# Patient Record
Sex: FEMALE | Race: WHITE | Employment: OTHER | URBAN - METROPOLITAN AREA
[De-identification: names, ages, dates, MRNs, and addresses within clinical notes are randomized per-mention and may not be internally consistent; named-entity substitution may affect disease eponyms.]

---

## 2017-01-18 ENCOUNTER — OFFICE VISIT (OUTPATIENT)
Dept: PRIMARY CARE CLINIC | Age: 82
End: 2017-01-18

## 2017-01-18 VITALS
HEART RATE: 67 BPM | DIASTOLIC BLOOD PRESSURE: 60 MMHG | BODY MASS INDEX: 19.81 KG/M2 | HEIGHT: 64 IN | SYSTOLIC BLOOD PRESSURE: 120 MMHG | WEIGHT: 116 LBS | OXYGEN SATURATION: 97 %

## 2017-01-18 DIAGNOSIS — E03.9 ACQUIRED HYPOTHYROIDISM: ICD-10-CM

## 2017-01-18 DIAGNOSIS — M54.50 ACUTE MIDLINE LOW BACK PAIN WITHOUT SCIATICA: Primary | ICD-10-CM

## 2017-01-18 DIAGNOSIS — D64.9 ANEMIA, UNSPECIFIED TYPE: ICD-10-CM

## 2017-01-18 LAB
BASOPHILS ABSOLUTE: 0.1 K/UL (ref 0–0.2)
BASOPHILS RELATIVE PERCENT: 2.3 %
BILIRUBIN URINE: NEGATIVE
BLOOD, URINE: NEGATIVE
CLARITY: CLEAR
COLOR: YELLOW
EOSINOPHILS ABSOLUTE: 0.1 K/UL (ref 0–0.6)
EOSINOPHILS RELATIVE PERCENT: 1.2 %
GLUCOSE URINE: NEGATIVE MG/DL
HCT VFR BLD CALC: 32 % (ref 36–48)
HEMOGLOBIN: 9.6 G/DL (ref 12–16)
KETONES, URINE: NEGATIVE MG/DL
LEUKOCYTE ESTERASE, URINE: NEGATIVE
LYMPHOCYTES ABSOLUTE: 1.1 K/UL (ref 1–5.1)
LYMPHOCYTES RELATIVE PERCENT: 24.3 %
MCH RBC QN AUTO: 21.7 PG (ref 26–34)
MCHC RBC AUTO-ENTMCNC: 30.2 G/DL (ref 31–36)
MCV RBC AUTO: 71.8 FL (ref 80–100)
MICROSCOPIC EXAMINATION: NORMAL
MONOCYTES ABSOLUTE: 0.5 K/UL (ref 0–1.3)
MONOCYTES RELATIVE PERCENT: 10.1 %
NEUTROPHILS ABSOLUTE: 2.9 K/UL (ref 1.7–7.7)
NEUTROPHILS RELATIVE PERCENT: 62.1 %
NITRITE, URINE: NEGATIVE
PDW BLD-RTO: 17.5 % (ref 12.4–15.4)
PH UA: 6.5
PLATELET # BLD: 223 K/UL (ref 135–450)
PMV BLD AUTO: 8.5 FL (ref 5–10.5)
PROTEIN UA: NEGATIVE MG/DL
RBC # BLD: 4.45 M/UL (ref 4–5.2)
SPECIFIC GRAVITY UA: 1
TSH SERPL DL<=0.05 MIU/L-ACNC: 0.49 UIU/ML (ref 0.27–4.2)
URINE TYPE: NORMAL
UROBILINOGEN, URINE: 0.2 E.U./DL
WBC # BLD: 4.6 K/UL (ref 4–11)

## 2017-01-18 PROCEDURE — 99214 OFFICE O/P EST MOD 30 MIN: CPT | Performed by: INTERNAL MEDICINE

## 2017-01-18 RX ORDER — ACETAMINOPHEN 500 MG
1000 TABLET ORAL EVERY 6 HOURS PRN
Qty: 120 TABLET | Refills: 3 | Status: SHIPPED | OUTPATIENT
Start: 2017-01-18 | End: 2019-01-01 | Stop reason: SDUPTHER

## 2017-01-18 RX ORDER — LIDOCAINE 50 MG/G
1 PATCH TOPICAL DAILY
Qty: 30 PATCH | Refills: 3 | Status: SHIPPED | OUTPATIENT
Start: 2017-01-18 | End: 2017-04-18

## 2017-01-18 ASSESSMENT — PATIENT HEALTH QUESTIONNAIRE - PHQ9
SUM OF ALL RESPONSES TO PHQ9 QUESTIONS 1 & 2: 0
2. FEELING DOWN, DEPRESSED OR HOPELESS: 0
1. LITTLE INTEREST OR PLEASURE IN DOING THINGS: 0
SUM OF ALL RESPONSES TO PHQ QUESTIONS 1-9: 0

## 2017-01-18 ASSESSMENT — ENCOUNTER SYMPTOMS
ABDOMINAL PAIN: 0
GASTROINTESTINAL NEGATIVE: 1
COUGH: 0
CHEST TIGHTNESS: 0
ABDOMINAL DISTENTION: 0
BACK PAIN: 1
EYES NEGATIVE: 1
SHORTNESS OF BREATH: 0
WHEEZING: 0

## 2017-01-27 ENCOUNTER — TELEPHONE (OUTPATIENT)
Dept: DERMATOLOGY | Age: 82
End: 2017-01-27

## 2017-02-21 ENCOUNTER — TELEPHONE (OUTPATIENT)
Dept: PRIMARY CARE CLINIC | Age: 82
End: 2017-02-21

## 2017-02-21 DIAGNOSIS — M81.0 OSTEOPOROSIS: ICD-10-CM

## 2017-02-21 RX ORDER — ALENDRONATE SODIUM 70 MG/1
70 TABLET ORAL
Qty: 4 TABLET | Refills: 12 | Status: SHIPPED | OUTPATIENT
Start: 2017-02-21 | End: 2017-04-18 | Stop reason: SDUPTHER

## 2017-02-21 RX ORDER — LEVOTHYROXINE SODIUM 112 UG/1
112 TABLET ORAL DAILY
Qty: 90 TABLET | Refills: 1 | Status: SHIPPED | OUTPATIENT
Start: 2017-02-21 | End: 2017-02-22 | Stop reason: SDUPTHER

## 2017-02-21 RX ORDER — PANTOPRAZOLE SODIUM 40 MG/1
40 TABLET, DELAYED RELEASE ORAL DAILY
Qty: 30 TABLET | Refills: 4 | Status: SHIPPED | OUTPATIENT
Start: 2017-02-21 | End: 2017-06-29 | Stop reason: SDUPTHER

## 2017-02-21 RX ORDER — WARFARIN SODIUM 2.5 MG/1
2.5 TABLET ORAL DAILY
Qty: 90 TABLET | Refills: 3 | Status: SHIPPED | OUTPATIENT
Start: 2017-02-21 | End: 2017-02-22 | Stop reason: SDUPTHER

## 2017-02-22 ENCOUNTER — HOSPITAL ENCOUNTER (OUTPATIENT)
Dept: MAMMOGRAPHY | Age: 82
Discharge: HOME | End: 2017-02-22
Attending: INTERNAL MEDICINE | Admitting: INTERNAL MEDICINE

## 2017-02-22 DIAGNOSIS — Z12.31 VISIT FOR SCREENING MAMMOGRAM: ICD-10-CM

## 2017-02-22 RX ORDER — LEVOTHYROXINE SODIUM 112 UG/1
112 TABLET ORAL DAILY
Qty: 90 TABLET | Refills: 1 | Status: SHIPPED | OUTPATIENT
Start: 2017-02-22 | End: 2017-06-29 | Stop reason: SDUPTHER

## 2017-02-22 RX ORDER — WARFARIN SODIUM 2.5 MG/1
2.5 TABLET ORAL DAILY
Qty: 90 TABLET | Refills: 3 | Status: SHIPPED | OUTPATIENT
Start: 2017-02-22 | End: 2017-10-24 | Stop reason: SDUPTHER

## 2017-03-08 ENCOUNTER — OFFICE VISIT (OUTPATIENT)
Dept: PRIMARY CARE CLINIC | Age: 82
End: 2017-03-08

## 2017-03-08 VITALS
DIASTOLIC BLOOD PRESSURE: 62 MMHG | BODY MASS INDEX: 19.71 KG/M2 | WEIGHT: 114.8 LBS | RESPIRATION RATE: 14 BRPM | HEART RATE: 60 BPM | OXYGEN SATURATION: 97 % | TEMPERATURE: 96.6 F | SYSTOLIC BLOOD PRESSURE: 108 MMHG

## 2017-03-08 DIAGNOSIS — R30.0 DYSURIA: Primary | ICD-10-CM

## 2017-03-08 DIAGNOSIS — G25.2 RESTING TREMOR: ICD-10-CM

## 2017-03-08 DIAGNOSIS — N30.01 ACUTE CYSTITIS WITH HEMATURIA: ICD-10-CM

## 2017-03-08 LAB
BILIRUBIN, POC: NORMAL
BLOOD URINE, POC: NORMAL
CLARITY, POC: NORMAL
COLOR, POC: YELLOW
GLUCOSE URINE, POC: NORMAL
KETONES, POC: 5
LEUKOCYTE EST, POC: NORMAL
NITRITE, POC: NORMAL
PH, POC: 5
PROTEIN, POC: NORMAL
SPECIFIC GRAVITY, POC: 1.01
UROBILINOGEN, POC: 0.2

## 2017-03-08 PROCEDURE — 81002 URINALYSIS NONAUTO W/O SCOPE: CPT | Performed by: INTERNAL MEDICINE

## 2017-03-08 PROCEDURE — 99213 OFFICE O/P EST LOW 20 MIN: CPT | Performed by: INTERNAL MEDICINE

## 2017-03-08 RX ORDER — SULFAMETHOXAZOLE AND TRIMETHOPRIM 800; 160 MG/1; MG/1
1 TABLET ORAL 2 TIMES DAILY
Qty: 14 TABLET | Refills: 0 | Status: SHIPPED | OUTPATIENT
Start: 2017-03-08 | End: 2017-03-15

## 2017-03-08 ASSESSMENT — ENCOUNTER SYMPTOMS
SORE THROAT: 0
DIARRHEA: 0
CONSTIPATION: 0
SINUS PRESSURE: 0
COUGH: 0
ABDOMINAL PAIN: 0
RHINORRHEA: 0
VOMITING: 0
NAUSEA: 0
SHORTNESS OF BREATH: 0
ABDOMINAL DISTENTION: 0

## 2017-03-09 DIAGNOSIS — G25.2 RESTING TREMOR: Primary | ICD-10-CM

## 2017-03-10 LAB
ORGANISM: ABNORMAL
URINE CULTURE, ROUTINE: ABNORMAL
URINE CULTURE, ROUTINE: ABNORMAL

## 2017-03-22 ENCOUNTER — TELEPHONE (OUTPATIENT)
Dept: PRIMARY CARE CLINIC | Age: 82
End: 2017-03-22

## 2017-03-23 ENCOUNTER — OFFICE VISIT (OUTPATIENT)
Dept: PRIMARY CARE CLINIC | Age: 82
End: 2017-03-23

## 2017-03-23 VITALS
TEMPERATURE: 97.2 F | SYSTOLIC BLOOD PRESSURE: 120 MMHG | DIASTOLIC BLOOD PRESSURE: 70 MMHG | BODY MASS INDEX: 19.57 KG/M2 | HEART RATE: 57 BPM | OXYGEN SATURATION: 99 % | WEIGHT: 114 LBS

## 2017-03-23 DIAGNOSIS — B37.31 VULVOVAGINAL CANDIDIASIS: Primary | ICD-10-CM

## 2017-03-23 PROCEDURE — 3288F FALL RISK ASSESSMENT DOCD: CPT | Performed by: INTERNAL MEDICINE

## 2017-03-23 PROCEDURE — 99213 OFFICE O/P EST LOW 20 MIN: CPT | Performed by: INTERNAL MEDICINE

## 2017-03-23 PROCEDURE — G8510 SCR DEP NEG, NO PLAN REQD: HCPCS | Performed by: INTERNAL MEDICINE

## 2017-03-23 RX ORDER — FLUCONAZOLE 100 MG/1
100 TABLET ORAL DAILY
Qty: 2 TABLET | Refills: 0 | Status: SHIPPED | OUTPATIENT
Start: 2017-03-23 | End: 2017-04-18

## 2017-03-23 ASSESSMENT — ENCOUNTER SYMPTOMS
WHEEZING: 0
EYES NEGATIVE: 1
GASTROINTESTINAL NEGATIVE: 1
COUGH: 0
CHEST TIGHTNESS: 0
ABDOMINAL PAIN: 0
ABDOMINAL DISTENTION: 0
SHORTNESS OF BREATH: 0

## 2017-03-23 ASSESSMENT — PATIENT HEALTH QUESTIONNAIRE - PHQ9
2. FEELING DOWN, DEPRESSED OR HOPELESS: 0
SUM OF ALL RESPONSES TO PHQ9 QUESTIONS 1 & 2: 0
SUM OF ALL RESPONSES TO PHQ QUESTIONS 1-9: 0
1. LITTLE INTEREST OR PLEASURE IN DOING THINGS: 0

## 2017-04-18 ENCOUNTER — OFFICE VISIT (OUTPATIENT)
Dept: PRIMARY CARE CLINIC | Age: 82
End: 2017-04-18

## 2017-04-18 VITALS
HEART RATE: 53 BPM | SYSTOLIC BLOOD PRESSURE: 130 MMHG | WEIGHT: 115 LBS | TEMPERATURE: 97.2 F | BODY MASS INDEX: 19.74 KG/M2 | OXYGEN SATURATION: 96 % | DIASTOLIC BLOOD PRESSURE: 60 MMHG

## 2017-04-18 DIAGNOSIS — E03.9 ACQUIRED HYPOTHYROIDISM: ICD-10-CM

## 2017-04-18 DIAGNOSIS — M81.0 OSTEOPOROSIS: ICD-10-CM

## 2017-04-18 DIAGNOSIS — D64.9 ANEMIA, UNSPECIFIED TYPE: ICD-10-CM

## 2017-04-18 DIAGNOSIS — I48.20 CHRONIC ATRIAL FIBRILLATION (HCC): ICD-10-CM

## 2017-04-18 DIAGNOSIS — Z23 NEED FOR VACCINATION FOR STREP PNEUMONIAE WITH PCV 7: ICD-10-CM

## 2017-04-18 DIAGNOSIS — R53.83 FATIGUE, UNSPECIFIED TYPE: Primary | ICD-10-CM

## 2017-04-18 DIAGNOSIS — Z23 NEED FOR TDAP VACCINATION: ICD-10-CM

## 2017-04-18 DIAGNOSIS — R53.83 FATIGUE, UNSPECIFIED TYPE: ICD-10-CM

## 2017-04-18 DIAGNOSIS — E55.9 VITAMIN D DEFICIENCY: ICD-10-CM

## 2017-04-18 LAB
A/G RATIO: 1.9 (ref 1.1–2.2)
ALBUMIN SERPL-MCNC: 4.3 G/DL (ref 3.4–5)
ALP BLD-CCNC: 51 U/L (ref 40–129)
ALT SERPL-CCNC: 13 U/L (ref 10–40)
ANION GAP SERPL CALCULATED.3IONS-SCNC: 13 MMOL/L (ref 3–16)
AST SERPL-CCNC: 20 U/L (ref 15–37)
BASOPHILS ABSOLUTE: 0.1 K/UL (ref 0–0.2)
BASOPHILS RELATIVE PERCENT: 1.1 %
BILIRUB SERPL-MCNC: 0.4 MG/DL (ref 0–1)
BILIRUBIN URINE: NEGATIVE
BLOOD, URINE: NEGATIVE
BUN BLDV-MCNC: 19 MG/DL (ref 7–20)
CALCIUM SERPL-MCNC: 9.1 MG/DL (ref 8.3–10.6)
CHLORIDE BLD-SCNC: 100 MMOL/L (ref 99–110)
CHOLESTEROL, TOTAL: 181 MG/DL (ref 0–199)
CLARITY: CLEAR
CO2: 26 MMOL/L (ref 21–32)
COLOR: YELLOW
CREAT SERPL-MCNC: 0.8 MG/DL (ref 0.6–1.2)
EOSINOPHILS ABSOLUTE: 0 K/UL (ref 0–0.6)
EOSINOPHILS RELATIVE PERCENT: 0.4 %
EPITHELIAL CELLS, UA: 1 /HPF (ref 0–5)
GFR AFRICAN AMERICAN: >60
GFR NON-AFRICAN AMERICAN: >60
GLOBULIN: 2.3 G/DL
GLUCOSE BLD-MCNC: 81 MG/DL (ref 70–99)
GLUCOSE URINE: NEGATIVE MG/DL
HCT VFR BLD CALC: 31.4 % (ref 36–48)
HDLC SERPL-MCNC: 81 MG/DL (ref 40–60)
HEMOGLOBIN: 9.5 G/DL (ref 12–16)
HYALINE CASTS: 0 /LPF (ref 0–8)
KETONES, URINE: NEGATIVE MG/DL
LDL CHOLESTEROL CALCULATED: 84 MG/DL
LEUKOCYTE ESTERASE, URINE: ABNORMAL
LYMPHOCYTES ABSOLUTE: 1.2 K/UL (ref 1–5.1)
LYMPHOCYTES RELATIVE PERCENT: 24.4 %
MCH RBC QN AUTO: 21.5 PG (ref 26–34)
MCHC RBC AUTO-ENTMCNC: 30.4 G/DL (ref 31–36)
MCV RBC AUTO: 70.8 FL (ref 80–100)
MICROSCOPIC EXAMINATION: YES
MONOCYTES ABSOLUTE: 0.5 K/UL (ref 0–1.3)
MONOCYTES RELATIVE PERCENT: 10.1 %
NEUTROPHILS ABSOLUTE: 3.1 K/UL (ref 1.7–7.7)
NEUTROPHILS RELATIVE PERCENT: 64 %
NITRITE, URINE: NEGATIVE
PDW BLD-RTO: 21.6 % (ref 12.4–15.4)
PH UA: 6
PLATELET # BLD: 186 K/UL (ref 135–450)
PMV BLD AUTO: 9 FL (ref 5–10.5)
POTASSIUM SERPL-SCNC: 4.5 MMOL/L (ref 3.5–5.1)
PROTEIN UA: NEGATIVE MG/DL
RBC # BLD: 4.44 M/UL (ref 4–5.2)
RBC UA: 3 /HPF (ref 0–4)
SODIUM BLD-SCNC: 139 MMOL/L (ref 136–145)
SPECIFIC GRAVITY UA: 1.01
TOTAL PROTEIN: 6.6 G/DL (ref 6.4–8.2)
TRIGL SERPL-MCNC: 80 MG/DL (ref 0–150)
TSH SERPL DL<=0.05 MIU/L-ACNC: 0.94 UIU/ML (ref 0.27–4.2)
URINE TYPE: ABNORMAL
UROBILINOGEN, URINE: 0.2 E.U./DL
VITAMIN D 25-HYDROXY: 54.8 NG/ML
VLDLC SERPL CALC-MCNC: 16 MG/DL
WBC # BLD: 4.8 K/UL (ref 4–11)
WBC UA: 0 /HPF (ref 0–5)

## 2017-04-18 RX ORDER — ALENDRONATE SODIUM 70 MG/1
70 TABLET ORAL
Qty: 4 TABLET | Refills: 12 | Status: SHIPPED | OUTPATIENT
Start: 2017-04-18 | End: 2017-08-16

## 2017-04-18 ASSESSMENT — ENCOUNTER SYMPTOMS
COUGH: 0
CHEST TIGHTNESS: 0
ABDOMINAL DISTENTION: 0
EYES NEGATIVE: 1
BACK PAIN: 1
ABDOMINAL PAIN: 0
WHEEZING: 0
SHORTNESS OF BREATH: 0
GASTROINTESTINAL NEGATIVE: 1

## 2017-04-21 ENCOUNTER — TELEPHONE (OUTPATIENT)
Dept: PRIMARY CARE CLINIC | Age: 82
End: 2017-04-21

## 2017-04-26 ENCOUNTER — TELEPHONE (OUTPATIENT)
Dept: PRIMARY CARE CLINIC | Age: 82
End: 2017-04-26

## 2017-04-28 ENCOUNTER — OFFICE VISIT (OUTPATIENT)
Dept: PRIMARY CARE CLINIC | Age: 82
End: 2017-04-28

## 2017-04-28 VITALS
SYSTOLIC BLOOD PRESSURE: 110 MMHG | HEIGHT: 63 IN | HEART RATE: 61 BPM | DIASTOLIC BLOOD PRESSURE: 62 MMHG | BODY MASS INDEX: 20.38 KG/M2 | TEMPERATURE: 97.2 F | WEIGHT: 115 LBS | OXYGEN SATURATION: 99 %

## 2017-04-28 DIAGNOSIS — Z00.00 MEDICARE ANNUAL WELLNESS VISIT, SUBSEQUENT: Primary | ICD-10-CM

## 2017-04-28 PROCEDURE — G0439 PPPS, SUBSEQ VISIT: HCPCS | Performed by: INTERNAL MEDICINE

## 2017-04-28 ASSESSMENT — ANXIETY QUESTIONNAIRES
GAD7 TOTAL SCORE: 4
GAD7 TOTAL SCORE: 6

## 2017-04-28 ASSESSMENT — ENCOUNTER SYMPTOMS
SHORTNESS OF BREATH: 0
BACK PAIN: 1
GASTROINTESTINAL NEGATIVE: 1
WHEEZING: 0
EYES NEGATIVE: 1
ABDOMINAL DISTENTION: 0
COUGH: 0
CHEST TIGHTNESS: 0
ABDOMINAL PAIN: 0

## 2017-04-28 ASSESSMENT — LIFESTYLE VARIABLES: HOW OFTEN DO YOU HAVE A DRINK CONTAINING ALCOHOL: 0

## 2017-04-28 ASSESSMENT — PATIENT HEALTH QUESTIONNAIRE - PHQ9: SUM OF ALL RESPONSES TO PHQ QUESTIONS 1-9: 2

## 2017-05-08 ENCOUNTER — OFFICE VISIT (OUTPATIENT)
Dept: DERMATOLOGY | Age: 82
End: 2017-05-08

## 2017-05-08 DIAGNOSIS — L72.0 MILIUM: Primary | ICD-10-CM

## 2017-05-08 DIAGNOSIS — L72.0 EPIDERMAL CYST: ICD-10-CM

## 2017-05-08 DIAGNOSIS — D48.5 NEOPLASM OF UNCERTAIN BEHAVIOR OF SKIN: ICD-10-CM

## 2017-05-08 DIAGNOSIS — Z85.828 HISTORY OF BASAL CELL CANCER: ICD-10-CM

## 2017-05-08 PROCEDURE — 17110 DESTRUCTION B9 LES UP TO 14: CPT | Performed by: DERMATOLOGY

## 2017-05-08 PROCEDURE — 11100 PR BIOPSY OF SKIN LESION: CPT | Performed by: DERMATOLOGY

## 2017-05-08 PROCEDURE — 99212 OFFICE O/P EST SF 10 MIN: CPT | Performed by: DERMATOLOGY

## 2017-05-16 ENCOUNTER — TELEPHONE (OUTPATIENT)
Dept: DERMATOLOGY | Age: 82
End: 2017-05-16

## 2017-06-29 ENCOUNTER — OFFICE VISIT (OUTPATIENT)
Dept: PRIMARY CARE CLINIC | Age: 82
End: 2017-06-29

## 2017-06-29 VITALS
TEMPERATURE: 97.7 F | HEIGHT: 63 IN | OXYGEN SATURATION: 99 % | DIASTOLIC BLOOD PRESSURE: 70 MMHG | BODY MASS INDEX: 20.2 KG/M2 | HEART RATE: 80 BPM | SYSTOLIC BLOOD PRESSURE: 120 MMHG | WEIGHT: 114 LBS | RESPIRATION RATE: 16 BRPM

## 2017-06-29 DIAGNOSIS — D50.0 IRON DEFICIENCY ANEMIA DUE TO CHRONIC BLOOD LOSS: ICD-10-CM

## 2017-06-29 DIAGNOSIS — R10.84 GENERALIZED ABDOMINAL PAIN: Primary | ICD-10-CM

## 2017-06-29 DIAGNOSIS — R10.84 GENERALIZED ABDOMINAL PAIN: ICD-10-CM

## 2017-06-29 LAB
AMYLASE: 99 U/L (ref 25–115)
BASOPHILS ABSOLUTE: 0 K/UL (ref 0–0.2)
BASOPHILS RELATIVE PERCENT: 0.7 %
BILIRUBIN URINE: NEGATIVE
BLOOD, URINE: NEGATIVE
CLARITY: CLEAR
COLOR: YELLOW
EOSINOPHILS ABSOLUTE: 0 K/UL (ref 0–0.6)
EOSINOPHILS RELATIVE PERCENT: 0.4 %
GLUCOSE URINE: NEGATIVE MG/DL
HCT VFR BLD CALC: 32 % (ref 36–48)
HEMOGLOBIN: 9.9 G/DL (ref 12–16)
KETONES, URINE: NEGATIVE MG/DL
LEUKOCYTE ESTERASE, URINE: NEGATIVE
LIPASE: 69 U/L (ref 13–60)
LYMPHOCYTES ABSOLUTE: 1 K/UL (ref 1–5.1)
LYMPHOCYTES RELATIVE PERCENT: 23.5 %
MCH RBC QN AUTO: 22.4 PG (ref 26–34)
MCHC RBC AUTO-ENTMCNC: 31 G/DL (ref 31–36)
MCV RBC AUTO: 72.1 FL (ref 80–100)
MICROSCOPIC EXAMINATION: NORMAL
MONOCYTES ABSOLUTE: 0.4 K/UL (ref 0–1.3)
MONOCYTES RELATIVE PERCENT: 10.3 %
NEUTROPHILS ABSOLUTE: 2.8 K/UL (ref 1.7–7.7)
NEUTROPHILS RELATIVE PERCENT: 65.1 %
NITRITE, URINE: NEGATIVE
PDW BLD-RTO: 20.4 % (ref 12.4–15.4)
PH UA: 6
PLATELET # BLD: 164 K/UL (ref 135–450)
PMV BLD AUTO: 8.9 FL (ref 5–10.5)
PROTEIN UA: NEGATIVE MG/DL
RBC # BLD: 4.45 M/UL (ref 4–5.2)
SPECIFIC GRAVITY UA: 1.01
URINE TYPE: NORMAL
UROBILINOGEN, URINE: 0.2 E.U./DL
WBC # BLD: 4.2 K/UL (ref 4–11)

## 2017-06-29 PROCEDURE — 99214 OFFICE O/P EST MOD 30 MIN: CPT | Performed by: INTERNAL MEDICINE

## 2017-06-29 RX ORDER — PANTOPRAZOLE SODIUM 40 MG/1
40 TABLET, DELAYED RELEASE ORAL DAILY
Qty: 90 TABLET | Refills: 1 | Status: SHIPPED | OUTPATIENT
Start: 2017-06-29 | End: 2017-10-24 | Stop reason: SDUPTHER

## 2017-06-29 RX ORDER — LEVOTHYROXINE SODIUM 112 UG/1
112 TABLET ORAL DAILY
Qty: 90 TABLET | Refills: 3 | Status: SHIPPED | OUTPATIENT
Start: 2017-06-29 | End: 2017-10-24 | Stop reason: SDUPTHER

## 2017-06-29 ASSESSMENT — ENCOUNTER SYMPTOMS
ABDOMINAL PAIN: 1
COUGH: 0
SHORTNESS OF BREATH: 0
ABDOMINAL DISTENTION: 0
BACK PAIN: 1
WHEEZING: 0
EYES NEGATIVE: 1
CHEST TIGHTNESS: 0

## 2017-08-16 ENCOUNTER — OFFICE VISIT (OUTPATIENT)
Dept: PRIMARY CARE CLINIC | Age: 82
End: 2017-08-16

## 2017-08-16 VITALS
HEIGHT: 64 IN | DIASTOLIC BLOOD PRESSURE: 66 MMHG | OXYGEN SATURATION: 97 % | TEMPERATURE: 97.5 F | BODY MASS INDEX: 19.46 KG/M2 | HEART RATE: 64 BPM | SYSTOLIC BLOOD PRESSURE: 111 MMHG | WEIGHT: 114 LBS

## 2017-08-16 DIAGNOSIS — M81.0 AGE-RELATED OSTEOPOROSIS WITHOUT CURRENT PATHOLOGICAL FRACTURE: ICD-10-CM

## 2017-08-16 DIAGNOSIS — Z13.31 POSITIVE DEPRESSION SCREENING: ICD-10-CM

## 2017-08-16 DIAGNOSIS — N30.00 ACUTE CYSTITIS WITHOUT HEMATURIA: ICD-10-CM

## 2017-08-16 DIAGNOSIS — N30.00 ACUTE CYSTITIS WITHOUT HEMATURIA: Primary | ICD-10-CM

## 2017-08-16 DIAGNOSIS — D64.9 ANEMIA, UNSPECIFIED TYPE: ICD-10-CM

## 2017-08-16 LAB
BILIRUBIN URINE: NEGATIVE
BLOOD, URINE: NEGATIVE
CLARITY: CLEAR
COLOR: YELLOW
GLUCOSE URINE: NEGATIVE MG/DL
KETONES, URINE: NEGATIVE MG/DL
LEUKOCYTE ESTERASE, URINE: NEGATIVE
MICROSCOPIC EXAMINATION: NORMAL
NITRITE, URINE: NEGATIVE
PH UA: 6
PROTEIN UA: NEGATIVE MG/DL
SPECIFIC GRAVITY UA: 1.01
URINE TYPE: NORMAL
UROBILINOGEN, URINE: 0.2 E.U./DL

## 2017-08-16 PROCEDURE — G8431 POS CLIN DEPRES SCRN F/U DOC: HCPCS | Performed by: INTERNAL MEDICINE

## 2017-08-16 PROCEDURE — 99213 OFFICE O/P EST LOW 20 MIN: CPT | Performed by: INTERNAL MEDICINE

## 2017-08-16 RX ORDER — ALENDRONATE SODIUM 70 MG/1
70 TABLET ORAL
Qty: 12 TABLET | Refills: 5 | Status: SHIPPED | OUTPATIENT
Start: 2017-08-16 | End: 2017-10-24

## 2017-08-16 RX ORDER — SULFAMETHOXAZOLE AND TRIMETHOPRIM 800; 160 MG/1; MG/1
1 TABLET ORAL 2 TIMES DAILY
Qty: 12 TABLET | Refills: 0 | Status: SHIPPED | OUTPATIENT
Start: 2017-08-16 | End: 2017-08-22

## 2017-08-16 ASSESSMENT — ENCOUNTER SYMPTOMS
SHORTNESS OF BREATH: 0
ABDOMINAL PAIN: 0
COUGH: 0
EYES NEGATIVE: 1
WHEEZING: 0
CHEST TIGHTNESS: 0
BACK PAIN: 1
ABDOMINAL DISTENTION: 0

## 2017-08-23 ENCOUNTER — TELEPHONE (OUTPATIENT)
Dept: PRIMARY CARE CLINIC | Age: 82
End: 2017-08-23

## 2017-09-27 ENCOUNTER — NURSE ONLY (OUTPATIENT)
Dept: PRIMARY CARE CLINIC | Age: 82
End: 2017-09-27

## 2017-09-27 DIAGNOSIS — Z23 NEED FOR INFLUENZA VACCINATION: Primary | ICD-10-CM

## 2017-09-27 PROCEDURE — 90662 IIV NO PRSV INCREASED AG IM: CPT | Performed by: NURSE PRACTITIONER

## 2017-09-27 PROCEDURE — G0008 ADMIN INFLUENZA VIRUS VAC: HCPCS | Performed by: NURSE PRACTITIONER

## 2017-10-24 ENCOUNTER — OFFICE VISIT (OUTPATIENT)
Dept: PRIMARY CARE CLINIC | Age: 82
End: 2017-10-24

## 2017-10-24 VITALS
HEART RATE: 66 BPM | OXYGEN SATURATION: 96 % | HEIGHT: 64 IN | BODY MASS INDEX: 19.46 KG/M2 | WEIGHT: 114 LBS | TEMPERATURE: 97.3 F | DIASTOLIC BLOOD PRESSURE: 60 MMHG | SYSTOLIC BLOOD PRESSURE: 120 MMHG

## 2017-10-24 DIAGNOSIS — I48.20 CHRONIC ATRIAL FIBRILLATION (HCC): ICD-10-CM

## 2017-10-24 DIAGNOSIS — Z79.01 LONG TERM CURRENT USE OF ANTICOAGULANT THERAPY: ICD-10-CM

## 2017-10-24 DIAGNOSIS — D50.0 IRON DEFICIENCY ANEMIA DUE TO CHRONIC BLOOD LOSS: ICD-10-CM

## 2017-10-24 DIAGNOSIS — R53.83 FATIGUE, UNSPECIFIED TYPE: Primary | ICD-10-CM

## 2017-10-24 DIAGNOSIS — R53.83 FATIGUE, UNSPECIFIED TYPE: ICD-10-CM

## 2017-10-24 DIAGNOSIS — E03.9 ACQUIRED HYPOTHYROIDISM: ICD-10-CM

## 2017-10-24 LAB
BASOPHILS ABSOLUTE: 0.1 K/UL (ref 0–0.2)
BASOPHILS RELATIVE PERCENT: 1.3 %
BILIRUBIN URINE: NEGATIVE
BLOOD, URINE: NEGATIVE
CLARITY: CLEAR
COLOR: YELLOW
EOSINOPHILS ABSOLUTE: 0 K/UL (ref 0–0.6)
EOSINOPHILS RELATIVE PERCENT: 0.4 %
GLUCOSE URINE: NEGATIVE MG/DL
HCT VFR BLD CALC: 31.8 % (ref 36–48)
HEMOGLOBIN: 9.9 G/DL (ref 12–16)
INR BLD: 2.89 (ref 0.85–1.15)
KETONES, URINE: NEGATIVE MG/DL
LEUKOCYTE ESTERASE, URINE: NEGATIVE
LYMPHOCYTES ABSOLUTE: 1.3 K/UL (ref 1–5.1)
LYMPHOCYTES RELATIVE PERCENT: 23.7 %
MCH RBC QN AUTO: 22.3 PG (ref 26–34)
MCHC RBC AUTO-ENTMCNC: 31 G/DL (ref 31–36)
MCV RBC AUTO: 72 FL (ref 80–100)
MICROSCOPIC EXAMINATION: NORMAL
MONOCYTES ABSOLUTE: 0.6 K/UL (ref 0–1.3)
MONOCYTES RELATIVE PERCENT: 10.6 %
NEUTROPHILS ABSOLUTE: 3.5 K/UL (ref 1.7–7.7)
NEUTROPHILS RELATIVE PERCENT: 64 %
NITRITE, URINE: NEGATIVE
PDW BLD-RTO: 19.2 % (ref 12.4–15.4)
PH UA: 6
PLATELET # BLD: 185 K/UL (ref 135–450)
PMV BLD AUTO: 8.6 FL (ref 5–10.5)
PROTEIN UA: NEGATIVE MG/DL
PROTHROMBIN TIME: 32.7 SEC (ref 9.6–13)
RBC # BLD: 4.41 M/UL (ref 4–5.2)
SPECIFIC GRAVITY UA: <1.005
TSH SERPL DL<=0.05 MIU/L-ACNC: 0.41 UIU/ML (ref 0.27–4.2)
URINE TYPE: NORMAL
UROBILINOGEN, URINE: 0.2 E.U./DL
WBC # BLD: 5.4 K/UL (ref 4–11)

## 2017-10-24 PROCEDURE — G8484 FLU IMMUNIZE NO ADMIN: HCPCS | Performed by: INTERNAL MEDICINE

## 2017-10-24 PROCEDURE — 99214 OFFICE O/P EST MOD 30 MIN: CPT | Performed by: INTERNAL MEDICINE

## 2017-10-24 PROCEDURE — 1123F ACP DISCUSS/DSCN MKR DOCD: CPT | Performed by: INTERNAL MEDICINE

## 2017-10-24 PROCEDURE — G8399 PT W/DXA RESULTS DOCUMENT: HCPCS | Performed by: INTERNAL MEDICINE

## 2017-10-24 PROCEDURE — 4040F PNEUMOC VAC/ADMIN/RCVD: CPT | Performed by: INTERNAL MEDICINE

## 2017-10-24 PROCEDURE — G8420 CALC BMI NORM PARAMETERS: HCPCS | Performed by: INTERNAL MEDICINE

## 2017-10-24 PROCEDURE — 1090F PRES/ABSN URINE INCON ASSESS: CPT | Performed by: INTERNAL MEDICINE

## 2017-10-24 PROCEDURE — G8427 DOCREV CUR MEDS BY ELIG CLIN: HCPCS | Performed by: INTERNAL MEDICINE

## 2017-10-24 PROCEDURE — 1036F TOBACCO NON-USER: CPT | Performed by: INTERNAL MEDICINE

## 2017-10-24 RX ORDER — LEVOTHYROXINE SODIUM 112 UG/1
112 TABLET ORAL DAILY
Qty: 90 TABLET | Refills: 3 | Status: SHIPPED | OUTPATIENT
Start: 2017-10-24 | End: 2018-06-13 | Stop reason: SDUPTHER

## 2017-10-24 RX ORDER — WARFARIN SODIUM 2.5 MG/1
2.5 TABLET ORAL DAILY
Qty: 90 TABLET | Refills: 3 | Status: SHIPPED | OUTPATIENT
Start: 2017-10-24 | End: 2018-02-20 | Stop reason: SDUPTHER

## 2017-10-24 RX ORDER — PANTOPRAZOLE SODIUM 40 MG/1
40 TABLET, DELAYED RELEASE ORAL DAILY
Qty: 90 TABLET | Refills: 3 | Status: SHIPPED | OUTPATIENT
Start: 2017-10-24 | End: 2018-02-20 | Stop reason: SDUPTHER

## 2017-10-24 ASSESSMENT — ENCOUNTER SYMPTOMS
BACK PAIN: 0
CHEST TIGHTNESS: 0
ABDOMINAL PAIN: 0
WHEEZING: 0
COUGH: 0
ABDOMINAL DISTENTION: 0
SHORTNESS OF BREATH: 0
EYES NEGATIVE: 1

## 2017-10-24 NOTE — PROGRESS NOTES
Neurological: She is alert and oriented to person, place, and time. Skin: Skin is warm. Psychiatric: Her behavior is normal. Her mood appears anxious. Vitals reviewed. Assessment:      Armaan was seen today for check-up and fatigue. Diagnoses and all orders for this visit:    Fatigue, unspecified type  -     CBC Auto Differential; Future  -     TSH without Reflex; Future  -     Urinalysis; Future    Iron deficiency anemia due to chronic blood loss  Taking daily iron   -     CBC Auto Differential; Future  -     pantoprazole (PROTONIX) 40 MG tablet; Take 1 tablet by mouth daily    Acquired hypothyroidism  -     TSH without Reflex; Future  -     levothyroxine (SYNTHROID) 112 MCG tablet; Take 1 tablet by mouth daily    Chronic atrial fibrillation (HCC) rate controlled . antcoag  -     warfarin (COUMADIN) 2.5 MG tablet; Take 1 tablet by mouth daily    Other orders  -     CALCIUM CITRATE, BARIATRIC ADVANTAGE, 500MG LOZENGE; Take 2 lozenges by mouth daily      Age-related osteoporosis without current pathological fracture  Fosamax + calccium                 Plan:      Self Management Goals    Know which medication is for what condition:   Know side effects of medications, and discuss with doctor   Discuss side effects and instructions on new medications. Barriers to medication compliance addressed. All patient questions answered. Pt voiced understanding. Know correct dose/frequency of medications  Take medications at the same time each day  Stay current on medication refills  If taking OTC's check with MD/pharmacy first about interactions    Systolic BP < or equal to 353  Diastolic BP < or equal to 85    Current Flu and Pneumonia Vax    Exercise 3-5 times per week as tolerated   Keep check of weight  Weighting machine    On a scale of 1 to 5 how confident are you in these goals? 4/5       Education materials given        .

## 2017-10-25 NOTE — TELEPHONE ENCOUNTER
Pharmacy calling for clarification on the patients medication calcium citrate bariatric carlos.  Pharmacy number [phone] please advise

## 2017-11-17 ENCOUNTER — OFFICE VISIT (OUTPATIENT)
Dept: PRIMARY CARE CLINIC | Age: 82
End: 2017-11-17

## 2017-11-17 VITALS
TEMPERATURE: 97.6 F | DIASTOLIC BLOOD PRESSURE: 70 MMHG | HEIGHT: 64 IN | OXYGEN SATURATION: 98 % | HEART RATE: 83 BPM | SYSTOLIC BLOOD PRESSURE: 130 MMHG | WEIGHT: 114 LBS | BODY MASS INDEX: 19.46 KG/M2

## 2017-11-17 DIAGNOSIS — J06.9 URI, ACUTE: Primary | ICD-10-CM

## 2017-11-17 PROCEDURE — 4040F PNEUMOC VAC/ADMIN/RCVD: CPT | Performed by: INTERNAL MEDICINE

## 2017-11-17 PROCEDURE — G8484 FLU IMMUNIZE NO ADMIN: HCPCS | Performed by: INTERNAL MEDICINE

## 2017-11-17 PROCEDURE — 1036F TOBACCO NON-USER: CPT | Performed by: INTERNAL MEDICINE

## 2017-11-17 PROCEDURE — G8399 PT W/DXA RESULTS DOCUMENT: HCPCS | Performed by: INTERNAL MEDICINE

## 2017-11-17 PROCEDURE — G8420 CALC BMI NORM PARAMETERS: HCPCS | Performed by: INTERNAL MEDICINE

## 2017-11-17 PROCEDURE — G8427 DOCREV CUR MEDS BY ELIG CLIN: HCPCS | Performed by: INTERNAL MEDICINE

## 2017-11-17 PROCEDURE — 1123F ACP DISCUSS/DSCN MKR DOCD: CPT | Performed by: INTERNAL MEDICINE

## 2017-11-17 PROCEDURE — 1090F PRES/ABSN URINE INCON ASSESS: CPT | Performed by: INTERNAL MEDICINE

## 2017-11-17 PROCEDURE — 99213 OFFICE O/P EST LOW 20 MIN: CPT | Performed by: INTERNAL MEDICINE

## 2017-11-17 RX ORDER — AMOXICILLIN 500 MG/1
500 CAPSULE ORAL 3 TIMES DAILY
Qty: 21 CAPSULE | Refills: 0 | Status: SHIPPED | OUTPATIENT
Start: 2017-11-17 | End: 2017-11-24

## 2017-11-17 ASSESSMENT — ENCOUNTER SYMPTOMS
CHEST TIGHTNESS: 0
EYES NEGATIVE: 1
WHEEZING: 0
COUGH: 1
ABDOMINAL PAIN: 0
BACK PAIN: 0
ABDOMINAL DISTENTION: 0
SHORTNESS OF BREATH: 0

## 2017-11-17 NOTE — PROGRESS NOTES
Subjective:      Patient ID: Bianca Valdes is a 80 y.o. female. 17 Patient presents with:  Cough: c/o cough for 2 days   OTC Robitussin not helping . Gets chills too             Last seen  10/24/17           10 yrs . One daughter  . Lots of relatives . Still lonely . Loves by herself                 8/18/15  Established New Doctor . Cough   Associated symptoms include chills. Pertinent negatives include no shortness of breath or wheezing. Review of Systems   Constitutional: Positive for chills and fatigue. Negative for activity change. Pneumonia vac  ; prevnar   Td ap    Flu vac  10/16  Zostavac    HENT:        Partials . Eyes: Negative. Glasses ; Last eye ex 4/15 . S/P Bilat cataract surgery   Respiratory: Positive for cough. Negative for chest tightness, shortness of breath and wheezing. Does not smoke ; No Etoh    Cardiovascular: Negative. No HTN / CAD . No FH either     H/O Atrial Fib > on Coumadin    Gastrointestinal: Negative for abdominal distention and abdominal pain. Colonoscopy >    Endocrine:        No diabetes    Genitourinary: Negative for dysuria, frequency, urgency, vaginal discharge (itching) and vaginal pain. Hysterectromy   One daughter  in accident at 25     Mammogram 9/15    Musculoskeletal: Negative for arthralgias and back pain. DEXA  9/15  ; osteoporosis    Allergic/Immunologic: Negative for food allergies. Neurological: Positive for dizziness (off and on ) and tremors. Negative for numbness. Psychiatric/Behavioral: Negative for behavioral problems and sleep disturbance. The patient is nervous/anxious. Objective:   Physical Exam   Constitutional: She is oriented to person, place, and time. HENT:   Mouth/Throat: Oropharyngeal exudate present. Eyes: Conjunctivae are normal.   Neck: Neck supple. Cardiovascular: Normal rate and normal heart sounds.   An irregular rhythm present. Pulmonary/Chest: She has no wheezes. She has no rales. Abdominal: She exhibits no distension. There is no tenderness. Musculoskeletal: Normal range of motion. She exhibits deformity. She exhibits no edema. Neurological: She is alert and oriented to person, place, and time. Skin: Skin is warm. Psychiatric: Her behavior is normal. Her mood appears anxious. Vitals reviewed. Assessment:      Armaan was seen today for cough. Diagnoses and all orders for this visit:    URI, acute  -     amoxicillin (AMOXIL) 500 MG capsule; Take 1 capsule by mouth 3 times daily for 7 days        Iron deficiency anemia due to chronic blood loss  Taking daily iron   -   Acquired hypothyroidism  Cont Synthroid 112 mcg / d   -     TSH without Reflex; Future  -     levothyroxine (SYNTHROID) 112 MCG tablet; Take 1 tablet by mouth daily    Chronic atrial fibrillation (HCC) rate controlled . antcoag  -     warfarin (COUMADIN) 2.5 MG tablet; Take 1 tablet by mouth daily    Other orders  -     CALCIUM CITRATE, BARIATRIC ADVANTAGE, 500MG LOZENGE; Take 2 lozenges by mouth daily      Age-related osteoporosis without current pathological fracture  Fosamax + calccium                 Plan:      Self Management Goals    Know which medication is for what condition:   Know side effects of medications, and discuss with doctor   Discuss side effects and instructions on new medications. Barriers to medication compliance addressed. All patient questions answered. Pt voiced understanding.   Know correct dose/frequency of medications  Take medications at the same time each day  Stay current on medication refills  If taking OTC's check with MD/pharmacy first about interactions    Systolic BP < or equal to 484  Diastolic BP < or equal to 85    Current Flu and Pneumonia Vax    Exercise 3-5 times per week as tolerated   Keep check of weight  Weighting machine    On a scale of 1 to 5 how confident are you in these goals?  4/5       Education materials given        .

## 2018-01-17 ENCOUNTER — TELEPHONE (OUTPATIENT)
Dept: FAMILY MEDICINE CLINIC | Age: 83
End: 2018-01-17

## 2018-01-22 ENCOUNTER — OFFICE VISIT (OUTPATIENT)
Dept: PRIMARY CARE CLINIC | Age: 83
End: 2018-01-22

## 2018-01-22 VITALS
OXYGEN SATURATION: 97 % | HEART RATE: 63 BPM | DIASTOLIC BLOOD PRESSURE: 60 MMHG | WEIGHT: 113 LBS | SYSTOLIC BLOOD PRESSURE: 100 MMHG | TEMPERATURE: 97.3 F | BODY MASS INDEX: 19.29 KG/M2 | HEIGHT: 64 IN

## 2018-01-22 DIAGNOSIS — R35.0 FREQUENCY OF URINATION: ICD-10-CM

## 2018-01-22 DIAGNOSIS — J06.9 URI, ACUTE: Primary | ICD-10-CM

## 2018-01-22 LAB
BILIRUBIN URINE: NEGATIVE
BLOOD, URINE: NEGATIVE
CLARITY: CLEAR
COLOR: YELLOW
GLUCOSE URINE: NEGATIVE MG/DL
INFLUENZA A ANTIBODY: NEGATIVE
INFLUENZA B ANTIBODY: NEGATIVE
KETONES, URINE: NEGATIVE MG/DL
LEUKOCYTE ESTERASE, URINE: NEGATIVE
MICROSCOPIC EXAMINATION: NORMAL
NITRITE, URINE: NEGATIVE
PH UA: 5.5
PROTEIN UA: NEGATIVE MG/DL
SPECIFIC GRAVITY UA: 1.02
URINE TYPE: NORMAL
UROBILINOGEN, URINE: 0.2 E.U./DL

## 2018-01-22 PROCEDURE — G8399 PT W/DXA RESULTS DOCUMENT: HCPCS | Performed by: INTERNAL MEDICINE

## 2018-01-22 PROCEDURE — G8484 FLU IMMUNIZE NO ADMIN: HCPCS | Performed by: INTERNAL MEDICINE

## 2018-01-22 PROCEDURE — 1036F TOBACCO NON-USER: CPT | Performed by: INTERNAL MEDICINE

## 2018-01-22 PROCEDURE — 1090F PRES/ABSN URINE INCON ASSESS: CPT | Performed by: INTERNAL MEDICINE

## 2018-01-22 PROCEDURE — G8427 DOCREV CUR MEDS BY ELIG CLIN: HCPCS | Performed by: INTERNAL MEDICINE

## 2018-01-22 PROCEDURE — 87804 INFLUENZA ASSAY W/OPTIC: CPT | Performed by: INTERNAL MEDICINE

## 2018-01-22 PROCEDURE — 1123F ACP DISCUSS/DSCN MKR DOCD: CPT | Performed by: INTERNAL MEDICINE

## 2018-01-22 PROCEDURE — 4040F PNEUMOC VAC/ADMIN/RCVD: CPT | Performed by: INTERNAL MEDICINE

## 2018-01-22 PROCEDURE — G8420 CALC BMI NORM PARAMETERS: HCPCS | Performed by: INTERNAL MEDICINE

## 2018-01-22 PROCEDURE — 99213 OFFICE O/P EST LOW 20 MIN: CPT | Performed by: INTERNAL MEDICINE

## 2018-01-22 ASSESSMENT — ENCOUNTER SYMPTOMS
COUGH: 1
ABDOMINAL PAIN: 0
EYES NEGATIVE: 1
CHEST TIGHTNESS: 0
WHEEZING: 0
ABDOMINAL DISTENTION: 0
BACK PAIN: 0
SHORTNESS OF BREATH: 0

## 2018-01-22 NOTE — PROGRESS NOTES
Subjective:      Patient ID: Paola Dumont is a 80 y.o. female. 18 Patient presents with:  Cough: c/o dry cough and sinus headache for 2 weeks. Headache  Urinary Frequency            Last seen  17 Patient presents with:  Cough: c/o cough for 2 days   OTC Robitussin not helping . Gets chills too                    10 yrs . One daughter  . Lots of relatives . Still lonely . Loves by herself                 8/18/15  Established New Doctor . Cough   Associated symptoms include headaches. Pertinent negatives include no chills, shortness of breath or wheezing. Headache    Associated symptoms include coughing (off and on ). Pertinent negatives include no abdominal pain, back pain, dizziness (off and on ) or numbness. Review of Systems   Constitutional: Positive for fatigue. Negative for activity change and chills. Pneumonia vac  ; prevnar   Td ap    Flu vac    Zostavac    HENT:        Partials . Eyes: Negative. Glasses ; Last eye ex 4/15 . S/P Bilat cataract surgery   Respiratory: Positive for cough (off and on ). Negative for chest tightness, shortness of breath and wheezing. Does not smoke ; No Etoh    Cardiovascular: Negative. No HTN / CAD . No FH either     H/O Atrial Fib > on Coumadin    Gastrointestinal: Negative for abdominal distention and abdominal pain. Colonoscopy >    Endocrine:        No diabetes    Genitourinary: Positive for frequency (off and on n). Negative for dysuria, urgency, vaginal discharge (itching) and vaginal pain. Hysterectromy   One daughter  in accident at 25     Mammogram 9/15    Musculoskeletal: Negative for arthralgias and back pain. DEXA  9/15  ; osteoporosis    Allergic/Immunologic: Negative for food allergies. Neurological: Positive for tremors and headaches. Negative for dizziness (off and on ), speech difficulty and numbness.    Psychiatric/Behavioral:

## 2018-02-20 DIAGNOSIS — I48.20 CHRONIC ATRIAL FIBRILLATION (HCC): ICD-10-CM

## 2018-02-20 DIAGNOSIS — D50.0 IRON DEFICIENCY ANEMIA DUE TO CHRONIC BLOOD LOSS: ICD-10-CM

## 2018-02-20 RX ORDER — PANTOPRAZOLE SODIUM 40 MG/1
40 TABLET, DELAYED RELEASE ORAL DAILY
Qty: 90 TABLET | Refills: 3 | Status: SHIPPED | OUTPATIENT
Start: 2018-02-20 | End: 2018-04-23

## 2018-02-20 RX ORDER — WARFARIN SODIUM 2.5 MG/1
2.5 TABLET ORAL DAILY
Qty: 90 TABLET | Refills: 3 | Status: SHIPPED | OUTPATIENT
Start: 2018-02-20 | End: 2019-01-17 | Stop reason: SDUPTHER

## 2018-02-20 NOTE — TELEPHONE ENCOUNTER
Patient requesting refills on the following medications pantoprazole 40 mg and also warfarin 2.5 mg asking that meds be sent to [facility] mail order please advise when completed

## 2018-03-28 ENCOUNTER — TELEPHONE (OUTPATIENT)
Dept: PRIMARY CARE CLINIC | Age: 83
End: 2018-03-28

## 2018-03-29 DIAGNOSIS — M25.552 PAIN OF BOTH HIP JOINTS: Primary | ICD-10-CM

## 2018-03-29 DIAGNOSIS — M25.551 PAIN OF BOTH HIP JOINTS: Primary | ICD-10-CM

## 2018-03-30 ENCOUNTER — OFFICE VISIT (OUTPATIENT)
Dept: ORTHOPEDIC SURGERY | Age: 83
End: 2018-03-30

## 2018-03-30 VITALS — BODY MASS INDEX: 18.83 KG/M2 | RESPIRATION RATE: 12 BRPM | HEIGHT: 65 IN | WEIGHT: 113 LBS

## 2018-03-30 DIAGNOSIS — M25.551 RIGHT HIP PAIN: ICD-10-CM

## 2018-03-30 DIAGNOSIS — M54.5 LOW BACK PAIN, UNSPECIFIED BACK PAIN LATERALITY, UNSPECIFIED CHRONICITY, WITH SCIATICA PRESENCE UNSPECIFIED: Primary | ICD-10-CM

## 2018-03-30 PROCEDURE — 4040F PNEUMOC VAC/ADMIN/RCVD: CPT | Performed by: PHYSICIAN ASSISTANT

## 2018-03-30 PROCEDURE — G8427 DOCREV CUR MEDS BY ELIG CLIN: HCPCS | Performed by: PHYSICIAN ASSISTANT

## 2018-03-30 PROCEDURE — G8420 CALC BMI NORM PARAMETERS: HCPCS | Performed by: PHYSICIAN ASSISTANT

## 2018-03-30 PROCEDURE — G8399 PT W/DXA RESULTS DOCUMENT: HCPCS | Performed by: PHYSICIAN ASSISTANT

## 2018-03-30 PROCEDURE — 99203 OFFICE O/P NEW LOW 30 MIN: CPT | Performed by: PHYSICIAN ASSISTANT

## 2018-03-30 PROCEDURE — 1090F PRES/ABSN URINE INCON ASSESS: CPT | Performed by: PHYSICIAN ASSISTANT

## 2018-03-30 PROCEDURE — 1036F TOBACCO NON-USER: CPT | Performed by: PHYSICIAN ASSISTANT

## 2018-03-30 PROCEDURE — 1123F ACP DISCUSS/DSCN MKR DOCD: CPT | Performed by: PHYSICIAN ASSISTANT

## 2018-03-30 PROCEDURE — G8482 FLU IMMUNIZE ORDER/ADMIN: HCPCS | Performed by: PHYSICIAN ASSISTANT

## 2018-03-30 RX ORDER — METHYLPREDNISOLONE 4 MG/1
TABLET ORAL
Qty: 1 KIT | Refills: 0 | Status: SHIPPED | OUTPATIENT
Start: 2018-03-30 | End: 2018-04-05

## 2018-03-30 RX ORDER — TRAMADOL HYDROCHLORIDE 50 MG/1
50 TABLET ORAL DAILY
COMMUNITY
Start: 2018-03-04 | End: 2018-04-23

## 2018-03-30 RX ORDER — METHYLPREDNISOLONE 4 MG/1
TABLET ORAL
Qty: 1 KIT | Refills: 0 | Status: SHIPPED | OUTPATIENT
Start: 2018-03-30 | End: 2018-03-30

## 2018-03-30 NOTE — PATIENT INSTRUCTIONS
appointments, and call your doctor if you are having problems. It's also a good idea to know your test results and keep a list of the medicines you take. Where can you learn more? Go to [email or web address] and sign in to your [employer] account. Enter A062 in the [employer] box to learn more about \"Low Back Arthritis: Exercises. \"     If you do not have an account, please click on the \"Sign Up Now\" link. Current as of: March 21, 2017  Content Version: 11.5  © 7782-2548 Healthwise, Incorporated. Care instructions adapted under license by [facility] ([facility]). If you have questions about a medical condition or this instruction, always ask your healthcare professional. [address] any warranty or liability for your use of this information.

## 2018-03-30 NOTE — LETTER
[address] [place] [address] After Hours  [address] [address]  Phone: [phone]  Fax: [phone]    Maris Supriya, [place]        April 2, 2018     Pablo Martel, [facility]    Patient: Liyah Sierra  MR Number: [id number]  YOB: 1932  Date of Visit: 3/30/2018    Dear Dr. Pablo Martel:    Thank you for the request for consultation for Armaan Barker to me for the evaluation of   Encounter Diagnoses   Name Primary?  Low back pain, unspecified back pain laterality, unspecified chronicity, with sciatica presence unspecified Yes    Right hip pain      . Below are the relevant portions of my assessment and plan of care. This dictation was done with Dragon dictation and may contain mechanical errors related to translation. The review of systems was currently provided by the patient and reviewed with the medical assistant at today's visit. Please see media. Subjective:  Liyah Sierra is a 80 y.o. who is here complaining of pain in her right lower back radiating down to her right toe she doesn't remember a particular injury it's been going on for at least 2-3 weeks to the severity.  She hasn't had any particular treatment in the past the pain is becoming very bothersome to her daily activities she is here for evaluation I sent her for x-rays of her lower back      Patient Active Problem List   Diagnosis    Anxiety state    Depression    Goiter    Hypercholesterolemia    Atrial fibrillation    Essential hypertension    Osteoporosis    Allergic rhinitis, seasonal    Long term current use of anticoagulant therapy    Chest pain    Abdominal pain    Bradycardia    Fatigue    Basal cell carcinoma of mandible    Acute cystitis with hematuria    Resting tremor           Current Outpatient Prescriptions on File Prior to Visit   Medication Sig Dispense Refill

## 2018-04-02 NOTE — COMMUNICATION BODY
09/23/1983, not currently breastfeeding. On examination this is a pleasant 80-year-old female in no acute distress she is alert and oriented ×3 she can walk without antalgia she has good symmetric motion through each hip but she has a positive straight leg raise on the right that radiates down to the great toe she has a negative straight leg raise on the left she is good hip flexion and hip abduction strength. There is no cutaneous lesions or lymphadenopathy she's nontender of the greater trochanteric area and she is neurologically intact to her right foot with good dorsiflexion and plantarflexion strength  Neuro exam grossly intact both lower extremities. Intact sensation to light touch. Motor exam 4+ to 5/5 in all major motor groups. Negative Anguiano's sign. Skin is warm, dry and intact with out erythema or significant increased temperature around the knee joint(s). There are no cutaneous lesions or lymphadenopathy present. X-RAYS:  X-rays taken at the office today including AP lateral lumbar sacral view show very minimal degenerative changes through the lumbosacral spine      Assessment:  L5 radiculopathy to the right    Plan:  During today's visit, there was approximately 30 minutes of face-to-face discussion in regards to the patient's current condition and treatment options. More than 50 % of the time was counseling and coordination of care.       We talked about options and treatment she wanted a cortisone injection however at this point I feel a Medrol Dosepak and the specific stretch and strengthening exercise program is in her best interest      PROCEDURE NOTE:   I went through a specific stretch and strengthening exercise routine and she'll follow up with us in 4-5 weeks after finishing the Medrol Dosepak      They will schedule a follow up in a month

## 2018-04-23 ENCOUNTER — OFFICE VISIT (OUTPATIENT)
Dept: PRIMARY CARE CLINIC | Age: 83
End: 2018-04-23

## 2018-04-23 VITALS
OXYGEN SATURATION: 100 % | RESPIRATION RATE: 12 BRPM | TEMPERATURE: 97.1 F | DIASTOLIC BLOOD PRESSURE: 56 MMHG | SYSTOLIC BLOOD PRESSURE: 132 MMHG | HEART RATE: 57 BPM | WEIGHT: 111 LBS | HEIGHT: 65 IN | BODY MASS INDEX: 18.49 KG/M2

## 2018-04-23 DIAGNOSIS — N30.00 ACUTE CYSTITIS WITHOUT HEMATURIA: ICD-10-CM

## 2018-04-23 DIAGNOSIS — N30.00 ACUTE CYSTITIS WITHOUT HEMATURIA: Primary | ICD-10-CM

## 2018-04-23 DIAGNOSIS — R25.1 TREMOR OF RIGHT HAND: ICD-10-CM

## 2018-04-23 LAB
BILIRUBIN, POC: NORMAL
BLOOD URINE, POC: NORMAL
CLARITY, POC: CLEAR
COLOR, POC: YELLOW
GLUCOSE URINE, POC: NORMAL
KETONES, POC: NORMAL
LEUKOCYTE EST, POC: NORMAL
NITRITE, POC: NORMAL
PH, POC: 5.5
PROTEIN, POC: NORMAL
SPECIFIC GRAVITY, POC: 1.01
UROBILINOGEN, POC: 0.2

## 2018-04-23 PROCEDURE — 1090F PRES/ABSN URINE INCON ASSESS: CPT | Performed by: FAMILY MEDICINE

## 2018-04-23 PROCEDURE — 81002 URINALYSIS NONAUTO W/O SCOPE: CPT | Performed by: FAMILY MEDICINE

## 2018-04-23 PROCEDURE — G8399 PT W/DXA RESULTS DOCUMENT: HCPCS | Performed by: FAMILY MEDICINE

## 2018-04-23 PROCEDURE — 99213 OFFICE O/P EST LOW 20 MIN: CPT | Performed by: FAMILY MEDICINE

## 2018-04-23 PROCEDURE — 1123F ACP DISCUSS/DSCN MKR DOCD: CPT | Performed by: FAMILY MEDICINE

## 2018-04-23 PROCEDURE — G8427 DOCREV CUR MEDS BY ELIG CLIN: HCPCS | Performed by: FAMILY MEDICINE

## 2018-04-23 PROCEDURE — 4040F PNEUMOC VAC/ADMIN/RCVD: CPT | Performed by: FAMILY MEDICINE

## 2018-04-23 PROCEDURE — G8419 CALC BMI OUT NRM PARAM NOF/U: HCPCS | Performed by: FAMILY MEDICINE

## 2018-04-23 PROCEDURE — 1036F TOBACCO NON-USER: CPT | Performed by: FAMILY MEDICINE

## 2018-04-23 RX ORDER — SULFAMETHOXAZOLE AND TRIMETHOPRIM 800; 160 MG/1; MG/1
1 TABLET ORAL 2 TIMES DAILY
Qty: 14 TABLET | Refills: 0 | Status: SHIPPED | OUTPATIENT
Start: 2018-04-23 | End: 2018-04-30

## 2018-04-23 RX ORDER — MUSCLE RUB CREAM 100; 150 MG/G; MG/G
CREAM TOPICAL
COMMUNITY
Start: 2018-02-20 | End: 2019-01-01

## 2018-04-25 LAB — URINE CULTURE, ROUTINE: NORMAL

## 2018-05-14 ENCOUNTER — OFFICE VISIT (OUTPATIENT)
Dept: PRIMARY CARE CLINIC | Age: 83
End: 2018-05-14

## 2018-05-14 VITALS
SYSTOLIC BLOOD PRESSURE: 100 MMHG | BODY MASS INDEX: 18.49 KG/M2 | RESPIRATION RATE: 18 BRPM | DIASTOLIC BLOOD PRESSURE: 58 MMHG | HEART RATE: 73 BPM | TEMPERATURE: 97 F | HEIGHT: 65 IN | WEIGHT: 111 LBS | OXYGEN SATURATION: 100 %

## 2018-05-14 DIAGNOSIS — B37.31 VAGINA, CANDIDIASIS: Primary | ICD-10-CM

## 2018-05-14 DIAGNOSIS — R31.9 HEMATURIA, UNSPECIFIED TYPE: ICD-10-CM

## 2018-05-14 LAB
INR BLD: 4.41 (ref 0.85–1.15)
PROTHROMBIN TIME: 49.8 SEC (ref 9.6–13)

## 2018-05-14 PROCEDURE — G8419 CALC BMI OUT NRM PARAM NOF/U: HCPCS | Performed by: FAMILY MEDICINE

## 2018-05-14 PROCEDURE — 1123F ACP DISCUSS/DSCN MKR DOCD: CPT | Performed by: FAMILY MEDICINE

## 2018-05-14 PROCEDURE — G8399 PT W/DXA RESULTS DOCUMENT: HCPCS | Performed by: FAMILY MEDICINE

## 2018-05-14 PROCEDURE — 1090F PRES/ABSN URINE INCON ASSESS: CPT | Performed by: FAMILY MEDICINE

## 2018-05-14 PROCEDURE — G8427 DOCREV CUR MEDS BY ELIG CLIN: HCPCS | Performed by: FAMILY MEDICINE

## 2018-05-14 PROCEDURE — 4040F PNEUMOC VAC/ADMIN/RCVD: CPT | Performed by: FAMILY MEDICINE

## 2018-05-14 PROCEDURE — 1036F TOBACCO NON-USER: CPT | Performed by: FAMILY MEDICINE

## 2018-05-14 PROCEDURE — 99213 OFFICE O/P EST LOW 20 MIN: CPT | Performed by: FAMILY MEDICINE

## 2018-05-14 RX ORDER — NYSTATIN 100000 U/G
CREAM TOPICAL
Qty: 1 TUBE | Refills: 0 | Status: SHIPPED | OUTPATIENT
Start: 2018-05-14 | End: 2019-01-01

## 2018-05-14 ASSESSMENT — PATIENT HEALTH QUESTIONNAIRE - PHQ9
SUM OF ALL RESPONSES TO PHQ QUESTIONS 1-9: 0
2. FEELING DOWN, DEPRESSED OR HOPELESS: 0
1. LITTLE INTEREST OR PLEASURE IN DOING THINGS: 0
SUM OF ALL RESPONSES TO PHQ9 QUESTIONS 1 & 2: 0

## 2018-05-17 ENCOUNTER — TELEPHONE (OUTPATIENT)
Dept: PRIMARY CARE CLINIC | Age: 83
End: 2018-05-17

## 2018-05-17 NOTE — TELEPHONE ENCOUNTER
Pt returned our call for lab results. I gave her Dr. Amada Ewing msg:    Notes recorded by Carola Mccarthy MD on 5/15/2018 at 3:07 PM EDT  Needs to reduce dose of Coumadin.  Should take 1/2 tab daily and retest in one week.  Should follow up with Dr Karthik Richardson soon. Pt understood. She has an appt with Dr. Karthik Richardson 6/13. She wants to know if she should just come into the office in one week for blood work. Please let pt know. Order is not in EPIC.      PATIENT:  [phone]

## 2018-06-13 ENCOUNTER — OFFICE VISIT (OUTPATIENT)
Dept: PRIMARY CARE CLINIC | Age: 83
End: 2018-06-13

## 2018-06-13 VITALS
HEART RATE: 65 BPM | DIASTOLIC BLOOD PRESSURE: 70 MMHG | SYSTOLIC BLOOD PRESSURE: 130 MMHG | WEIGHT: 110 LBS | TEMPERATURE: 97.1 F | HEIGHT: 64 IN | OXYGEN SATURATION: 100 % | BODY MASS INDEX: 18.78 KG/M2

## 2018-06-13 DIAGNOSIS — R53.83 OTHER FATIGUE: Primary | ICD-10-CM

## 2018-06-13 DIAGNOSIS — I48.20 CHRONIC ATRIAL FIBRILLATION (HCC): ICD-10-CM

## 2018-06-13 DIAGNOSIS — R53.83 OTHER FATIGUE: ICD-10-CM

## 2018-06-13 DIAGNOSIS — E03.9 ACQUIRED HYPOTHYROIDISM: ICD-10-CM

## 2018-06-13 LAB
A/G RATIO: 2 (ref 1.1–2.2)
ALBUMIN SERPL-MCNC: 4.7 G/DL (ref 3.4–5)
ALP BLD-CCNC: 46 U/L (ref 40–129)
ALT SERPL-CCNC: 12 U/L (ref 10–40)
ANION GAP SERPL CALCULATED.3IONS-SCNC: 20 MMOL/L (ref 3–16)
AST SERPL-CCNC: 21 U/L (ref 15–37)
ATYPICAL LYMPHOCYTE RELATIVE PERCENT: 1 % (ref 0–6)
BASOPHILS ABSOLUTE: 0.1 K/UL (ref 0–0.2)
BASOPHILS RELATIVE PERCENT: 2 %
BILIRUB SERPL-MCNC: 0.7 MG/DL (ref 0–1)
BILIRUBIN URINE: NEGATIVE
BLOOD, URINE: NEGATIVE
BUN BLDV-MCNC: 18 MG/DL (ref 7–20)
CALCIUM SERPL-MCNC: 9.3 MG/DL (ref 8.3–10.6)
CHLORIDE BLD-SCNC: 101 MMOL/L (ref 99–110)
CLARITY: CLEAR
CO2: 23 MMOL/L (ref 21–32)
COLOR: YELLOW
CREAT SERPL-MCNC: 0.9 MG/DL (ref 0.6–1.2)
EOSINOPHILS ABSOLUTE: 0 K/UL (ref 0–0.6)
EOSINOPHILS RELATIVE PERCENT: 0 %
GFR AFRICAN AMERICAN: >60
GFR NON-AFRICAN AMERICAN: 59
GLOBULIN: 2.3 G/DL
GLUCOSE BLD-MCNC: 83 MG/DL (ref 70–99)
GLUCOSE URINE: NEGATIVE MG/DL
HCT VFR BLD CALC: 26.1 % (ref 36–48)
HEMATOLOGY PATH CONSULT: YES
HEMOGLOBIN: 7.8 G/DL (ref 12–16)
HYPOCHROMIA: ABNORMAL
INR BLD: 1.45 (ref 0.86–1.14)
KETONES, URINE: NEGATIVE MG/DL
LEUKOCYTE ESTERASE, URINE: NEGATIVE
LYMPHOCYTES ABSOLUTE: 1.3 K/UL (ref 1–5.1)
LYMPHOCYTES RELATIVE PERCENT: 31 %
MCH RBC QN AUTO: 18.3 PG (ref 26–34)
MCHC RBC AUTO-ENTMCNC: 30.1 G/DL (ref 31–36)
MCV RBC AUTO: 60.8 FL (ref 80–100)
MICROCYTES: ABNORMAL
MICROSCOPIC EXAMINATION: NORMAL
MONOCYTES ABSOLUTE: 0.2 K/UL (ref 0–1.3)
MONOCYTES RELATIVE PERCENT: 6 %
NEUTROPHILS ABSOLUTE: 2.5 K/UL (ref 1.7–7.7)
NEUTROPHILS RELATIVE PERCENT: 60 %
NITRITE, URINE: NEGATIVE
PDW BLD-RTO: 22.3 % (ref 12.4–15.4)
PH UA: 6.5
PLATELET # BLD: 190 K/UL (ref 135–450)
PLATELET SLIDE REVIEW: ADEQUATE
PMV BLD AUTO: 9.3 FL (ref 5–10.5)
POTASSIUM SERPL-SCNC: 4.5 MMOL/L (ref 3.5–5.1)
PROTEIN UA: NEGATIVE MG/DL
PROTHROMBIN TIME: 16.5 SEC (ref 9.8–13)
RBC # BLD: 4.29 M/UL (ref 4–5.2)
SLIDE REVIEW: ABNORMAL
SODIUM BLD-SCNC: 144 MMOL/L (ref 136–145)
SPECIFIC GRAVITY UA: 1
TOTAL PROTEIN: 7 G/DL (ref 6.4–8.2)
TOXIC GRANULATION: PRESENT
TSH SERPL DL<=0.05 MIU/L-ACNC: 0.18 UIU/ML (ref 0.27–4.2)
URINE TYPE: NORMAL
UROBILINOGEN, URINE: 0.2 E.U./DL
WBC # BLD: 4.1 K/UL (ref 4–11)

## 2018-06-13 PROCEDURE — 1036F TOBACCO NON-USER: CPT | Performed by: INTERNAL MEDICINE

## 2018-06-13 PROCEDURE — 99214 OFFICE O/P EST MOD 30 MIN: CPT | Performed by: INTERNAL MEDICINE

## 2018-06-13 PROCEDURE — 4040F PNEUMOC VAC/ADMIN/RCVD: CPT | Performed by: INTERNAL MEDICINE

## 2018-06-13 PROCEDURE — 1090F PRES/ABSN URINE INCON ASSESS: CPT | Performed by: INTERNAL MEDICINE

## 2018-06-13 PROCEDURE — G8420 CALC BMI NORM PARAMETERS: HCPCS | Performed by: INTERNAL MEDICINE

## 2018-06-13 PROCEDURE — 1123F ACP DISCUSS/DSCN MKR DOCD: CPT | Performed by: INTERNAL MEDICINE

## 2018-06-13 PROCEDURE — G8427 DOCREV CUR MEDS BY ELIG CLIN: HCPCS | Performed by: INTERNAL MEDICINE

## 2018-06-13 PROCEDURE — G8399 PT W/DXA RESULTS DOCUMENT: HCPCS | Performed by: INTERNAL MEDICINE

## 2018-06-13 RX ORDER — LEVOTHYROXINE SODIUM 112 UG/1
112 TABLET ORAL DAILY
Qty: 30 TABLET | Refills: 3 | Status: SHIPPED | OUTPATIENT
Start: 2018-06-13 | End: 2018-06-14 | Stop reason: SDUPTHER

## 2018-06-13 ASSESSMENT — ENCOUNTER SYMPTOMS
CHEST TIGHTNESS: 0
SHORTNESS OF BREATH: 0
EYES NEGATIVE: 1
WHEEZING: 0
ABDOMINAL PAIN: 0
BACK PAIN: 0
ABDOMINAL DISTENTION: 0

## 2018-06-14 DIAGNOSIS — E03.9 ACQUIRED HYPOTHYROIDISM: ICD-10-CM

## 2018-06-14 LAB — HEMATOLOGY PATH CONSULT: NORMAL

## 2018-06-14 RX ORDER — LEVOTHYROXINE SODIUM 0.1 MG/1
100 TABLET ORAL DAILY
Qty: 30 TABLET | Refills: 3 | Status: SHIPPED | OUTPATIENT
Start: 2018-06-14 | End: 2018-06-18 | Stop reason: SDUPTHER

## 2018-06-18 DIAGNOSIS — E03.9 ACQUIRED HYPOTHYROIDISM: ICD-10-CM

## 2018-06-18 RX ORDER — LEVOTHYROXINE SODIUM 0.1 MG/1
100 TABLET ORAL DAILY
Qty: 90 TABLET | Refills: 3 | Status: SHIPPED | OUTPATIENT
Start: 2018-06-18 | End: 2019-01-01 | Stop reason: SDUPTHER

## 2018-06-22 DIAGNOSIS — I48.91 ATRIAL FIBRILLATION, UNSPECIFIED TYPE (HCC): Primary | ICD-10-CM

## 2018-06-22 LAB
INR BLD: 3.27 (ref 0.86–1.14)
PROTHROMBIN TIME: 37.3 SEC (ref 9.8–13)

## 2018-06-26 ENCOUNTER — TELEPHONE (OUTPATIENT)
Dept: PRIMARY CARE CLINIC | Age: 83
End: 2018-06-26

## 2018-06-26 DIAGNOSIS — M81.0 OSTEOPOROSIS: ICD-10-CM

## 2018-06-28 ENCOUNTER — OFFICE VISIT (OUTPATIENT)
Dept: PRIMARY CARE CLINIC | Age: 83
End: 2018-06-28

## 2018-06-28 VITALS
SYSTOLIC BLOOD PRESSURE: 132 MMHG | OXYGEN SATURATION: 99 % | HEIGHT: 64 IN | TEMPERATURE: 97.2 F | WEIGHT: 109 LBS | BODY MASS INDEX: 18.61 KG/M2 | HEART RATE: 70 BPM | DIASTOLIC BLOOD PRESSURE: 71 MMHG

## 2018-06-28 DIAGNOSIS — K62.5 RECTAL BLEEDING: Primary | ICD-10-CM

## 2018-06-28 PROCEDURE — 4040F PNEUMOC VAC/ADMIN/RCVD: CPT | Performed by: INTERNAL MEDICINE

## 2018-06-28 PROCEDURE — 1123F ACP DISCUSS/DSCN MKR DOCD: CPT | Performed by: INTERNAL MEDICINE

## 2018-06-28 PROCEDURE — 1036F TOBACCO NON-USER: CPT | Performed by: INTERNAL MEDICINE

## 2018-06-28 PROCEDURE — G8399 PT W/DXA RESULTS DOCUMENT: HCPCS | Performed by: INTERNAL MEDICINE

## 2018-06-28 PROCEDURE — 99213 OFFICE O/P EST LOW 20 MIN: CPT | Performed by: INTERNAL MEDICINE

## 2018-06-28 PROCEDURE — G8427 DOCREV CUR MEDS BY ELIG CLIN: HCPCS | Performed by: INTERNAL MEDICINE

## 2018-06-28 PROCEDURE — G8420 CALC BMI NORM PARAMETERS: HCPCS | Performed by: INTERNAL MEDICINE

## 2018-06-28 PROCEDURE — 1090F PRES/ABSN URINE INCON ASSESS: CPT | Performed by: INTERNAL MEDICINE

## 2018-06-29 ASSESSMENT — ENCOUNTER SYMPTOMS
ANAL BLEEDING: 1
VOMITING: 0
NAUSEA: 0
DIARRHEA: 0
CONSTIPATION: 0
RECTAL PAIN: 1
COUGH: 0
ABDOMINAL PAIN: 0
SHORTNESS OF BREATH: 0
BACK PAIN: 0
ABDOMINAL DISTENTION: 0

## 2018-07-02 DIAGNOSIS — K62.5 RECTAL BLEEDING: ICD-10-CM

## 2018-07-02 LAB
BASOPHILS ABSOLUTE: 0.1 K/UL (ref 0–0.2)
BASOPHILS RELATIVE PERCENT: 3 %
EOSINOPHILS ABSOLUTE: 0 K/UL (ref 0–0.6)
EOSINOPHILS RELATIVE PERCENT: 0 %
HCT VFR BLD CALC: 26.3 % (ref 36–48)
HEMATOLOGY PATH CONSULT: NO
HEMOGLOBIN: 7.8 G/DL (ref 12–16)
HYPOCHROMIA: ABNORMAL
INR BLD: 2.93 (ref 0.86–1.14)
LYMPHOCYTES ABSOLUTE: 0.6 K/UL (ref 1–5.1)
LYMPHOCYTES RELATIVE PERCENT: 17 %
MCH RBC QN AUTO: 18.2 PG (ref 26–34)
MCHC RBC AUTO-ENTMCNC: 29.9 G/DL (ref 31–36)
MCV RBC AUTO: 61 FL (ref 80–100)
MICROCYTES: ABNORMAL
MONOCYTES ABSOLUTE: 0.3 K/UL (ref 0–1.3)
MONOCYTES RELATIVE PERCENT: 8 %
NEUTROPHILS ABSOLUTE: 2.6 K/UL (ref 1.7–7.7)
NEUTROPHILS RELATIVE PERCENT: 72 %
OVALOCYTES: ABNORMAL
PDW BLD-RTO: 23.3 % (ref 12.4–15.4)
PLATELET # BLD: 186 K/UL (ref 135–450)
PMV BLD AUTO: 9.1 FL (ref 5–10.5)
PROTHROMBIN TIME: 33.4 SEC (ref 9.8–13)
RBC # BLD: 4.3 M/UL (ref 4–5.2)
SCHISTOCYTES: ABNORMAL
TARGET CELLS: ABNORMAL
WBC # BLD: 3.6 K/UL (ref 4–11)

## 2018-07-29 PROBLEM — I48.91 A-FIB (HCC): Status: ACTIVE | Noted: 2018-07-29

## 2018-08-09 ENCOUNTER — OFFICE VISIT (OUTPATIENT)
Dept: PRIMARY CARE CLINIC | Age: 83
End: 2018-08-09

## 2018-08-09 VITALS
TEMPERATURE: 97.1 F | WEIGHT: 108 LBS | SYSTOLIC BLOOD PRESSURE: 100 MMHG | HEART RATE: 60 BPM | HEIGHT: 64 IN | OXYGEN SATURATION: 100 % | BODY MASS INDEX: 18.44 KG/M2 | DIASTOLIC BLOOD PRESSURE: 60 MMHG

## 2018-08-09 DIAGNOSIS — E87.6 HYPOKALEMIA: ICD-10-CM

## 2018-08-09 DIAGNOSIS — E03.9 ACQUIRED HYPOTHYROIDISM: ICD-10-CM

## 2018-08-09 DIAGNOSIS — Z09 HOSPITAL DISCHARGE FOLLOW-UP: ICD-10-CM

## 2018-08-09 DIAGNOSIS — D50.0 IRON DEFICIENCY ANEMIA DUE TO CHRONIC BLOOD LOSS: ICD-10-CM

## 2018-08-09 LAB
A/G RATIO: 2 (ref 1.1–2.2)
ALBUMIN SERPL-MCNC: 4.6 G/DL (ref 3.4–5)
ALP BLD-CCNC: 50 U/L (ref 40–129)
ALT SERPL-CCNC: 14 U/L (ref 10–40)
ANION GAP SERPL CALCULATED.3IONS-SCNC: 13 MMOL/L (ref 3–16)
ANISOCYTOSIS: ABNORMAL
AST SERPL-CCNC: 19 U/L (ref 15–37)
BASOPHILS ABSOLUTE: 0.1 K/UL (ref 0–0.2)
BASOPHILS RELATIVE PERCENT: 2 %
BILIRUB SERPL-MCNC: 0.5 MG/DL (ref 0–1)
BUN BLDV-MCNC: 17 MG/DL (ref 7–20)
CALCIUM SERPL-MCNC: 9.2 MG/DL (ref 8.3–10.6)
CHLORIDE BLD-SCNC: 103 MMOL/L (ref 99–110)
CO2: 28 MMOL/L (ref 21–32)
CREAT SERPL-MCNC: 1 MG/DL (ref 0.6–1.2)
EOSINOPHILS ABSOLUTE: 0 K/UL (ref 0–0.6)
EOSINOPHILS RELATIVE PERCENT: 0 %
FOLATE: 16.03 NG/ML (ref 4.78–24.2)
GFR AFRICAN AMERICAN: >60
GFR NON-AFRICAN AMERICAN: 53
GLOBULIN: 2.3 G/DL
GLUCOSE BLD-MCNC: 104 MG/DL (ref 70–99)
HCT VFR BLD CALC: 30 % (ref 36–48)
HEMATOLOGY PATH CONSULT: NO
HEMOGLOBIN: 8.9 G/DL (ref 12–16)
HYPOCHROMIA: ABNORMAL
LYMPHOCYTES ABSOLUTE: 1.5 K/UL (ref 1–5.1)
LYMPHOCYTES RELATIVE PERCENT: 35 %
MCH RBC QN AUTO: 19.4 PG (ref 26–34)
MCHC RBC AUTO-ENTMCNC: 29.8 G/DL (ref 31–36)
MCV RBC AUTO: 65.2 FL (ref 80–100)
MONOCYTES ABSOLUTE: 0.3 K/UL (ref 0–1.3)
MONOCYTES RELATIVE PERCENT: 6 %
NEUTROPHILS ABSOLUTE: 2.5 K/UL (ref 1.7–7.7)
NEUTROPHILS RELATIVE PERCENT: 57 %
OVALOCYTES: ABNORMAL
PDW BLD-RTO: 27.6 % (ref 12.4–15.4)
PLATELET # BLD: 174 K/UL (ref 135–450)
PMV BLD AUTO: 9.3 FL (ref 5–10.5)
POTASSIUM SERPL-SCNC: 4 MMOL/L (ref 3.5–5.1)
RBC # BLD: 4.6 M/UL (ref 4–5.2)
SODIUM BLD-SCNC: 144 MMOL/L (ref 136–145)
TEAR DROP CELLS: ABNORMAL
TOTAL PROTEIN: 6.9 G/DL (ref 6.4–8.2)
TSH SERPL DL<=0.05 MIU/L-ACNC: 0.37 UIU/ML (ref 0.27–4.2)
VITAMIN B-12: 857 PG/ML (ref 211–911)
WBC # BLD: 4.3 K/UL (ref 4–11)

## 2018-08-09 PROCEDURE — G8420 CALC BMI NORM PARAMETERS: HCPCS | Performed by: INTERNAL MEDICINE

## 2018-08-09 PROCEDURE — 4040F PNEUMOC VAC/ADMIN/RCVD: CPT | Performed by: INTERNAL MEDICINE

## 2018-08-09 PROCEDURE — 1036F TOBACCO NON-USER: CPT | Performed by: INTERNAL MEDICINE

## 2018-08-09 PROCEDURE — 1090F PRES/ABSN URINE INCON ASSESS: CPT | Performed by: INTERNAL MEDICINE

## 2018-08-09 PROCEDURE — 1123F ACP DISCUSS/DSCN MKR DOCD: CPT | Performed by: INTERNAL MEDICINE

## 2018-08-09 PROCEDURE — G8399 PT W/DXA RESULTS DOCUMENT: HCPCS | Performed by: INTERNAL MEDICINE

## 2018-08-09 PROCEDURE — G8427 DOCREV CUR MEDS BY ELIG CLIN: HCPCS | Performed by: INTERNAL MEDICINE

## 2018-08-09 PROCEDURE — 1101F PT FALLS ASSESS-DOCD LE1/YR: CPT | Performed by: INTERNAL MEDICINE

## 2018-08-09 PROCEDURE — 99214 OFFICE O/P EST MOD 30 MIN: CPT | Performed by: INTERNAL MEDICINE

## 2018-08-09 PROCEDURE — 1111F DSCHRG MED/CURRENT MED MERGE: CPT | Performed by: INTERNAL MEDICINE

## 2018-08-09 RX ORDER — PANTOPRAZOLE SODIUM 40 MG/1
40 TABLET, DELAYED RELEASE ORAL DAILY
Qty: 30 TABLET | Refills: 3 | Status: SHIPPED | OUTPATIENT
Start: 2018-08-09 | End: 2019-01-01 | Stop reason: SDUPTHER

## 2018-08-09 ASSESSMENT — ENCOUNTER SYMPTOMS
CHEST TIGHTNESS: 0
ABDOMINAL DISTENTION: 0
EYES NEGATIVE: 1
BACK PAIN: 0
ABDOMINAL PAIN: 0
SHORTNESS OF BREATH: 0
WHEEZING: 0

## 2018-08-09 NOTE — PROGRESS NOTES
tablets by mouth daily 180 tablet 5    acetaminophen (APAP EXTRA STRENGTH) 500 MG tablet Take 2 tablets by mouth every 6 hours as needed for Pain 120 tablet 3        Medications patient taking as of now reconciled against medications ordered at time of hospital discharge:     Chief Complaint   Patient presents with   [address] from Hospital       HPI    Inpatient course: Discharge summary reviewed- see chart. Review of Systems    Vitals:    08/09/18 1436 08/09/18 1458   BP: (!) 121/58 100/60   Pulse: 57 60   Temp: 97.1 °F (36.2 °C)    TempSrc: Oral    SpO2: 100%    Weight: 108 lb (49 kg)    Height: 5' 4\" (1.626 m)      Body mass index is 18.54 kg/m². Wt Readings from Last 3 Encounters:   08/09/18 108 lb (49 kg)   07/31/18 108 lb 7.5 oz (49.2 kg)   06/28/18 109 lb (49.4 kg)     BP Readings from Last 3 Encounters:   08/09/18 100/60   07/31/18 (!) 113/39   06/28/18 132/71       Physical Exam        Assessment/Plan:  1. Iron deficiency anemia due to chronic blood loss    - CBC Auto Differential; Future  - VITAMIN B12 & FOLATE; Future  - Polysaccharide Iron Complex (PROFE) 391.3 (180 Fe) MG CAPS; Take 180 mg by mouth daily  Dispense: 90 capsule; Refill: 3    2. Hypokalemia    - Comprehensive Metabolic Panel; Future    3. Acquired hypothyroidism    - TSH without Reflex; Future    4.  Hospital discharge follow-up    - [place] DISCHARGE MEDS RECONCILED W/ CURRENT OUTPATIENT MED LIST        Medical Decision Making

## 2018-09-12 ENCOUNTER — OFFICE VISIT (OUTPATIENT)
Dept: PRIMARY CARE CLINIC | Age: 83
End: 2018-09-12

## 2018-09-12 VITALS
DIASTOLIC BLOOD PRESSURE: 60 MMHG | HEIGHT: 64 IN | BODY MASS INDEX: 18.61 KG/M2 | OXYGEN SATURATION: 100 % | WEIGHT: 109 LBS | HEART RATE: 62 BPM | TEMPERATURE: 98.3 F | SYSTOLIC BLOOD PRESSURE: 120 MMHG

## 2018-09-12 DIAGNOSIS — Z23 FLU VACCINE NEED: ICD-10-CM

## 2018-09-12 DIAGNOSIS — D50.0 IRON DEFICIENCY ANEMIA DUE TO CHRONIC BLOOD LOSS: ICD-10-CM

## 2018-09-12 DIAGNOSIS — I48.20 CHRONIC ATRIAL FIBRILLATION (HCC): ICD-10-CM

## 2018-09-12 DIAGNOSIS — M54.50 ACUTE MIDLINE LOW BACK PAIN WITHOUT SCIATICA: Primary | ICD-10-CM

## 2018-09-12 LAB
INR BLD: 3.11 (ref 0.86–1.14)
PROTHROMBIN TIME: 35.4 SEC (ref 9.8–13)

## 2018-09-12 PROCEDURE — 1101F PT FALLS ASSESS-DOCD LE1/YR: CPT | Performed by: INTERNAL MEDICINE

## 2018-09-12 PROCEDURE — G8420 CALC BMI NORM PARAMETERS: HCPCS | Performed by: INTERNAL MEDICINE

## 2018-09-12 PROCEDURE — 1036F TOBACCO NON-USER: CPT | Performed by: INTERNAL MEDICINE

## 2018-09-12 PROCEDURE — G8399 PT W/DXA RESULTS DOCUMENT: HCPCS | Performed by: INTERNAL MEDICINE

## 2018-09-12 PROCEDURE — 99213 OFFICE O/P EST LOW 20 MIN: CPT | Performed by: INTERNAL MEDICINE

## 2018-09-12 PROCEDURE — G8427 DOCREV CUR MEDS BY ELIG CLIN: HCPCS | Performed by: INTERNAL MEDICINE

## 2018-09-12 PROCEDURE — 4040F PNEUMOC VAC/ADMIN/RCVD: CPT | Performed by: INTERNAL MEDICINE

## 2018-09-12 PROCEDURE — 1123F ACP DISCUSS/DSCN MKR DOCD: CPT | Performed by: INTERNAL MEDICINE

## 2018-09-12 PROCEDURE — 1090F PRES/ABSN URINE INCON ASSESS: CPT | Performed by: INTERNAL MEDICINE

## 2018-09-12 RX ORDER — LIDOCAINE 50 MG/G
1-2 PATCH TOPICAL DAILY
Qty: 60 PATCH | Refills: 1 | Status: SHIPPED | OUTPATIENT
Start: 2018-09-12 | End: 2018-12-04

## 2018-09-12 ASSESSMENT — ENCOUNTER SYMPTOMS
ABDOMINAL PAIN: 0
BACK PAIN: 1
CHEST TIGHTNESS: 0
SHORTNESS OF BREATH: 0
WHEEZING: 0
EYES NEGATIVE: 1
ABDOMINAL DISTENTION: 0

## 2018-09-12 NOTE — PROGRESS NOTES
Subjective:      Patient ID: Jase Lo is a 80 y.o. female. Review of Systems   Constitutional: Negative for activity change and chills. Pneumonia vac  ; prevnar   Td ap    Flu vac    Zostavac    HENT:        Partials . Eyes: Negative. Glasses ; Last eye ex 4/15 . S/P Bilat cataract surgery   Respiratory: Negative for chest tightness, shortness of breath and wheezing. Cough: off and on          Does not smoke ; No Etoh    Cardiovascular: Negative. No HTN / CAD . No FH either     H/O Atrial Fib > on Coumadin    Gastrointestinal: Negative for abdominal distention and abdominal pain. Colonoscopy >    Endocrine:        No diabetes    Genitourinary: Negative for dysuria, urgency, vaginal discharge (itching) and vaginal pain. Frequency: off and on n. Hysterectromy   One daughter  in accident at 25     Mammogram 9/15    Musculoskeletal: Positive for arthralgias and back pain. DEXA  9/15  ; osteoporosis    Allergic/Immunologic: Negative for food allergies. Neurological: Negative for speech difficulty and numbness. Psychiatric/Behavioral: Negative for behavioral problems and sleep disturbance. The patient is nervous/anxious. Objective:   Physical Exam   Constitutional: She is oriented to person, place, and time. Eyes: Conjunctivae are normal.   Neck: Neck supple. Cardiovascular: Normal rate and normal heart sounds. An irregular rhythm present. Pulmonary/Chest: Breath sounds normal. She has no wheezes. She has no rales. Abdominal: She exhibits no distension. There is no tenderness. Musculoskeletal: Normal range of motion. She exhibits tenderness (discomfort lower back) and deformity. She exhibits no edema. Neurological: She is alert and oriented to person, place, and time. Skin: Skin is warm. Vitals reviewed. Assessment:      Armaan was seen today for back pain and check-up.     Diagnoses and all with:  Follow-Up from [address] [place] [address] Drive date: 7/29/2018    Discharge date  7/31/2018      Armaan Barker is a 80 y. o. female with h/o HTN , AFib ( Ac with coumadin ) presented wth c/o palpitations . She was hearing her heart beating in her ears . She also had  headache and neck pain . In the ER she had Afib with RVR wth Heart rate in 180's; was given cardizen injection and was started on Cardizem drip . No chest pain , dyspnea    Patient was mainly admitted for ringing in the ER and palpitations thought to be related to afib with rvr. Cardiology was consulted, with the rate control method she was slightly bradycardic. She was sent home on beta blockers    TSH was normal.   She was also found to have iron def anemia. Anemia might be contributing to her afib too. Got  IV iron during the stay. I      Back Pain   Pertinent negatives include no abdominal pain, dysuria or numbness. Inpatient course: Discharge summary reviewed- see chart.         Review of Systems               Physical Exam

## 2018-09-14 LAB
BASOPHILS ABSOLUTE: 0 K/UL (ref 0–0.2)
BASOPHILS RELATIVE PERCENT: 0 %
BURR CELLS: ABNORMAL
EOSINOPHILS ABSOLUTE: 0 K/UL (ref 0–0.6)
EOSINOPHILS RELATIVE PERCENT: 0 %
HCT VFR BLD CALC: 32.9 % (ref 36–48)
HEMATOLOGY PATH CONSULT: NO
HEMOGLOBIN: 10 G/DL (ref 12–16)
HYPOCHROMIA: ABNORMAL
LYMPHOCYTES ABSOLUTE: 0.9 K/UL (ref 1–5.1)
LYMPHOCYTES RELATIVE PERCENT: 22 %
MCH RBC QN AUTO: 20 PG (ref 26–34)
MCHC RBC AUTO-ENTMCNC: 30.3 G/DL (ref 31–36)
MCV RBC AUTO: 66.1 FL (ref 80–100)
MICROCYTES: ABNORMAL
MONOCYTES ABSOLUTE: 0.4 K/UL (ref 0–1.3)
MONOCYTES RELATIVE PERCENT: 9 %
NEUTROPHILS ABSOLUTE: 2.7 K/UL (ref 1.7–7.7)
NEUTROPHILS RELATIVE PERCENT: 69 %
PDW BLD-RTO: 26.5 % (ref 12.4–15.4)
PLATELET # BLD: 190 K/UL (ref 135–450)
PLATELET SLIDE REVIEW: ADEQUATE
PMV BLD AUTO: 9 FL (ref 5–10.5)
POIKILOCYTES: ABNORMAL
RBC # BLD: 4.98 M/UL (ref 4–5.2)
SLIDE REVIEW: ABNORMAL
WBC # BLD: 3.9 K/UL (ref 4–11)

## 2018-09-21 DIAGNOSIS — I48.91 ATRIAL FIBRILLATION, UNSPECIFIED TYPE (HCC): ICD-10-CM

## 2018-09-21 LAB
INR BLD: 3.58 (ref 0.86–1.14)
PROTHROMBIN TIME: 40.8 SEC (ref 9.8–13)

## 2018-09-27 NOTE — PROGRESS NOTES
[address]   Electrophysiology Consultation   Date: 10/2/2018  Reason for Consultation: Atrial fibrillation   Consult Requesting Physician: Дмитрий Haile MD     CC: Palpitation   HPI: Song Coello is a 80 y.o. female w/PMH of permanent chronic Afib, HTN, iron deficiency anemia, and hypothyroidism. On 7/29/18, she presented to the ED with palpitations, feeling fast heartbeat, and hearing her heart beating in her ears . She also reports headache and neck pain . Per chart review- she was in Afib with RVR wth heart rate in 180's while in the ED. After IV diltiazem and transitioning to diltiazem 60 mg po bid, she remained in AF with controlled rates. Diltiazem was stopped while in hospital due to bradycardia. Denies history of cardioversion or ablations. She is on warfarin, managed by PCP. ECGs dating back to at least 2013 showing afib.     Here today as new patient for management of Afib. ECG today shows Afib, rate controlled. She feels she goes into Afib under stressful circumstances. At time of hospitalization in July, her sister-in-law had passed. Denies complaints of CP, dizziness, palpitations, orthopnea, or presycope/syncope. She does become NELSON with stairs in her home. 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 She has never used smokeless tobacco. She reports that she does not drink alcohol or use drugs. Family History:  Reviewed.  Denies regional wall motion abnormalities are seen.   -Grade I diastolic dysfunction with normal LV filling pressures. E/e\"=10.75   -Mild to moderate mitral regurgitation.   -Aortic valve appears sclerotic but opens adequately.   -Mild aortic regurgitation.   -Mild tricuspid regurgitation with RVSP of 38 mmHg.   -Dilated left atrium with a volume of 55 ml.   -The right heart is moderately dilated. Medication:  Current Outpatient Prescriptions   Medication Sig Dispense Refill    lidocaine (LIDODERM) 5 % Place 1-2 patches onto the skin daily 12 hours on, 12 hours off. 60 patch 1    pantoprazole (PROTONIX) 40 MG tablet Take 1 tablet by mouth daily 30 tablet 3    Polysaccharide Iron Complex (PROFE) 391.3 (180 Fe) MG CAPS Take 180 mg by mouth daily 90 capsule 3    calcium carbonate-vitamin D (CALTRATE) 600-400 MG-UNIT TABS per tab TAKE ONE TABLET BY MOUTH DAILY 30 tablet 8    levothyroxine (SYNTHROID) 100 MCG tablet Take 1 tablet by mouth daily 90 tablet 3    nystatin (MYCOSTATIN) 013123 UNIT/GM cream Apply topically 2 times daily. 1 Tube 0    Menthol-Methyl Salicylate (MUSCLE RUB) 10-15 % CREA cream       warfarin (COUMADIN) 2.5 MG tablet Take 1 tablet by mouth daily 90 tablet 3    CALCIUM CITRATE, BARIATRIC ADVANTAGE, 500MG LOZENGE Take 2 lozenges by mouth daily 60 lozenge 12    calcium carbonate-vitamin D (CALTRATE 600+D) 600-400 MG-UNIT TABS per tab Take 2 tablets by mouth daily 180 tablet 5    acetaminophen (APAP EXTRA STRENGTH) 500 MG tablet Take 2 tablets by mouth every 6 hours as needed for Pain 120 tablet 3     No current facility-administered medications for this visit. Assessment and plan:     Chronic permanent atrial fibrillation:    Patient has been in atrial fibrillation for years. ECGs dating back to 2013, at least, document Afib. Denies having any chest pressure or shortness of breath. She had one episodes of palpitation and came to the emergency room.   She stated that she had an argument

## 2018-10-01 ENCOUNTER — NURSE ONLY (OUTPATIENT)
Dept: PRIMARY CARE CLINIC | Age: 83
End: 2018-10-01
Payer: MEDICARE

## 2018-10-01 DIAGNOSIS — Z23 FLU VACCINE NEED: Primary | ICD-10-CM

## 2018-10-01 DIAGNOSIS — I48.91 ATRIAL FIBRILLATION, UNSPECIFIED TYPE (HCC): ICD-10-CM

## 2018-10-01 LAB
INR BLD: 3.14 (ref 0.86–1.14)
PROTHROMBIN TIME: 35.8 SEC (ref 9.8–13)

## 2018-10-01 PROCEDURE — G0008 ADMIN INFLUENZA VIRUS VAC: HCPCS | Performed by: INTERNAL MEDICINE

## 2018-10-01 PROCEDURE — 90662 IIV NO PRSV INCREASED AG IM: CPT | Performed by: INTERNAL MEDICINE

## 2018-10-02 ENCOUNTER — OFFICE VISIT (OUTPATIENT)
Dept: CARDIOLOGY CLINIC | Age: 83
End: 2018-10-02
Payer: MEDICARE

## 2018-10-02 VITALS
HEIGHT: 64 IN | WEIGHT: 109 LBS | BODY MASS INDEX: 18.61 KG/M2 | SYSTOLIC BLOOD PRESSURE: 134 MMHG | DIASTOLIC BLOOD PRESSURE: 68 MMHG

## 2018-10-02 DIAGNOSIS — I48.19 PERSISTENT ATRIAL FIBRILLATION (HCC): Primary | ICD-10-CM

## 2018-10-02 DIAGNOSIS — E78.2 MIXED HYPERLIPIDEMIA: ICD-10-CM

## 2018-10-02 DIAGNOSIS — I10 ESSENTIAL HYPERTENSION: ICD-10-CM

## 2018-10-02 PROCEDURE — 1090F PRES/ABSN URINE INCON ASSESS: CPT | Performed by: INTERNAL MEDICINE

## 2018-10-02 PROCEDURE — 1036F TOBACCO NON-USER: CPT | Performed by: INTERNAL MEDICINE

## 2018-10-02 PROCEDURE — G8427 DOCREV CUR MEDS BY ELIG CLIN: HCPCS | Performed by: INTERNAL MEDICINE

## 2018-10-02 PROCEDURE — 1123F ACP DISCUSS/DSCN MKR DOCD: CPT | Performed by: INTERNAL MEDICINE

## 2018-10-02 PROCEDURE — G8420 CALC BMI NORM PARAMETERS: HCPCS | Performed by: INTERNAL MEDICINE

## 2018-10-02 PROCEDURE — 93000 ELECTROCARDIOGRAM COMPLETE: CPT | Performed by: INTERNAL MEDICINE

## 2018-10-02 PROCEDURE — 99214 OFFICE O/P EST MOD 30 MIN: CPT | Performed by: INTERNAL MEDICINE

## 2018-10-02 PROCEDURE — G8482 FLU IMMUNIZE ORDER/ADMIN: HCPCS | Performed by: INTERNAL MEDICINE

## 2018-10-02 PROCEDURE — 4040F PNEUMOC VAC/ADMIN/RCVD: CPT | Performed by: INTERNAL MEDICINE

## 2018-10-02 PROCEDURE — 1101F PT FALLS ASSESS-DOCD LE1/YR: CPT | Performed by: INTERNAL MEDICINE

## 2018-10-02 PROCEDURE — G8399 PT W/DXA RESULTS DOCUMENT: HCPCS | Performed by: INTERNAL MEDICINE

## 2018-10-08 ENCOUNTER — TELEPHONE (OUTPATIENT)
Dept: PRIMARY CARE CLINIC | Age: 83
End: 2018-10-08

## 2018-10-09 DIAGNOSIS — I48.91 ATRIAL FIBRILLATION, UNSPECIFIED TYPE (HCC): ICD-10-CM

## 2018-10-09 LAB
INR BLD: 3.21 (ref 0.86–1.14)
PROTHROMBIN TIME: 36.6 SEC (ref 9.8–13)

## 2018-10-16 ENCOUNTER — TELEPHONE (OUTPATIENT)
Dept: PRIMARY CARE CLINIC | Age: 83
End: 2018-10-16

## 2018-10-17 DIAGNOSIS — I48.91 ATRIAL FIBRILLATION, UNSPECIFIED TYPE (HCC): ICD-10-CM

## 2018-10-17 LAB
INR BLD: 4.53 (ref 0.86–1.14)
PROTHROMBIN TIME: 51.6 SEC (ref 9.8–13)

## 2018-10-18 ENCOUNTER — TELEPHONE (OUTPATIENT)
Dept: PRIMARY CARE CLINIC | Age: 83
End: 2018-10-18

## 2018-10-25 DIAGNOSIS — I48.91 ATRIAL FIBRILLATION, UNSPECIFIED TYPE (HCC): ICD-10-CM

## 2018-10-25 LAB
INR BLD: 3 (ref 0.86–1.14)
PROTHROMBIN TIME: 34.2 SEC (ref 9.8–13)

## 2018-10-30 ENCOUNTER — TELEPHONE (OUTPATIENT)
Dept: CARDIOLOGY CLINIC | Age: 83
End: 2018-10-30

## 2018-11-01 PROCEDURE — 93228 REMOTE 30 DAY ECG REV/REPORT: CPT | Performed by: INTERNAL MEDICINE

## 2018-11-07 ENCOUNTER — TELEPHONE (OUTPATIENT)
Dept: PRIMARY CARE CLINIC | Age: 83
End: 2018-11-07

## 2018-11-08 ENCOUNTER — TELEPHONE (OUTPATIENT)
Dept: PRIMARY CARE CLINIC | Age: 83
End: 2018-11-08

## 2018-11-13 ENCOUNTER — OFFICE VISIT (OUTPATIENT)
Dept: PRIMARY CARE CLINIC | Age: 83
End: 2018-11-13
Payer: MEDICARE

## 2018-11-13 VITALS
SYSTOLIC BLOOD PRESSURE: 120 MMHG | HEIGHT: 64 IN | WEIGHT: 108 LBS | OXYGEN SATURATION: 97 % | BODY MASS INDEX: 18.44 KG/M2 | HEART RATE: 53 BPM | TEMPERATURE: 98.1 F | DIASTOLIC BLOOD PRESSURE: 70 MMHG

## 2018-11-13 DIAGNOSIS — I48.20 CHRONIC ATRIAL FIBRILLATION (HCC): ICD-10-CM

## 2018-11-13 DIAGNOSIS — E03.9 ACQUIRED HYPOTHYROIDISM: ICD-10-CM

## 2018-11-13 DIAGNOSIS — G44.89 OTHER HEADACHE SYNDROME: Primary | ICD-10-CM

## 2018-11-13 DIAGNOSIS — R42 DIZZINESS: ICD-10-CM

## 2018-11-13 DIAGNOSIS — G44.89 OTHER HEADACHE SYNDROME: ICD-10-CM

## 2018-11-13 LAB
INR BLD: 5.18 (ref 0.86–1.14)
PROTHROMBIN TIME: 59.1 SEC (ref 9.8–13)
SEDIMENTATION RATE, ERYTHROCYTE: 13 MM/HR (ref 0–30)
TSH SERPL DL<=0.05 MIU/L-ACNC: 0.72 UIU/ML (ref 0.27–4.2)

## 2018-11-13 PROCEDURE — G8482 FLU IMMUNIZE ORDER/ADMIN: HCPCS | Performed by: INTERNAL MEDICINE

## 2018-11-13 PROCEDURE — 1036F TOBACCO NON-USER: CPT | Performed by: INTERNAL MEDICINE

## 2018-11-13 PROCEDURE — 4040F PNEUMOC VAC/ADMIN/RCVD: CPT | Performed by: INTERNAL MEDICINE

## 2018-11-13 PROCEDURE — 1101F PT FALLS ASSESS-DOCD LE1/YR: CPT | Performed by: INTERNAL MEDICINE

## 2018-11-13 PROCEDURE — G8427 DOCREV CUR MEDS BY ELIG CLIN: HCPCS | Performed by: INTERNAL MEDICINE

## 2018-11-13 PROCEDURE — 99214 OFFICE O/P EST MOD 30 MIN: CPT | Performed by: INTERNAL MEDICINE

## 2018-11-13 PROCEDURE — 1090F PRES/ABSN URINE INCON ASSESS: CPT | Performed by: INTERNAL MEDICINE

## 2018-11-13 PROCEDURE — 1123F ACP DISCUSS/DSCN MKR DOCD: CPT | Performed by: INTERNAL MEDICINE

## 2018-11-13 PROCEDURE — G8420 CALC BMI NORM PARAMETERS: HCPCS | Performed by: INTERNAL MEDICINE

## 2018-11-13 ASSESSMENT — ENCOUNTER SYMPTOMS
ABDOMINAL PAIN: 0
COUGH: 0
SHORTNESS OF BREATH: 0
GASTROINTESTINAL NEGATIVE: 1
CHEST TIGHTNESS: 0
ABDOMINAL DISTENTION: 0
WHEEZING: 0
EYES NEGATIVE: 1

## 2018-11-13 NOTE — PROGRESS NOTES
Subjective:      Patient ID: Alee Suarez is a 80 y.o. female. 18 Patient presents with:  Discuss Medications: Patient states shew stopped taking the Pro Fe she think it cause her to have diarrhea  and dizziness. Headache  Dizziness: off and on     No falls     Does suffer from Ch anxiety         Review of Systems   Constitutional: Negative for activity change, appetite change, fatigue, fever and unexpected weight change. Pneumonia vac  ; prevnar   Td ap    Flu vac 10/18  Zostavac     HENT: Negative. Partials   Eyes: Negative. Glasses ; Last eye ex 4/15 . S/P Bilat cataract surgery    Respiratory: Negative for cough, chest tightness, shortness of breath and wheezing. Does not smoke ; No Etoh     Cardiovascular: Negative. No HTN / CAD . No FH either      H/O Atrial Fib > on Coumadin     Gastrointestinal: Negative. Negative for abdominal distention and abdominal pain. Genitourinary: Negative for dysuria, frequency, menstrual problem, urgency and vaginal discharge. Hysterectromy   One daughter  in accident at 23      Mammogram 9/15     Musculoskeletal: Negative. DEXA  9/15  ; osteoporosis     Neurological: Positive for dizziness, tremors and headaches. Negative for weakness. Psychiatric/Behavioral: Positive for dysphoric mood. Negative for behavioral problems and sleep disturbance. The patient is nervous/anxious. Objective:   Physical Exam   Constitutional: She is oriented to person, place, and time. Eyes: EOM are normal.   Neck: Normal range of motion. Neck supple. Cardiovascular: Regular rhythm and normal heart sounds. Pulmonary/Chest: Breath sounds normal.   Abdominal: Soft. There is no tenderness. Musculoskeletal: Normal range of motion. Neurological: She is alert and oriented to person, place, and time. She has normal strength. She displays tremor. Skin: Skin is warm.    Psychiatric: Her

## 2018-11-14 LAB
ANISOCYTOSIS: ABNORMAL
BASOPHILS ABSOLUTE: 0 K/UL (ref 0–0.2)
BASOPHILS RELATIVE PERCENT: 1 %
EOSINOPHILS ABSOLUTE: 0 K/UL (ref 0–0.6)
EOSINOPHILS RELATIVE PERCENT: 0 %
HCT VFR BLD CALC: 33.2 % (ref 36–48)
HEMATOLOGY PATH CONSULT: NO
HEMOGLOBIN: 10.3 G/DL (ref 12–16)
LYMPHOCYTES ABSOLUTE: 1 K/UL (ref 1–5.1)
LYMPHOCYTES RELATIVE PERCENT: 21 %
MCH RBC QN AUTO: 21.4 PG (ref 26–34)
MCHC RBC AUTO-ENTMCNC: 30.9 G/DL (ref 31–36)
MCV RBC AUTO: 69.2 FL (ref 80–100)
MICROCYTES: ABNORMAL
MONOCYTES ABSOLUTE: 0.5 K/UL (ref 0–1.3)
MONOCYTES RELATIVE PERCENT: 11 %
NEUTROPHILS ABSOLUTE: 3.2 K/UL (ref 1.7–7.7)
NEUTROPHILS RELATIVE PERCENT: 67 %
PDW BLD-RTO: 22.1 % (ref 12.4–15.4)
PLATELET # BLD: 190 K/UL (ref 135–450)
PMV BLD AUTO: 8.7 FL (ref 5–10.5)
RBC # BLD: 4.79 M/UL (ref 4–5.2)
WBC # BLD: 4.8 K/UL (ref 4–11)

## 2018-12-04 ENCOUNTER — OFFICE VISIT (OUTPATIENT)
Dept: CARDIOLOGY CLINIC | Age: 83
End: 2018-12-04
Payer: MEDICARE

## 2018-12-04 VITALS
SYSTOLIC BLOOD PRESSURE: 117 MMHG | HEIGHT: 64 IN | HEART RATE: 71 BPM | WEIGHT: 111 LBS | DIASTOLIC BLOOD PRESSURE: 73 MMHG | BODY MASS INDEX: 18.95 KG/M2

## 2018-12-04 DIAGNOSIS — I48.19 PERSISTENT ATRIAL FIBRILLATION (HCC): Primary | ICD-10-CM

## 2018-12-04 DIAGNOSIS — I10 ESSENTIAL HYPERTENSION: ICD-10-CM

## 2018-12-04 DIAGNOSIS — I48.91 ATRIAL FIBRILLATION WITH RVR (HCC): ICD-10-CM

## 2018-12-04 DIAGNOSIS — R00.1 BRADYCARDIA: ICD-10-CM

## 2018-12-04 PROCEDURE — G8427 DOCREV CUR MEDS BY ELIG CLIN: HCPCS | Performed by: INTERNAL MEDICINE

## 2018-12-04 PROCEDURE — 1123F ACP DISCUSS/DSCN MKR DOCD: CPT | Performed by: INTERNAL MEDICINE

## 2018-12-04 PROCEDURE — 93000 ELECTROCARDIOGRAM COMPLETE: CPT | Performed by: INTERNAL MEDICINE

## 2018-12-04 PROCEDURE — 1101F PT FALLS ASSESS-DOCD LE1/YR: CPT | Performed by: INTERNAL MEDICINE

## 2018-12-04 PROCEDURE — 4040F PNEUMOC VAC/ADMIN/RCVD: CPT | Performed by: INTERNAL MEDICINE

## 2018-12-04 PROCEDURE — G8420 CALC BMI NORM PARAMETERS: HCPCS | Performed by: INTERNAL MEDICINE

## 2018-12-04 PROCEDURE — 1090F PRES/ABSN URINE INCON ASSESS: CPT | Performed by: INTERNAL MEDICINE

## 2018-12-04 PROCEDURE — G8482 FLU IMMUNIZE ORDER/ADMIN: HCPCS | Performed by: INTERNAL MEDICINE

## 2018-12-04 PROCEDURE — 1036F TOBACCO NON-USER: CPT | Performed by: INTERNAL MEDICINE

## 2018-12-04 PROCEDURE — 99214 OFFICE O/P EST MOD 30 MIN: CPT | Performed by: INTERNAL MEDICINE

## 2019-01-01 ENCOUNTER — TELEPHONE (OUTPATIENT)
Dept: PRIMARY CARE CLINIC | Age: 84
End: 2019-01-01

## 2019-01-01 ENCOUNTER — OFFICE VISIT (OUTPATIENT)
Dept: PRIMARY CARE CLINIC | Age: 84
End: 2019-01-01
Payer: MEDICARE

## 2019-01-01 ENCOUNTER — OFFICE VISIT (OUTPATIENT)
Dept: CARDIOLOGY CLINIC | Age: 84
End: 2019-01-01
Payer: MEDICARE

## 2019-01-01 ENCOUNTER — OFFICE VISIT (OUTPATIENT)
Dept: NEUROLOGY | Age: 84
End: 2019-01-01
Payer: MEDICARE

## 2019-01-01 ENCOUNTER — HOSPITAL ENCOUNTER (OUTPATIENT)
Age: 84
Discharge: HOME | End: 2019-06-20
Payer: MEDICARE

## 2019-01-01 ENCOUNTER — HOSPITAL ENCOUNTER (INPATIENT)
Age: 84
LOS: 6 days | Discharge: HOME | DRG: 813 | End: 2019-04-17
Attending: EMERGENCY MEDICINE | Admitting: INTERNAL MEDICINE
Payer: MEDICARE

## 2019-01-01 ENCOUNTER — TELEPHONE (OUTPATIENT)
Dept: CARDIOLOGY CLINIC | Age: 84
End: 2019-01-01

## 2019-01-01 ENCOUNTER — NURSE TRIAGE (OUTPATIENT)
Dept: OTHER | Facility: CLINIC | Age: 84
End: 2019-01-01

## 2019-01-01 ENCOUNTER — TELEPHONE (OUTPATIENT)
Dept: ADMINISTRATIVE | Age: 84
End: 2019-01-01

## 2019-01-01 ENCOUNTER — TELEPHONE (OUTPATIENT)
Dept: PHARMACY | Age: 84
End: 2019-01-01

## 2019-01-01 VITALS
HEART RATE: 66 BPM | TEMPERATURE: 97.3 F | DIASTOLIC BLOOD PRESSURE: 60 MMHG | BODY MASS INDEX: 19.57 KG/M2 | SYSTOLIC BLOOD PRESSURE: 110 MMHG | WEIGHT: 107 LBS

## 2019-01-01 VITALS
BODY MASS INDEX: 17.93 KG/M2 | SYSTOLIC BLOOD PRESSURE: 120 MMHG | DIASTOLIC BLOOD PRESSURE: 71 MMHG | HEART RATE: 54 BPM | HEIGHT: 64 IN | WEIGHT: 105 LBS

## 2019-01-01 VITALS
WEIGHT: 103.4 LBS | DIASTOLIC BLOOD PRESSURE: 54 MMHG | OXYGEN SATURATION: 98 % | SYSTOLIC BLOOD PRESSURE: 93 MMHG | BODY MASS INDEX: 19.03 KG/M2 | RESPIRATION RATE: 16 BRPM | TEMPERATURE: 97.2 F | HEIGHT: 62 IN | HEART RATE: 59 BPM

## 2019-01-01 VITALS
HEART RATE: 60 BPM | TEMPERATURE: 97.3 F | WEIGHT: 109 LBS | DIASTOLIC BLOOD PRESSURE: 66 MMHG | HEIGHT: 64 IN | OXYGEN SATURATION: 99 % | SYSTOLIC BLOOD PRESSURE: 114 MMHG | BODY MASS INDEX: 18.61 KG/M2 | RESPIRATION RATE: 12 BRPM

## 2019-01-01 VITALS
SYSTOLIC BLOOD PRESSURE: 128 MMHG | DIASTOLIC BLOOD PRESSURE: 62 MMHG | BODY MASS INDEX: 18.1 KG/M2 | WEIGHT: 106 LBS | HEIGHT: 64 IN | HEART RATE: 61 BPM | OXYGEN SATURATION: 98 %

## 2019-01-01 VITALS
SYSTOLIC BLOOD PRESSURE: 110 MMHG | BODY MASS INDEX: 18.78 KG/M2 | HEART RATE: 68 BPM | WEIGHT: 110 LBS | HEIGHT: 64 IN | DIASTOLIC BLOOD PRESSURE: 70 MMHG

## 2019-01-01 VITALS
BODY MASS INDEX: 18.78 KG/M2 | HEART RATE: 66 BPM | HEIGHT: 64 IN | DIASTOLIC BLOOD PRESSURE: 60 MMHG | WEIGHT: 110 LBS | SYSTOLIC BLOOD PRESSURE: 105 MMHG

## 2019-01-01 VITALS
DIASTOLIC BLOOD PRESSURE: 73 MMHG | WEIGHT: 111 LBS | SYSTOLIC BLOOD PRESSURE: 126 MMHG | OXYGEN SATURATION: 99 % | HEIGHT: 64 IN | HEART RATE: 58 BPM | BODY MASS INDEX: 18.95 KG/M2

## 2019-01-01 VITALS
HEART RATE: 78 BPM | HEIGHT: 64 IN | SYSTOLIC BLOOD PRESSURE: 135 MMHG | OXYGEN SATURATION: 97 % | BODY MASS INDEX: 18.44 KG/M2 | WEIGHT: 108 LBS | DIASTOLIC BLOOD PRESSURE: 73 MMHG | TEMPERATURE: 97.2 F

## 2019-01-01 VITALS
WEIGHT: 105.6 LBS | BODY MASS INDEX: 18.03 KG/M2 | HEIGHT: 64 IN | DIASTOLIC BLOOD PRESSURE: 76 MMHG | HEART RATE: 56 BPM | SYSTOLIC BLOOD PRESSURE: 120 MMHG | OXYGEN SATURATION: 98 %

## 2019-01-01 VITALS — SYSTOLIC BLOOD PRESSURE: 108 MMHG | DIASTOLIC BLOOD PRESSURE: 61 MMHG | HEART RATE: 52 BPM

## 2019-01-01 VITALS
HEIGHT: 64 IN | SYSTOLIC BLOOD PRESSURE: 100 MMHG | HEART RATE: 70 BPM | BODY MASS INDEX: 18.27 KG/M2 | DIASTOLIC BLOOD PRESSURE: 60 MMHG | WEIGHT: 107 LBS

## 2019-01-01 DIAGNOSIS — F41.1 GENERALIZED ANXIETY DISORDER: Primary | ICD-10-CM

## 2019-01-01 DIAGNOSIS — R79.1 ELEVATED INR: ICD-10-CM

## 2019-01-01 DIAGNOSIS — R10.84 GENERALIZED ABDOMINAL PAIN: ICD-10-CM

## 2019-01-01 DIAGNOSIS — I48.0 PAROXYSMAL ATRIAL FIBRILLATION (HCC): Primary | ICD-10-CM

## 2019-01-01 DIAGNOSIS — I48.19 PERSISTENT ATRIAL FIBRILLATION (HCC): ICD-10-CM

## 2019-01-01 DIAGNOSIS — R10.84 GENERALIZED ABDOMINAL PAIN: Primary | ICD-10-CM

## 2019-01-01 DIAGNOSIS — K92.2 GASTROINTESTINAL HEMORRHAGE, UNSPECIFIED GASTROINTESTINAL HEMORRHAGE TYPE: ICD-10-CM

## 2019-01-01 DIAGNOSIS — B37.31 VULVOVAGINAL CANDIDIASIS: ICD-10-CM

## 2019-01-01 DIAGNOSIS — R53.83 OTHER FATIGUE: ICD-10-CM

## 2019-01-01 DIAGNOSIS — I48.20 CHRONIC ATRIAL FIBRILLATION (HCC): ICD-10-CM

## 2019-01-01 DIAGNOSIS — R53.83 FATIGUE, UNSPECIFIED TYPE: ICD-10-CM

## 2019-01-01 DIAGNOSIS — I48.19 PERSISTENT ATRIAL FIBRILLATION (HCC): Primary | ICD-10-CM

## 2019-01-01 DIAGNOSIS — G20 PARKINSON DISEASE (HCC): ICD-10-CM

## 2019-01-01 DIAGNOSIS — I48.91 ATRIAL FIBRILLATION, UNSPECIFIED TYPE (HCC): ICD-10-CM

## 2019-01-01 DIAGNOSIS — E03.9 ACQUIRED HYPOTHYROIDISM: ICD-10-CM

## 2019-01-01 DIAGNOSIS — I48.91 ATRIAL FIBRILLATION, UNSPECIFIED TYPE (HCC): Primary | ICD-10-CM

## 2019-01-01 DIAGNOSIS — F41.9 ANXIETY AND DEPRESSION: ICD-10-CM

## 2019-01-01 DIAGNOSIS — M81.0 AGE-RELATED OSTEOPOROSIS WITHOUT CURRENT PATHOLOGICAL FRACTURE: ICD-10-CM

## 2019-01-01 DIAGNOSIS — R25.1 TREMOR: ICD-10-CM

## 2019-01-01 DIAGNOSIS — Z79.01 LONG TERM (CURRENT) USE OF ANTICOAGULANTS: Primary | ICD-10-CM

## 2019-01-01 DIAGNOSIS — D50.8 OTHER IRON DEFICIENCY ANEMIA: ICD-10-CM

## 2019-01-01 DIAGNOSIS — Z23 FLU VACCINE NEED: ICD-10-CM

## 2019-01-01 DIAGNOSIS — R35.0 FREQUENCY OF MICTURITION: ICD-10-CM

## 2019-01-01 DIAGNOSIS — G20 PARKINSON'S DISEASE (HCC): Primary | ICD-10-CM

## 2019-01-01 DIAGNOSIS — Z23 NEED FOR PNEUMOCOCCAL VACCINATION: ICD-10-CM

## 2019-01-01 DIAGNOSIS — I34.0 NON-RHEUMATIC MITRAL REGURGITATION: ICD-10-CM

## 2019-01-01 DIAGNOSIS — R25.1 TREMOR, UNSPECIFIED: ICD-10-CM

## 2019-01-01 DIAGNOSIS — R27.0 ATAXIA: ICD-10-CM

## 2019-01-01 DIAGNOSIS — I10 ESSENTIAL HYPERTENSION: ICD-10-CM

## 2019-01-01 DIAGNOSIS — K92.2 GASTROINTESTINAL HEMORRHAGE, UNSPECIFIED GASTROINTESTINAL HEMORRHAGE TYPE: Primary | ICD-10-CM

## 2019-01-01 DIAGNOSIS — Z09 HOSPITAL DISCHARGE FOLLOW-UP: ICD-10-CM

## 2019-01-01 DIAGNOSIS — Z79.01 LONG TERM (CURRENT) USE OF ANTICOAGULANTS: ICD-10-CM

## 2019-01-01 DIAGNOSIS — F32.A ANXIETY AND DEPRESSION: ICD-10-CM

## 2019-01-01 DIAGNOSIS — R21 RASH: ICD-10-CM

## 2019-01-01 DIAGNOSIS — Z79.01 ANTICOAGULATED: ICD-10-CM

## 2019-01-01 DIAGNOSIS — T45.515A WARFARIN-INDUCED COAGULOPATHY (HCC): ICD-10-CM

## 2019-01-01 DIAGNOSIS — D68.32 WARFARIN-INDUCED COAGULOPATHY (HCC): ICD-10-CM

## 2019-01-01 DIAGNOSIS — M15.9 GENERALIZED OA: ICD-10-CM

## 2019-01-01 DIAGNOSIS — R26.9 GAIT ABNORMALITY: ICD-10-CM

## 2019-01-01 LAB
A/G RATIO: 1.4 (ref 1.1–2.2)
A/G RATIO: 1.8 (ref 1.1–2.2)
ABO/RH: NORMAL
ALBUMIN SERPL-MCNC: 4.3 G/DL (ref 3.4–5)
ALBUMIN SERPL-MCNC: 4.4 G/DL (ref 3.4–5)
ALP BLD-CCNC: 51 U/L (ref 40–129)
ALP BLD-CCNC: 52 U/L (ref 40–129)
ALT SERPL-CCNC: 12 U/L (ref 10–40)
ALT SERPL-CCNC: 14 U/L (ref 10–40)
ANION GAP SERPL CALCULATED.3IONS-SCNC: 10 MMOL/L (ref 3–16)
ANION GAP SERPL CALCULATED.3IONS-SCNC: 12 MMOL/L (ref 3–16)
ANION GAP SERPL CALCULATED.3IONS-SCNC: 13 MMOL/L (ref 3–16)
ANION GAP SERPL CALCULATED.3IONS-SCNC: 13 MMOL/L (ref 3–16)
ANION GAP SERPL CALCULATED.3IONS-SCNC: 9 MMOL/L (ref 3–16)
ANISOCYTOSIS: ABNORMAL
ANTIBODY SCREEN: NORMAL
APTT: 61 SEC (ref 26–36)
AST SERPL-CCNC: 27 U/L (ref 15–37)
AST SERPL-CCNC: 28 U/L (ref 15–37)
ATYPICAL LYMPHOCYTE RELATIVE PERCENT: 1 % (ref 0–6)
BANDED NEUTROPHILS RELATIVE PERCENT: 1 % (ref 0–7)
BANDED NEUTROPHILS RELATIVE PERCENT: 1 % (ref 0–7)
BASOPHILS ABSOLUTE: 0 K/UL (ref 0–0.2)
BASOPHILS RELATIVE PERCENT: 0 %
BASOPHILS RELATIVE PERCENT: 1 %
BASOPHILS RELATIVE PERCENT: 1 %
BASOPHILS RELATIVE PERCENT: 1.3 %
BILIRUB SERPL-MCNC: 0.5 MG/DL (ref 0–1)
BILIRUB SERPL-MCNC: 0.5 MG/DL (ref 0–1)
BILIRUBIN URINE: NEGATIVE
BILIRUBIN URINE: NEGATIVE
BLOOD BANK DISPENSE STATUS: NORMAL
BLOOD BANK PRODUCT CODE: NORMAL
BLOOD, URINE: NEGATIVE
BLOOD, URINE: NEGATIVE
BPU ID: NORMAL
BUN BLDV-MCNC: 11 MG/DL (ref 7–20)
BUN BLDV-MCNC: 12 MG/DL (ref 7–20)
BUN BLDV-MCNC: 14 MG/DL (ref 7–20)
BUN BLDV-MCNC: 15 MG/DL (ref 7–20)
BUN BLDV-MCNC: 16 MG/DL (ref 7–20)
CALCIUM SERPL-MCNC: 8.5 MG/DL (ref 8.3–10.6)
CALCIUM SERPL-MCNC: 8.6 MG/DL (ref 8.3–10.6)
CALCIUM SERPL-MCNC: 8.8 MG/DL (ref 8.3–10.6)
CALCIUM SERPL-MCNC: 8.9 MG/DL (ref 8.3–10.6)
CALCIUM SERPL-MCNC: 8.9 MG/DL (ref 8.3–10.6)
CHLORIDE BLD-SCNC: 103 MMOL/L (ref 99–110)
CHLORIDE BLD-SCNC: 103 MMOL/L (ref 99–110)
CHLORIDE BLD-SCNC: 104 MMOL/L (ref 99–110)
CHLORIDE BLD-SCNC: 106 MMOL/L (ref 99–110)
CHLORIDE BLD-SCNC: 107 MMOL/L (ref 99–110)
CLARITY: CLEAR
CLARITY: CLEAR
CO2: 25 MMOL/L (ref 21–32)
CO2: 25 MMOL/L (ref 21–32)
CO2: 26 MMOL/L (ref 21–32)
CO2: 27 MMOL/L (ref 21–32)
CO2: 27 MMOL/L (ref 21–32)
COLOR: YELLOW
COLOR: YELLOW
CREAT SERPL-MCNC: 0.8 MG/DL (ref 0.6–1.2)
CREAT SERPL-MCNC: 0.9 MG/DL (ref 0.6–1.2)
CREAT SERPL-MCNC: 1 MG/DL (ref 0.6–1.2)
DESCRIPTION BLOOD BANK: NORMAL
EOSINOPHILS ABSOLUTE: 0 K/UL (ref 0–0.6)
EOSINOPHILS ABSOLUTE: 0.1 K/UL (ref 0–0.6)
EOSINOPHILS RELATIVE PERCENT: 0 %
EOSINOPHILS RELATIVE PERCENT: 2.4 %
FERRITIN: 11.7 NG/ML (ref 15–150)
FERRITIN: 30.6 NG/ML (ref 15–150)
FOLATE: 19.34 NG/ML (ref 4.78–24.2)
GFR AFRICAN AMERICAN: >60
GFR NON-AFRICAN AMERICAN: 53
GFR NON-AFRICAN AMERICAN: 59
GFR NON-AFRICAN AMERICAN: >60
GLOBULIN: 2.4 G/DL
GLOBULIN: 3 G/DL
GLUCOSE BLD-MCNC: 103 MG/DL (ref 70–99)
GLUCOSE BLD-MCNC: 111 MG/DL (ref 70–99)
GLUCOSE BLD-MCNC: 84 MG/DL (ref 70–99)
GLUCOSE BLD-MCNC: 88 MG/DL (ref 70–99)
GLUCOSE BLD-MCNC: 94 MG/DL (ref 70–99)
GLUCOSE URINE: NEGATIVE MG/DL
GLUCOSE URINE: NEGATIVE MG/DL
HCT VFR BLD CALC: 29 % (ref 36–48)
HCT VFR BLD CALC: 29.1 % (ref 36–48)
HCT VFR BLD CALC: 29.6 % (ref 36–48)
HCT VFR BLD CALC: 29.9 % (ref 36–48)
HCT VFR BLD CALC: 30.6 % (ref 36–48)
HCT VFR BLD CALC: 31.1 % (ref 36–48)
HCT VFR BLD CALC: 31.3 % (ref 36–48)
HCT VFR BLD CALC: 31.8 % (ref 36–48)
HCT VFR BLD CALC: 32 % (ref 36–48)
HCT VFR BLD CALC: 32.1 % (ref 36–48)
HCT VFR BLD CALC: 32.5 % (ref 36–48)
HCT VFR BLD CALC: 32.5 % (ref 36–48)
HCT VFR BLD CALC: 33.3 % (ref 36–48)
HCT VFR BLD CALC: 33.3 % (ref 36–48)
HCT VFR BLD CALC: 33.4 % (ref 36–48)
HCT VFR BLD CALC: 37.6 % (ref 36–48)
HEMATOLOGY PATH CONSULT: NO
HEMOGLOBIN: 10 G/DL (ref 12–16)
HEMOGLOBIN: 10.2 G/DL (ref 12–16)
HEMOGLOBIN: 10.2 G/DL (ref 12–16)
HEMOGLOBIN: 10.4 G/DL (ref 12–16)
HEMOGLOBIN: 12.2 G/DL (ref 12–16)
HEMOGLOBIN: 8.8 G/DL (ref 12–16)
HEMOGLOBIN: 9 G/DL (ref 12–16)
HEMOGLOBIN: 9 G/DL (ref 12–16)
HEMOGLOBIN: 9.1 G/DL (ref 12–16)
HEMOGLOBIN: 9.4 G/DL (ref 12–16)
HEMOGLOBIN: 9.5 G/DL (ref 12–16)
HEMOGLOBIN: 9.5 G/DL (ref 12–16)
HEMOGLOBIN: 9.6 G/DL (ref 12–16)
HEMOGLOBIN: 9.7 G/DL (ref 12–16)
HEMOGLOBIN: 9.8 G/DL (ref 12–16)
HEMOGLOBIN: 9.8 G/DL (ref 12–16)
HYPOCHROMIA: ABNORMAL
INR BLD: 1.47 (ref 0.86–1.14)
INR BLD: 1.48 (ref 0.86–1.14)
INR BLD: 1.49 (ref 0.86–1.14)
INR BLD: 1.72 (ref 0.86–1.14)
INR BLD: 1.78 (ref 0.86–1.14)
INR BLD: 1.96 (ref 0.86–1.14)
INR BLD: 2.04 (ref 0.86–1.14)
INR BLD: 2.27 (ref 0.86–1.14)
INR BLD: 2.4 (ref 0.86–1.14)
INR BLD: 2.46 (ref 0.86–1.14)
INR BLD: 2.52 (ref 0.86–1.14)
INR BLD: 2.73 (ref 0.86–1.14)
INR BLD: 2.9 (ref 0.86–1.14)
INR BLD: 2.96 (ref 0.86–1.14)
INR BLD: 3.15 (ref 0.86–1.14)
INR BLD: 3.38 (ref 0.86–1.14)
INR BLD: 3.41 (ref 0.86–1.14)
INR BLD: 3.61 (ref 0.86–1.14)
INR BLD: 3.76 (ref 0.86–1.14)
INR BLD: 3.77 (ref 0.86–1.14)
INR BLD: 4.17 (ref 0.86–1.14)
INR BLD: 4.37 (ref 0.86–1.14)
INR BLD: 4.39 (ref 0.86–1.14)
INR BLD: 4.75 (ref 0.86–1.14)
INR BLD: 4.88 (ref 0.86–1.14)
INR BLD: 6.18 (ref 0.86–1.14)
INR BLD: 6.26 (ref 0.86–1.14)
IRON SATURATION: 7 % (ref 15–50)
IRON SATURATION: 9 % (ref 15–50)
IRON: 35 UG/DL (ref 37–145)
IRON: 35 UG/DL (ref 37–145)
KETONES, URINE: NEGATIVE MG/DL
KETONES, URINE: NEGATIVE MG/DL
LEUKOCYTE ESTERASE, URINE: NEGATIVE
LEUKOCYTE ESTERASE, URINE: NEGATIVE
LIPASE: 43 U/L (ref 13–60)
LYMPHOCYTES ABSOLUTE: 0.6 K/UL (ref 1–5.1)
LYMPHOCYTES ABSOLUTE: 0.6 K/UL (ref 1–5.1)
LYMPHOCYTES ABSOLUTE: 0.8 K/UL (ref 1–5.1)
LYMPHOCYTES ABSOLUTE: 0.8 K/UL (ref 1–5.1)
LYMPHOCYTES ABSOLUTE: 0.9 K/UL (ref 1–5.1)
LYMPHOCYTES RELATIVE PERCENT: 12 %
LYMPHOCYTES RELATIVE PERCENT: 15 %
LYMPHOCYTES RELATIVE PERCENT: 16 %
LYMPHOCYTES RELATIVE PERCENT: 16 %
LYMPHOCYTES RELATIVE PERCENT: 18 %
LYMPHOCYTES RELATIVE PERCENT: 22 %
LYMPHOCYTES RELATIVE PERCENT: 24.8 %
MAGNESIUM: 2.1 MG/DL (ref 1.8–2.4)
MAGNESIUM: 2.4 MG/DL (ref 1.8–2.4)
MCH RBC QN AUTO: 20.9 PG (ref 26–34)
MCH RBC QN AUTO: 21.2 PG (ref 26–34)
MCH RBC QN AUTO: 21.3 PG (ref 26–34)
MCH RBC QN AUTO: 21.3 PG (ref 26–34)
MCH RBC QN AUTO: 23.1 PG (ref 26–34)
MCH RBC QN AUTO: 27.2 PG (ref 26–34)
MCHC RBC AUTO-ENTMCNC: 30 G/DL (ref 31–36)
MCHC RBC AUTO-ENTMCNC: 30.4 G/DL (ref 31–36)
MCHC RBC AUTO-ENTMCNC: 30.5 G/DL (ref 31–36)
MCHC RBC AUTO-ENTMCNC: 30.5 G/DL (ref 31–36)
MCHC RBC AUTO-ENTMCNC: 30.7 G/DL (ref 31–36)
MCHC RBC AUTO-ENTMCNC: 30.9 G/DL (ref 31–36)
MCHC RBC AUTO-ENTMCNC: 31.1 G/DL (ref 31–36)
MCHC RBC AUTO-ENTMCNC: 32.5 G/DL (ref 31–36)
MCV RBC AUTO: 68.6 FL (ref 80–100)
MCV RBC AUTO: 68.7 FL (ref 80–100)
MCV RBC AUTO: 68.9 FL (ref 80–100)
MCV RBC AUTO: 69.3 FL (ref 80–100)
MCV RBC AUTO: 69.6 FL (ref 80–100)
MCV RBC AUTO: 69.6 FL (ref 80–100)
MCV RBC AUTO: 74.3 FL (ref 80–100)
MCV RBC AUTO: 83.7 FL (ref 80–100)
MICROCYTES: ABNORMAL
MICROSCOPIC EXAMINATION: NORMAL
MICROSCOPIC EXAMINATION: NORMAL
MONOCYTES ABSOLUTE: 0.2 K/UL (ref 0–1.3)
MONOCYTES ABSOLUTE: 0.3 K/UL (ref 0–1.3)
MONOCYTES ABSOLUTE: 0.3 K/UL (ref 0–1.3)
MONOCYTES ABSOLUTE: 0.4 K/UL (ref 0–1.3)
MONOCYTES RELATIVE PERCENT: 10.1 %
MONOCYTES RELATIVE PERCENT: 6 %
MONOCYTES RELATIVE PERCENT: 7 %
MONOCYTES RELATIVE PERCENT: 9 %
MONOCYTES RELATIVE PERCENT: 9 %
NEUTROPHILS ABSOLUTE: 2.2 K/UL (ref 1.7–7.7)
NEUTROPHILS ABSOLUTE: 2.8 K/UL (ref 1.7–7.7)
NEUTROPHILS ABSOLUTE: 2.9 K/UL (ref 1.7–7.7)
NEUTROPHILS ABSOLUTE: 3 K/UL (ref 1.7–7.7)
NEUTROPHILS ABSOLUTE: 3.2 K/UL (ref 1.7–7.7)
NEUTROPHILS ABSOLUTE: 3.5 K/UL (ref 1.7–7.7)
NEUTROPHILS ABSOLUTE: 5.8 K/UL (ref 1.7–7.7)
NEUTROPHILS RELATIVE PERCENT: 61.4 %
NEUTROPHILS RELATIVE PERCENT: 71 %
NEUTROPHILS RELATIVE PERCENT: 74 %
NEUTROPHILS RELATIVE PERCENT: 75 %
NEUTROPHILS RELATIVE PERCENT: 75 %
NEUTROPHILS RELATIVE PERCENT: 76 %
NEUTROPHILS RELATIVE PERCENT: 82 %
NITRITE, URINE: NEGATIVE
NITRITE, URINE: NEGATIVE
OCCULT BLOOD DIAGNOSTIC: ABNORMAL
OVALOCYTES: ABNORMAL
PDW BLD-RTO: 15.7 % (ref 12.4–15.4)
PDW BLD-RTO: 20.5 % (ref 12.4–15.4)
PDW BLD-RTO: 20.6 % (ref 12.4–15.4)
PDW BLD-RTO: 20.6 % (ref 12.4–15.4)
PDW BLD-RTO: 20.7 % (ref 12.4–15.4)
PDW BLD-RTO: 20.7 % (ref 12.4–15.4)
PDW BLD-RTO: 21.2 % (ref 12.4–15.4)
PDW BLD-RTO: 28 % (ref 12.4–15.4)
PH UA: 6 (ref 5–8)
PH UA: 6.5 (ref 5–8)
PLATELET # BLD: 164 K/UL (ref 135–450)
PLATELET # BLD: 175 K/UL (ref 135–450)
PLATELET # BLD: 175 K/UL (ref 135–450)
PLATELET # BLD: 191 K/UL (ref 135–450)
PLATELET # BLD: 198 K/UL (ref 135–450)
PLATELET # BLD: 203 K/UL (ref 135–450)
PLATELET # BLD: 205 K/UL (ref 135–450)
PLATELET # BLD: 268 K/UL (ref 135–450)
PLATELET SLIDE REVIEW: ADEQUATE
PMV BLD AUTO: 8.2 FL (ref 5–10.5)
PMV BLD AUTO: 8.6 FL (ref 5–10.5)
PMV BLD AUTO: 8.7 FL (ref 5–10.5)
PMV BLD AUTO: 8.7 FL (ref 5–10.5)
PMV BLD AUTO: 8.8 FL (ref 5–10.5)
PMV BLD AUTO: 8.9 FL (ref 5–10.5)
POIKILOCYTES: ABNORMAL
POIKILOCYTES: ABNORMAL
POLYCHROMASIA: ABNORMAL
POLYCHROMASIA: ABNORMAL
POTASSIUM REFLEX MAGNESIUM: 4.3 MMOL/L (ref 3.5–5.1)
POTASSIUM SERPL-SCNC: 3.7 MMOL/L (ref 3.5–5.1)
POTASSIUM SERPL-SCNC: 4 MMOL/L (ref 3.5–5.1)
POTASSIUM SERPL-SCNC: 4 MMOL/L (ref 3.5–5.1)
POTASSIUM SERPL-SCNC: 4.7 MMOL/L (ref 3.5–5.1)
PROTEIN UA: NEGATIVE MG/DL
PROTEIN UA: NEGATIVE MG/DL
PROTHROMBIN TIME: 16.8 SEC (ref 9.8–13)
PROTHROMBIN TIME: 16.9 SEC (ref 9.8–13)
PROTHROMBIN TIME: 17 SEC (ref 9.8–13)
PROTHROMBIN TIME: 19.6 SEC (ref 9.8–13)
PROTHROMBIN TIME: 20.8 SEC (ref 10–13.2)
PROTHROMBIN TIME: 22.4 SEC (ref 9.8–13)
PROTHROMBIN TIME: 23.3 SEC (ref 9.8–13)
PROTHROMBIN TIME: 25.9 SEC (ref 9.8–13)
PROTHROMBIN TIME: 27.4 SEC (ref 9.8–13)
PROTHROMBIN TIME: 28.1 SEC (ref 9.8–13)
PROTHROMBIN TIME: 28.7 SEC (ref 9.8–13)
PROTHROMBIN TIME: 31.1 SEC (ref 9.8–13)
PROTHROMBIN TIME: 33.1 SEC (ref 9.8–13)
PROTHROMBIN TIME: 33.7 SEC (ref 9.8–13)
PROTHROMBIN TIME: 35.9 SEC (ref 9.8–13)
PROTHROMBIN TIME: 38.5 SEC (ref 9.8–13)
PROTHROMBIN TIME: 38.9 SEC (ref 9.8–13)
PROTHROMBIN TIME: 41.2 SEC (ref 9.8–13)
PROTHROMBIN TIME: 42.9 SEC (ref 9.8–13)
PROTHROMBIN TIME: 43 SEC (ref 9.8–13)
PROTHROMBIN TIME: 47.5 SEC (ref 9.8–13)
PROTHROMBIN TIME: 49.8 SEC (ref 9.8–13)
PROTHROMBIN TIME: 50.1 SEC (ref 9.8–13)
PROTHROMBIN TIME: 54.1 SEC (ref 9.8–13)
PROTHROMBIN TIME: 55.6 SEC (ref 9.8–13)
PROTHROMBIN TIME: 70.4 SEC (ref 9.8–13)
PROTHROMBIN TIME: 71.4 SEC (ref 9.8–13)
RBC # BLD: 4.22 M/UL (ref 4–5.2)
RBC # BLD: 4.42 M/UL (ref 4–5.2)
RBC # BLD: 4.49 M/UL (ref 4–5.2)
RBC # BLD: 4.5 M/UL (ref 4–5.2)
RBC # BLD: 4.67 M/UL (ref 4–5.2)
RBC # BLD: 4.73 M/UL (ref 4–5.2)
RBC # BLD: 4.79 M/UL (ref 4–5.2)
RBC # BLD: 4.86 M/UL (ref 4–5.2)
SLIDE REVIEW: ABNORMAL
SODIUM BLD-SCNC: 140 MMOL/L (ref 136–145)
SODIUM BLD-SCNC: 142 MMOL/L (ref 136–145)
SODIUM BLD-SCNC: 144 MMOL/L (ref 136–145)
SPECIFIC GRAVITY UA: 1.01 (ref 1–1.03)
SPECIFIC GRAVITY UA: 1.01 (ref 1–1.03)
TEAR DROP CELLS: ABNORMAL
TOTAL IRON BINDING CAPACITY: 383 UG/DL (ref 260–445)
TOTAL IRON BINDING CAPACITY: 479 UG/DL (ref 260–445)
TOTAL PROTEIN: 6.8 G/DL (ref 6.4–8.2)
TOTAL PROTEIN: 7.3 G/DL (ref 6.4–8.2)
TSH SERPL DL<=0.05 MIU/L-ACNC: 2.09 UIU/ML (ref 0.27–4.2)
URINE TYPE: NORMAL
URINE TYPE: NORMAL
UROBILINOGEN, URINE: 0.2 E.U./DL
UROBILINOGEN, URINE: 0.2 E.U./DL
VITAMIN B-12: 851 PG/ML (ref 211–911)
WBC # BLD: 3.6 K/UL (ref 4–11)
WBC # BLD: 3.9 K/UL (ref 4–11)
WBC # BLD: 4 K/UL (ref 4–11)
WBC # BLD: 4 K/UL (ref 4–11)
WBC # BLD: 4.3 K/UL (ref 4–11)
WBC # BLD: 4.5 K/UL (ref 4–11)
WBC # BLD: 5.2 K/UL (ref 4–11)
WBC # BLD: 7.1 K/UL (ref 4–11)

## 2019-01-01 PROCEDURE — G8482 FLU IMMUNIZE ORDER/ADMIN: HCPCS | Performed by: INTERNAL MEDICINE

## 2019-01-01 PROCEDURE — C9113 INJ PANTOPRAZOLE SODIUM, VIA: HCPCS | Performed by: FAMILY MEDICINE

## 2019-01-01 PROCEDURE — G8419 CALC BMI OUT NRM PARAM NOF/U: HCPCS | Performed by: NURSE PRACTITIONER

## 2019-01-01 PROCEDURE — G8427 DOCREV CUR MEDS BY ELIG CLIN: HCPCS | Performed by: NURSE PRACTITIONER

## 2019-01-01 PROCEDURE — 1036F TOBACCO NON-USER: CPT | Performed by: INTERNAL MEDICINE

## 2019-01-01 PROCEDURE — G8427 DOCREV CUR MEDS BY ELIG CLIN: HCPCS | Performed by: FAMILY MEDICINE

## 2019-01-01 PROCEDURE — 82728 ASSAY OF FERRITIN: CPT

## 2019-01-01 PROCEDURE — 1123F ACP DISCUSS/DSCN MKR DOCD: CPT | Performed by: INTERNAL MEDICINE

## 2019-01-01 PROCEDURE — 6370000000 HC RX 637 (ALT 250 FOR IP): Performed by: FAMILY MEDICINE

## 2019-01-01 PROCEDURE — 1090F PRES/ABSN URINE INCON ASSESS: CPT | Performed by: INTERNAL MEDICINE

## 2019-01-01 PROCEDURE — 97116 GAIT TRAINING THERAPY: CPT

## 2019-01-01 PROCEDURE — 85610 PROTHROMBIN TIME: CPT

## 2019-01-01 PROCEDURE — G8427 DOCREV CUR MEDS BY ELIG CLIN: HCPCS | Performed by: INTERNAL MEDICINE

## 2019-01-01 PROCEDURE — G0328 FECAL BLOOD SCRN IMMUNOASSAY: HCPCS

## 2019-01-01 PROCEDURE — 1036F TOBACCO NON-USER: CPT | Performed by: NURSE PRACTITIONER

## 2019-01-01 PROCEDURE — 97530 THERAPEUTIC ACTIVITIES: CPT

## 2019-01-01 PROCEDURE — 1123F ACP DISCUSS/DSCN MKR DOCD: CPT | Performed by: NURSE PRACTITIONER

## 2019-01-01 PROCEDURE — G8427 DOCREV CUR MEDS BY ELIG CLIN: HCPCS | Performed by: PSYCHIATRY & NEUROLOGY

## 2019-01-01 PROCEDURE — 36415 COLL VENOUS BLD VENIPUNCTURE: CPT

## 2019-01-01 PROCEDURE — 1200000000 HC SEMI PRIVATE

## 2019-01-01 PROCEDURE — 99214 OFFICE O/P EST MOD 30 MIN: CPT | Performed by: NURSE PRACTITIONER

## 2019-01-01 PROCEDURE — 4040F PNEUMOC VAC/ADMIN/RCVD: CPT | Performed by: INTERNAL MEDICINE

## 2019-01-01 PROCEDURE — 1111F DSCHRG MED/CURRENT MED MERGE: CPT | Performed by: INTERNAL MEDICINE

## 2019-01-01 PROCEDURE — 80048 BASIC METABOLIC PNL TOTAL CA: CPT

## 2019-01-01 PROCEDURE — 1090F PRES/ABSN URINE INCON ASSESS: CPT | Performed by: PSYCHIATRY & NEUROLOGY

## 2019-01-01 PROCEDURE — 86901 BLOOD TYPING SEROLOGIC RH(D): CPT

## 2019-01-01 PROCEDURE — 99214 OFFICE O/P EST MOD 30 MIN: CPT | Performed by: FAMILY MEDICINE

## 2019-01-01 PROCEDURE — 83735 ASSAY OF MAGNESIUM: CPT

## 2019-01-01 PROCEDURE — G8420 CALC BMI NORM PARAMETERS: HCPCS | Performed by: INTERNAL MEDICINE

## 2019-01-01 PROCEDURE — 99213 OFFICE O/P EST LOW 20 MIN: CPT | Performed by: INTERNAL MEDICINE

## 2019-01-01 PROCEDURE — 1090F PRES/ABSN URINE INCON ASSESS: CPT | Performed by: FAMILY MEDICINE

## 2019-01-01 PROCEDURE — 85018 HEMOGLOBIN: CPT

## 2019-01-01 PROCEDURE — 97165 OT EVAL LOW COMPLEX 30 MIN: CPT

## 2019-01-01 PROCEDURE — 99214 OFFICE O/P EST MOD 30 MIN: CPT | Performed by: INTERNAL MEDICINE

## 2019-01-01 PROCEDURE — 2580000003 HC RX 258: Performed by: INTERNAL MEDICINE

## 2019-01-01 PROCEDURE — 85730 THROMBOPLASTIN TIME PARTIAL: CPT

## 2019-01-01 PROCEDURE — 6370000000 HC RX 637 (ALT 250 FOR IP): Performed by: INTERNAL MEDICINE

## 2019-01-01 PROCEDURE — G0008 ADMIN INFLUENZA VIRUS VAC: HCPCS | Performed by: INTERNAL MEDICINE

## 2019-01-01 PROCEDURE — C9113 INJ PANTOPRAZOLE SODIUM, VIA: HCPCS | Performed by: INTERNAL MEDICINE

## 2019-01-01 PROCEDURE — 93000 ELECTROCARDIOGRAM COMPLETE: CPT | Performed by: NURSE PRACTITIONER

## 2019-01-01 PROCEDURE — 90653 IIV ADJUVANT VACCINE IM: CPT | Performed by: INTERNAL MEDICINE

## 2019-01-01 PROCEDURE — 6360000002 HC RX W HCPCS: Performed by: FAMILY MEDICINE

## 2019-01-01 PROCEDURE — 6360000002 HC RX W HCPCS: Performed by: EMERGENCY MEDICINE

## 2019-01-01 PROCEDURE — 99205 OFFICE O/P NEW HI 60 MIN: CPT | Performed by: PSYCHIATRY & NEUROLOGY

## 2019-01-01 PROCEDURE — 1036F TOBACCO NON-USER: CPT | Performed by: FAMILY MEDICINE

## 2019-01-01 PROCEDURE — 6360000002 HC RX W HCPCS: Performed by: PHYSICIAN ASSISTANT

## 2019-01-01 PROCEDURE — 36430 TRANSFUSION BLD/BLD COMPNT: CPT

## 2019-01-01 PROCEDURE — 85027 COMPLETE CBC AUTOMATED: CPT

## 2019-01-01 PROCEDURE — 80053 COMPREHEN METABOLIC PANEL: CPT

## 2019-01-01 PROCEDURE — 85025 COMPLETE CBC W/AUTO DIFF WBC: CPT

## 2019-01-01 PROCEDURE — 4040F PNEUMOC VAC/ADMIN/RCVD: CPT | Performed by: NURSE PRACTITIONER

## 2019-01-01 PROCEDURE — 2580000003 HC RX 258: Performed by: PHYSICIAN ASSISTANT

## 2019-01-01 PROCEDURE — G8482 FLU IMMUNIZE ORDER/ADMIN: HCPCS | Performed by: PSYCHIATRY & NEUROLOGY

## 2019-01-01 PROCEDURE — 1090F PRES/ABSN URINE INCON ASSESS: CPT | Performed by: NURSE PRACTITIONER

## 2019-01-01 PROCEDURE — 97535 SELF CARE MNGMENT TRAINING: CPT

## 2019-01-01 PROCEDURE — 96374 THER/PROPH/DIAG INJ IV PUSH: CPT

## 2019-01-01 PROCEDURE — 85014 HEMATOCRIT: CPT

## 2019-01-01 PROCEDURE — G8419 CALC BMI OUT NRM PARAM NOF/U: HCPCS | Performed by: PSYCHIATRY & NEUROLOGY

## 2019-01-01 PROCEDURE — 6370000000 HC RX 637 (ALT 250 FOR IP): Performed by: NURSE PRACTITIONER

## 2019-01-01 PROCEDURE — 83550 IRON BINDING TEST: CPT

## 2019-01-01 PROCEDURE — G0009 ADMIN PNEUMOCOCCAL VACCINE: HCPCS | Performed by: INTERNAL MEDICINE

## 2019-01-01 PROCEDURE — 1101F PT FALLS ASSESS-DOCD LE1/YR: CPT | Performed by: INTERNAL MEDICINE

## 2019-01-01 PROCEDURE — 97162 PT EVAL MOD COMPLEX 30 MIN: CPT

## 2019-01-01 PROCEDURE — 4040F PNEUMOC VAC/ADMIN/RCVD: CPT | Performed by: FAMILY MEDICINE

## 2019-01-01 PROCEDURE — G8482 FLU IMMUNIZE ORDER/ADMIN: HCPCS | Performed by: NURSE PRACTITIONER

## 2019-01-01 PROCEDURE — 97110 THERAPEUTIC EXERCISES: CPT

## 2019-01-01 PROCEDURE — 86900 BLOOD TYPING SEROLOGIC ABO: CPT

## 2019-01-01 PROCEDURE — C9113 INJ PANTOPRAZOLE SODIUM, VIA: HCPCS | Performed by: EMERGENCY MEDICINE

## 2019-01-01 PROCEDURE — 6360000002 HC RX W HCPCS: Performed by: INTERNAL MEDICINE

## 2019-01-01 PROCEDURE — G8482 FLU IMMUNIZE ORDER/ADMIN: HCPCS | Performed by: FAMILY MEDICINE

## 2019-01-01 PROCEDURE — 86850 RBC ANTIBODY SCREEN: CPT

## 2019-01-01 PROCEDURE — 1123F ACP DISCUSS/DSCN MKR DOCD: CPT | Performed by: FAMILY MEDICINE

## 2019-01-01 PROCEDURE — 1036F TOBACCO NON-USER: CPT | Performed by: PSYCHIATRY & NEUROLOGY

## 2019-01-01 PROCEDURE — 4040F PNEUMOC VAC/ADMIN/RCVD: CPT | Performed by: PSYCHIATRY & NEUROLOGY

## 2019-01-01 PROCEDURE — 90732 PPSV23 VACC 2 YRS+ SUBQ/IM: CPT | Performed by: INTERNAL MEDICINE

## 2019-01-01 PROCEDURE — 96372 THER/PROPH/DIAG INJ SC/IM: CPT

## 2019-01-01 PROCEDURE — G8420 CALC BMI NORM PARAMETERS: HCPCS | Performed by: FAMILY MEDICINE

## 2019-01-01 PROCEDURE — P9017 PLASMA 1 DONOR FRZ W/IN 8 HR: HCPCS

## 2019-01-01 PROCEDURE — 1123F ACP DISCUSS/DSCN MKR DOCD: CPT | Performed by: PSYCHIATRY & NEUROLOGY

## 2019-01-01 PROCEDURE — G8419 CALC BMI OUT NRM PARAM NOF/U: HCPCS | Performed by: INTERNAL MEDICINE

## 2019-01-01 PROCEDURE — 83540 ASSAY OF IRON: CPT

## 2019-01-01 PROCEDURE — 1101F PT FALLS ASSESS-DOCD LE1/YR: CPT | Performed by: FAMILY MEDICINE

## 2019-01-01 PROCEDURE — 2580000003 HC RX 258: Performed by: EMERGENCY MEDICINE

## 2019-01-01 PROCEDURE — 99284 EMERGENCY DEPT VISIT MOD MDM: CPT

## 2019-01-01 RX ORDER — POTASSIUM CHLORIDE 20 MEQ/1
40 TABLET, EXTENDED RELEASE ORAL PRN
Status: DISCONTINUED | OUTPATIENT
Start: 2019-01-01 | End: 2019-01-01 | Stop reason: HOSPADM

## 2019-01-01 RX ORDER — SERTRALINE HYDROCHLORIDE 25 MG/1
25 TABLET, FILM COATED ORAL DAILY
Qty: 30 TABLET | Refills: 5 | Status: SHIPPED | OUTPATIENT
Start: 2019-01-01 | End: 2019-01-01

## 2019-01-01 RX ORDER — WARFARIN SODIUM 2 MG/1
2 TABLET ORAL DAILY
Qty: 90 TABLET | Refills: 5 | Status: SHIPPED | OUTPATIENT
Start: 2019-01-01 | End: 2020-01-01 | Stop reason: ALTCHOICE

## 2019-01-01 RX ORDER — WARFARIN SODIUM 1 MG/1
1 TABLET ORAL DAILY
Qty: 90 TABLET | Refills: 3 | Status: SHIPPED | OUTPATIENT
Start: 2019-01-01 | End: 2020-01-01 | Stop reason: ALTCHOICE

## 2019-01-01 RX ORDER — ACETAMINOPHEN 500 MG
1000 TABLET ORAL EVERY 6 HOURS PRN
Qty: 120 TABLET | Refills: 3 | Status: SHIPPED | OUTPATIENT
Start: 2019-01-01

## 2019-01-01 RX ORDER — POLYVINYL ALCOHOL 14 MG/ML
1 SOLUTION/ DROPS OPHTHALMIC EVERY 6 HOURS PRN
Status: DISCONTINUED | OUTPATIENT
Start: 2019-01-01 | End: 2019-01-01 | Stop reason: HOSPADM

## 2019-01-01 RX ORDER — PANTOPRAZOLE SODIUM 40 MG/1
40 TABLET, DELAYED RELEASE ORAL DAILY PRN
Qty: 90 TABLET | Refills: 3 | Status: SHIPPED | OUTPATIENT
Start: 2019-01-01 | End: 2020-01-01 | Stop reason: SDUPTHER

## 2019-01-01 RX ORDER — WARFARIN SODIUM 1 MG/1
1 TABLET ORAL DAILY
Qty: 90 TABLET | Refills: 3 | Status: SHIPPED | OUTPATIENT
Start: 2019-01-01 | End: 2019-01-01 | Stop reason: SDUPTHER

## 2019-01-01 RX ORDER — SERTRALINE HYDROCHLORIDE 25 MG/1
25 TABLET, FILM COATED ORAL DAILY
Qty: 30 TABLET | Refills: 3 | Status: SHIPPED | OUTPATIENT
Start: 2019-01-01 | End: 2020-01-01

## 2019-01-01 RX ORDER — POTASSIUM CHLORIDE 7.45 MG/ML
10 INJECTION INTRAVENOUS PRN
Status: DISCONTINUED | OUTPATIENT
Start: 2019-01-01 | End: 2019-01-01 | Stop reason: HOSPADM

## 2019-01-01 RX ORDER — WARFARIN SODIUM 2.5 MG/1
TABLET ORAL
Qty: 90 TABLET | Refills: 3 | Status: SHIPPED | OUTPATIENT
Start: 2019-01-01 | End: 2019-01-01

## 2019-01-01 RX ORDER — LEVOTHYROXINE SODIUM 0.1 MG/1
100 TABLET ORAL DAILY
Qty: 90 TABLET | Refills: 3 | Status: SHIPPED | OUTPATIENT
Start: 2019-01-01

## 2019-01-01 RX ORDER — PANTOPRAZOLE SODIUM 40 MG/1
40 TABLET, DELAYED RELEASE ORAL
Status: DISCONTINUED | OUTPATIENT
Start: 2019-01-01 | End: 2019-01-01 | Stop reason: HOSPADM

## 2019-01-01 RX ORDER — WARFARIN SODIUM 2 MG/1
2 TABLET ORAL DAILY
Qty: 90 TABLET | Refills: 5 | Status: SHIPPED | OUTPATIENT
Start: 2019-01-01 | End: 2019-01-01 | Stop reason: SDUPTHER

## 2019-01-01 RX ORDER — CARBIDOPA AND LEVODOPA 25; 100 MG/1; MG/1
1 TABLET, EXTENDED RELEASE ORAL 2 TIMES DAILY
Qty: 60 TABLET | Refills: 2 | Status: SHIPPED | OUTPATIENT
Start: 2019-01-01

## 2019-01-01 RX ORDER — SODIUM CHLORIDE 0.9 % (FLUSH) 0.9 %
10 SYRINGE (ML) INJECTION EVERY 12 HOURS SCHEDULED
Status: DISCONTINUED | OUTPATIENT
Start: 2019-01-01 | End: 2019-01-01

## 2019-01-01 RX ORDER — LEVOTHYROXINE SODIUM 0.1 MG/1
TABLET ORAL
Qty: 90 TABLET | Refills: 3 | Status: SHIPPED | OUTPATIENT
Start: 2019-01-01 | End: 2019-01-01 | Stop reason: SDUPTHER

## 2019-01-01 RX ORDER — LEVOTHYROXINE SODIUM 0.1 MG/1
100 TABLET ORAL
Status: DISCONTINUED | OUTPATIENT
Start: 2019-01-01 | End: 2019-01-01 | Stop reason: HOSPADM

## 2019-01-01 RX ORDER — ONDANSETRON 2 MG/ML
4 INJECTION INTRAMUSCULAR; INTRAVENOUS EVERY 6 HOURS PRN
Status: DISCONTINUED | OUTPATIENT
Start: 2019-01-01 | End: 2019-01-01 | Stop reason: HOSPADM

## 2019-01-01 RX ORDER — LANOLIN ALCOHOL/MO/W.PET/CERES
3 CREAM (GRAM) TOPICAL NIGHTLY PRN
Status: DISCONTINUED | OUTPATIENT
Start: 2019-01-01 | End: 2019-01-01 | Stop reason: HOSPADM

## 2019-01-01 RX ORDER — OYSTER SHELL CALCIUM WITH VITAMIN D 500; 200 MG/1; [IU]/1
2 TABLET, FILM COATED ORAL DAILY
Status: DISCONTINUED | OUTPATIENT
Start: 2019-01-01 | End: 2019-01-01 | Stop reason: HOSPADM

## 2019-01-01 RX ORDER — WARFARIN SODIUM 5 MG/1
5 TABLET ORAL DAILY
Status: DISCONTINUED | OUTPATIENT
Start: 2019-01-01 | End: 2019-01-01

## 2019-01-01 RX ORDER — PANTOPRAZOLE SODIUM 40 MG/10ML
40 INJECTION, POWDER, LYOPHILIZED, FOR SOLUTION INTRAVENOUS 2 TIMES DAILY
Status: DISCONTINUED | OUTPATIENT
Start: 2019-01-01 | End: 2019-01-01

## 2019-01-01 RX ORDER — PANTOPRAZOLE SODIUM 40 MG/1
TABLET, DELAYED RELEASE ORAL
Qty: 90 TABLET | Refills: 3 | Status: SHIPPED | OUTPATIENT
Start: 2019-01-01 | End: 2019-01-01 | Stop reason: SDUPTHER

## 2019-01-01 RX ORDER — SODIUM CHLORIDE 0.9 % (FLUSH) 0.9 %
10 SYRINGE (ML) INJECTION PRN
Status: DISCONTINUED | OUTPATIENT
Start: 2019-01-01 | End: 2019-01-01

## 2019-01-01 RX ORDER — ACETAMINOPHEN 500 MG
1000 TABLET ORAL EVERY 6 HOURS PRN
Status: DISCONTINUED | OUTPATIENT
Start: 2019-01-01 | End: 2019-01-01 | Stop reason: HOSPADM

## 2019-01-01 RX ORDER — FLUCONAZOLE 150 MG/1
150 TABLET ORAL ONCE
Qty: 1 TABLET | Refills: 0 | Status: SHIPPED | OUTPATIENT
Start: 2019-01-01 | End: 2019-01-01

## 2019-01-01 RX ORDER — WARFARIN SODIUM 7.5 MG/1
7.5 TABLET ORAL
Status: COMPLETED | OUTPATIENT
Start: 2019-01-01 | End: 2019-01-01

## 2019-01-01 RX ORDER — LEVOTHYROXINE SODIUM 0.1 MG/1
1 TABLET ORAL DAILY
Status: DISCONTINUED | OUTPATIENT
Start: 2019-01-01 | End: 2019-01-01 | Stop reason: SDUPTHER

## 2019-01-01 RX ORDER — PHYTONADIONE 10 MG/ML
10 INJECTION, EMULSION INTRAMUSCULAR; INTRAVENOUS; SUBCUTANEOUS ONCE
Status: COMPLETED | OUTPATIENT
Start: 2019-01-01 | End: 2019-01-01

## 2019-01-01 RX ORDER — 0.9 % SODIUM CHLORIDE 0.9 %
250 INTRAVENOUS SOLUTION INTRAVENOUS ONCE
Status: DISCONTINUED | OUTPATIENT
Start: 2019-01-01 | End: 2019-01-01 | Stop reason: HOSPADM

## 2019-01-01 RX ORDER — PANTOPRAZOLE SODIUM 40 MG/10ML
80 INJECTION, POWDER, LYOPHILIZED, FOR SOLUTION INTRAVENOUS ONCE
Status: COMPLETED | OUTPATIENT
Start: 2019-01-01 | End: 2019-01-01

## 2019-01-01 RX ORDER — SODIUM CHLORIDE 9 MG/ML
INJECTION, SOLUTION INTRAVENOUS CONTINUOUS
Status: DISCONTINUED | OUTPATIENT
Start: 2019-01-01 | End: 2019-01-01

## 2019-01-01 RX ORDER — PANTOPRAZOLE SODIUM 40 MG/10ML
40 INJECTION, POWDER, LYOPHILIZED, FOR SOLUTION INTRAVENOUS DAILY
Status: DISCONTINUED | OUTPATIENT
Start: 2019-01-01 | End: 2019-01-01

## 2019-01-01 RX ORDER — 0.9 % SODIUM CHLORIDE 0.9 %
250 INTRAVENOUS SOLUTION INTRAVENOUS ONCE
Status: COMPLETED | OUTPATIENT
Start: 2019-01-01 | End: 2019-01-01

## 2019-01-01 RX ADMIN — IRON SUCROSE 200 MG: 20 INJECTION, SOLUTION INTRAVENOUS at 17:29

## 2019-01-01 RX ADMIN — Medication 10 ML: at 20:16

## 2019-01-01 RX ADMIN — IRON SUCROSE 200 MG: 20 INJECTION, SOLUTION INTRAVENOUS at 17:13

## 2019-01-01 RX ADMIN — PANTOPRAZOLE SODIUM 40 MG: 40 INJECTION, POWDER, FOR SOLUTION INTRAVENOUS at 12:25

## 2019-01-01 RX ADMIN — SODIUM CHLORIDE 8 MG/HR: 9 INJECTION, SOLUTION INTRAVENOUS at 03:04

## 2019-01-01 RX ADMIN — PANTOPRAZOLE SODIUM 40 MG: 40 INJECTION, POWDER, FOR SOLUTION INTRAVENOUS at 20:16

## 2019-01-01 RX ADMIN — LEVOTHYROXINE SODIUM 100 MCG: 100 TABLET ORAL at 06:05

## 2019-01-01 RX ADMIN — MELATONIN TAB 3 MG 3 MG: 3 TAB at 23:53

## 2019-01-01 RX ADMIN — WARFARIN SODIUM 5 MG: 5 TABLET ORAL at 17:28

## 2019-01-01 RX ADMIN — WARFARIN SODIUM 7.5 MG: 7.5 TABLET ORAL at 17:12

## 2019-01-01 RX ADMIN — Medication 10 ML: at 09:36

## 2019-01-01 RX ADMIN — PANTOPRAZOLE SODIUM 40 MG: 40 INJECTION, POWDER, FOR SOLUTION INTRAVENOUS at 09:36

## 2019-01-01 RX ADMIN — SODIUM CHLORIDE 8 MG/HR: 9 INJECTION, SOLUTION INTRAVENOUS at 09:46

## 2019-01-01 RX ADMIN — PANTOPRAZOLE SODIUM 40 MG: 40 TABLET, DELAYED RELEASE ORAL at 06:01

## 2019-01-01 RX ADMIN — SODIUM CHLORIDE 8 MG/HR: 9 INJECTION, SOLUTION INTRAVENOUS at 21:01

## 2019-01-01 RX ADMIN — Medication 10 ML: at 19:58

## 2019-01-01 RX ADMIN — SODIUM CHLORIDE 250 ML: 9 INJECTION, SOLUTION INTRAVENOUS at 21:01

## 2019-01-01 RX ADMIN — SODIUM CHLORIDE: 9 INJECTION, SOLUTION INTRAVENOUS at 21:01

## 2019-01-01 RX ADMIN — Medication 10 ML: at 12:25

## 2019-01-01 RX ADMIN — LEVOTHYROXINE SODIUM 100 MCG: 100 TABLET ORAL at 05:22

## 2019-01-01 RX ADMIN — PANTOPRAZOLE SODIUM 40 MG: 40 INJECTION, POWDER, FOR SOLUTION INTRAVENOUS at 19:58

## 2019-01-01 RX ADMIN — PHYTONADIONE 10 MG: 10 INJECTION, EMULSION INTRAMUSCULAR; INTRAVENOUS; SUBCUTANEOUS at 16:02

## 2019-01-01 RX ADMIN — LEVOTHYROXINE SODIUM 100 MCG: 100 TABLET ORAL at 06:09

## 2019-01-01 RX ADMIN — LEVOTHYROXINE SODIUM 100 MCG: 100 TABLET ORAL at 06:01

## 2019-01-01 RX ADMIN — WARFARIN SODIUM 5 MG: 5 TABLET ORAL at 17:52

## 2019-01-01 RX ADMIN — Medication 10 ML: at 09:51

## 2019-01-01 RX ADMIN — Medication 10 ML: at 21:01

## 2019-01-01 RX ADMIN — PANTOPRAZOLE SODIUM 80 MG: 40 INJECTION, POWDER, FOR SOLUTION INTRAVENOUS at 15:51

## 2019-01-01 RX ADMIN — LEVOTHYROXINE SODIUM 100 MCG: 100 TABLET ORAL at 05:57

## 2019-01-01 RX ADMIN — LEVOTHYROXINE SODIUM 100 MCG: 100 TABLET ORAL at 06:40

## 2019-01-01 RX ADMIN — SODIUM CHLORIDE 8 MG/HR: 9 INJECTION, SOLUTION INTRAVENOUS at 15:56

## 2019-01-01 RX ADMIN — SODIUM CHLORIDE: 9 INJECTION, SOLUTION INTRAVENOUS at 01:15

## 2019-01-01 RX ADMIN — IRON SUCROSE 200 MG: 20 INJECTION, SOLUTION INTRAVENOUS at 17:00

## 2019-01-01 RX ADMIN — PANTOPRAZOLE SODIUM 40 MG: 40 TABLET, DELAYED RELEASE ORAL at 06:09

## 2019-01-01 ASSESSMENT — ENCOUNTER SYMPTOMS
TROUBLE SWALLOWING: 0
EYE REDNESS: 0
ABDOMINAL DISTENTION: 0
CONSTIPATION: 0
GASTROINTESTINAL NEGATIVE: 1
WHEEZING: 0
ABDOMINAL PAIN: 0
ABDOMINAL DISTENTION: 0
CONSTIPATION: 0
ABDOMINAL DISTENTION: 0
DIARRHEA: 0
SHORTNESS OF BREATH: 0
EYES NEGATIVE: 1
WHEEZING: 0
SHORTNESS OF BREATH: 0
GASTROINTESTINAL NEGATIVE: 1
BLOOD IN STOOL: 0
VOMITING: 0
APNEA: 0
CHEST TIGHTNESS: 0
CHEST TIGHTNESS: 0
BACK PAIN: 0
EYES NEGATIVE: 1
ABDOMINAL DISTENTION: 0
CONSTIPATION: 0
COUGH: 0
RECTAL PAIN: 0
COUGH: 0
COUGH: 0
CONSTIPATION: 0
CONSTIPATION: 0
WHEEZING: 0
WHEEZING: 0
CHEST TIGHTNESS: 0
CHOKING: 0
NAUSEA: 0
DIARRHEA: 0
SHORTNESS OF BREATH: 0
SHORTNESS OF BREATH: 0
EYES NEGATIVE: 1
CONSTIPATION: 0
PHOTOPHOBIA: 0
BLOOD IN STOOL: 0
GASTROINTESTINAL NEGATIVE: 1
ABDOMINAL DISTENTION: 0
COUGH: 0
BLOOD IN STOOL: 0
WHEEZING: 0
CONSTIPATION: 0
DIARRHEA: 0
BLOOD IN STOOL: 0
DIARRHEA: 0
SORE THROAT: 0
COUGH: 0
WHEEZING: 0
GASTROINTESTINAL NEGATIVE: 1
SHORTNESS OF BREATH: 0
GASTROINTESTINAL NEGATIVE: 1
WHEEZING: 0
ABDOMINAL PAIN: 0
COUGH: 0
ABDOMINAL PAIN: 0
COUGH: 0
ABDOMINAL DISTENTION: 0
CHEST TIGHTNESS: 0
BLOOD IN STOOL: 0
ABDOMINAL PAIN: 0
EYES NEGATIVE: 1
CONSTIPATION: 0
GASTROINTESTINAL NEGATIVE: 1
VOMITING: 0
NAUSEA: 0
RHINORRHEA: 0
COLOR CHANGE: 0
EYE DISCHARGE: 0
SHORTNESS OF BREATH: 0
EYES NEGATIVE: 1
COUGH: 0
EYE ITCHING: 0
SHORTNESS OF BREATH: 0
CHEST TIGHTNESS: 0
ABDOMINAL PAIN: 0
EYES NEGATIVE: 1
SINUS PRESSURE: 0
CHEST TIGHTNESS: 0
SHORTNESS OF BREATH: 0
DIARRHEA: 0
DIARRHEA: 0
STRIDOR: 0
DIARRHEA: 0
CHEST TIGHTNESS: 0
BLOOD IN STOOL: 0
DIARRHEA: 0
ABDOMINAL PAIN: 0
BLOOD IN STOOL: 0
ABDOMINAL DISTENTION: 0
EYE PAIN: 0
ABDOMINAL PAIN: 0

## 2019-01-01 ASSESSMENT — PATIENT HEALTH QUESTIONNAIRE - PHQ9
1. LITTLE INTEREST OR PLEASURE IN DOING THINGS: 0
SUM OF ALL RESPONSES TO PHQ QUESTIONS 1-9: 0
2. FEELING DOWN, DEPRESSED OR HOPELESS: 0
SUM OF ALL RESPONSES TO PHQ QUESTIONS 1-9: 0
SUM OF ALL RESPONSES TO PHQ9 QUESTIONS 1 & 2: 0

## 2019-01-01 ASSESSMENT — PAIN SCALES - GENERAL
PAINLEVEL_OUTOF10: 0
PAINLEVEL_OUTOF10: 5
PAINLEVEL_OUTOF10: 0
PAINLEVEL_OUTOF10: 6
PAINLEVEL_OUTOF10: 0

## 2019-01-01 ASSESSMENT — PAIN DESCRIPTION - ORIENTATION: ORIENTATION: LOWER;MID

## 2019-01-01 ASSESSMENT — PAIN DESCRIPTION - LOCATION: LOCATION: ABDOMEN

## 2019-01-01 ASSESSMENT — PAIN DESCRIPTION - PAIN TYPE: TYPE: ACUTE PAIN

## 2019-01-17 ENCOUNTER — OFFICE VISIT (OUTPATIENT)
Dept: PRIMARY CARE CLINIC | Age: 84
End: 2019-01-17
Payer: MEDICARE

## 2019-01-17 VITALS
HEIGHT: 64 IN | HEART RATE: 60 BPM | TEMPERATURE: 97.2 F | SYSTOLIC BLOOD PRESSURE: 130 MMHG | WEIGHT: 111 LBS | DIASTOLIC BLOOD PRESSURE: 60 MMHG | BODY MASS INDEX: 18.95 KG/M2 | OXYGEN SATURATION: 96 %

## 2019-01-17 DIAGNOSIS — Z00.00 PHYSICAL EXAM: ICD-10-CM

## 2019-01-17 DIAGNOSIS — D50.0 IRON DEFICIENCY ANEMIA DUE TO CHRONIC BLOOD LOSS: ICD-10-CM

## 2019-01-17 DIAGNOSIS — I48.19 PERSISTENT ATRIAL FIBRILLATION (HCC): ICD-10-CM

## 2019-01-17 DIAGNOSIS — E03.9 ACQUIRED HYPOTHYROIDISM: ICD-10-CM

## 2019-01-17 LAB
A/G RATIO: 2.3 (ref 1.1–2.2)
ALBUMIN SERPL-MCNC: 4.8 G/DL (ref 3.4–5)
ALP BLD-CCNC: 63 U/L (ref 40–129)
ALT SERPL-CCNC: 17 U/L (ref 10–40)
ANION GAP SERPL CALCULATED.3IONS-SCNC: 15 MMOL/L (ref 3–16)
ANISOCYTOSIS: ABNORMAL
AST SERPL-CCNC: 27 U/L (ref 15–37)
BASOPHILS ABSOLUTE: 0 K/UL (ref 0–0.2)
BASOPHILS RELATIVE PERCENT: 1 %
BILIRUB SERPL-MCNC: 0.5 MG/DL (ref 0–1)
BILIRUBIN URINE: NEGATIVE
BLOOD, URINE: NEGATIVE
BUN BLDV-MCNC: 21 MG/DL (ref 7–20)
CALCIUM SERPL-MCNC: 9.4 MG/DL (ref 8.3–10.6)
CHLORIDE BLD-SCNC: 101 MMOL/L (ref 99–110)
CHOLESTEROL, TOTAL: 169 MG/DL (ref 0–199)
CLARITY: CLEAR
CO2: 27 MMOL/L (ref 21–32)
COLOR: YELLOW
CREAT SERPL-MCNC: 0.9 MG/DL (ref 0.6–1.2)
EOSINOPHILS ABSOLUTE: 0 K/UL (ref 0–0.6)
EOSINOPHILS RELATIVE PERCENT: 0 %
GFR AFRICAN AMERICAN: >60
GFR NON-AFRICAN AMERICAN: 59
GLOBULIN: 2.1 G/DL
GLUCOSE BLD-MCNC: 95 MG/DL (ref 70–99)
GLUCOSE URINE: NEGATIVE MG/DL
HCT VFR BLD CALC: 33.2 % (ref 36–48)
HDLC SERPL-MCNC: 80 MG/DL (ref 40–60)
HEMOGLOBIN: 10.1 G/DL (ref 12–16)
INR BLD: 4.46 (ref 0.86–1.14)
KETONES, URINE: NEGATIVE MG/DL
LDL CHOLESTEROL CALCULATED: 76 MG/DL
LEUKOCYTE ESTERASE, URINE: NEGATIVE
LYMPHOCYTES ABSOLUTE: 1.2 K/UL (ref 1–5.1)
LYMPHOCYTES RELATIVE PERCENT: 30 %
MCH RBC QN AUTO: 21.3 PG (ref 26–34)
MCHC RBC AUTO-ENTMCNC: 30.4 G/DL (ref 31–36)
MCV RBC AUTO: 70.1 FL (ref 80–100)
MICROSCOPIC EXAMINATION: NORMAL
MONOCYTES ABSOLUTE: 0.2 K/UL (ref 0–1.3)
MONOCYTES RELATIVE PERCENT: 6 %
NEUTROPHILS ABSOLUTE: 2.6 K/UL (ref 1.7–7.7)
NEUTROPHILS RELATIVE PERCENT: 63 %
NITRITE, URINE: NEGATIVE
PDW BLD-RTO: 21 % (ref 12.4–15.4)
PH UA: 6
PLATELET # BLD: 212 K/UL (ref 135–450)
PMV BLD AUTO: 9.1 FL (ref 5–10.5)
POIKILOCYTES: ABNORMAL
POTASSIUM SERPL-SCNC: 4.5 MMOL/L (ref 3.5–5.1)
PROTEIN UA: NEGATIVE MG/DL
PROTHROMBIN TIME: 50.8 SEC (ref 9.8–13)
RBC # BLD: 4.74 M/UL (ref 4–5.2)
SODIUM BLD-SCNC: 143 MMOL/L (ref 136–145)
SPECIFIC GRAVITY UA: 1.01
TOTAL PROTEIN: 6.9 G/DL (ref 6.4–8.2)
TRIGL SERPL-MCNC: 66 MG/DL (ref 0–150)
TSH SERPL DL<=0.05 MIU/L-ACNC: 0.6 UIU/ML (ref 0.27–4.2)
URINE TYPE: NORMAL
UROBILINOGEN, URINE: 0.2 E.U./DL
VLDLC SERPL CALC-MCNC: 13 MG/DL
WBC # BLD: 4.1 K/UL (ref 4–11)

## 2019-01-17 PROCEDURE — 4040F PNEUMOC VAC/ADMIN/RCVD: CPT | Performed by: INTERNAL MEDICINE

## 2019-01-17 PROCEDURE — 1123F ACP DISCUSS/DSCN MKR DOCD: CPT | Performed by: INTERNAL MEDICINE

## 2019-01-17 PROCEDURE — 1090F PRES/ABSN URINE INCON ASSESS: CPT | Performed by: INTERNAL MEDICINE

## 2019-01-17 PROCEDURE — G8420 CALC BMI NORM PARAMETERS: HCPCS | Performed by: INTERNAL MEDICINE

## 2019-01-17 PROCEDURE — 1036F TOBACCO NON-USER: CPT | Performed by: INTERNAL MEDICINE

## 2019-01-17 PROCEDURE — G8427 DOCREV CUR MEDS BY ELIG CLIN: HCPCS | Performed by: INTERNAL MEDICINE

## 2019-01-17 PROCEDURE — 1101F PT FALLS ASSESS-DOCD LE1/YR: CPT | Performed by: INTERNAL MEDICINE

## 2019-01-17 PROCEDURE — G8482 FLU IMMUNIZE ORDER/ADMIN: HCPCS | Performed by: INTERNAL MEDICINE

## 2019-01-17 PROCEDURE — 99214 OFFICE O/P EST MOD 30 MIN: CPT | Performed by: INTERNAL MEDICINE

## 2019-01-17 RX ORDER — WARFARIN SODIUM 2.5 MG/1
2.5 TABLET ORAL DAILY
Qty: 90 TABLET | Refills: 3 | Status: ON HOLD | OUTPATIENT
Start: 2019-01-17 | End: 2019-01-01 | Stop reason: HOSPADM

## 2019-01-17 ASSESSMENT — ENCOUNTER SYMPTOMS
SHORTNESS OF BREATH: 0
WHEEZING: 0
EYES NEGATIVE: 1
ABDOMINAL PAIN: 0
BLOOD IN STOOL: 0
COUGH: 0
CHEST TIGHTNESS: 0
GASTROINTESTINAL NEGATIVE: 1
ABDOMINAL DISTENTION: 0
CONSTIPATION: 0
DIARRHEA: 0

## 2019-01-18 LAB
ESTIMATED AVERAGE GLUCOSE: 108.3 MG/DL
HBA1C MFR BLD: 5.4 %

## 2019-01-21 ENCOUNTER — TELEPHONE (OUTPATIENT)
Dept: PRIMARY CARE CLINIC | Age: 84
End: 2019-01-21

## 2019-01-24 DIAGNOSIS — I48.19 PERSISTENT ATRIAL FIBRILLATION (HCC): ICD-10-CM

## 2019-01-24 LAB
INR BLD: 2.51 (ref 0.86–1.14)
PROTHROMBIN TIME: 28.6 SEC (ref 9.8–13)

## 2019-03-13 PROBLEM — B37.31 VULVOVAGINAL CANDIDIASIS: Status: ACTIVE | Noted: 2019-01-01

## 2019-03-25 PROBLEM — R21 RASH: Status: ACTIVE | Noted: 2019-01-01

## 2019-04-03 NOTE — PROGRESS NOTES
Subjective:      Patient ID: Gerda Oakley is a 80 y.o. female. 19 Patient presents with:  Otalgia: right side  Fatigue  Tremors: right hand            Last seen  19 Patient presents with: Annual Exam . Here with her niece Cinda Cancino     No falls / injury     Anxiety better since niece is with her           Last seen  18 Patient presents with:  Discuss Medications: Patient states shew stopped taking the Pro Fe she think it cause her to have diarrhea  and dizziness. Headache  Dizziness: off and on     No falls     Does suffer from Ch anxiety     Otalgia    Pertinent negatives include no abdominal pain, coughing or diarrhea. Fatigue   Associated symptoms include fatigue. Pertinent negatives include no abdominal pain, coughing, fever or weakness. Review of Systems   Constitutional: Positive for appetite change and fatigue. Negative for activity change, fever and unexpected weight change. Pneumonia vac  ; prevnar   Td ap    Flu vac 10/18  Zostavac     HENT:        Partials   Eyes: Negative. Glasses ; Last eye ex   S/P Bilat cataract surgery    Respiratory: Negative for cough, chest tightness, shortness of breath and wheezing. Does not smoke ; No Etoh     Cardiovascular: Negative. No HTN / CAD . No FH either      H/O Atrial Fib > on Coumadin     Gastrointestinal: Negative. Negative for abdominal distention, abdominal pain, blood in stool, constipation and diarrhea. Genitourinary: Negative for dysuria, frequency, menstrual problem, urgency and vaginal discharge. Hysterectromy   One daughter  in accident at 23      Mammogram 9/15     Musculoskeletal: Negative. DEXA  9/15  ; osteoporosis     Neurological: Positive for tremors. Negative for dizziness and weakness. Psychiatric/Behavioral: Positive for dysphoric mood (better). Negative for behavioral problems and sleep disturbance.  The patient is nervous/anxious (better). Objective:   Physical Exam   Constitutional: She is oriented to person, place, and time. She appears well-nourished. Weight stable   HENT:   Mouth/Throat: Oropharynx is clear and moist.   Nl TM bilat . Some ear wax   Eyes: Pupils are equal, round, and reactive to light. Conjunctivae and EOM are normal.   Neck: Normal range of motion. Neck supple. Cardiovascular: Normal rate and normal heart sounds. An irregular rhythm present. Pulmonary/Chest: Breath sounds normal.   Abdominal: Soft. She exhibits no distension. Musculoskeletal: Normal range of motion. Neurological: She is alert and oriented to person, place, and time. She has normal strength. She displays tremor. Skin: Skin is warm. Psychiatric: Judgment normal. Her mood appears anxious. Vitals reviewed. Assessment:      Armaan was seen today for otalgia, fatigue and tremors. Diagnoses and all orders for this visit:    Fatigue, unspecified type  Does have ch anemia . Does not tolerate Iron pills . Advised to eat liver! Apple a day     Chronic atrial fibrillation (HCC) INR was low . Recheck   -     PROTIME-INR; Future  -     CBC Auto Differential; Future    Other iron deficiency anemia  -     CBC Auto Differential; Future  -     IRON AND TIBC; Future  -     VITAMIN B12 & FOLATE;  Future        Acquired hypothyroidism   Stable On Synthroid 100 mcg / d                   Age-related osteoporosis without current pathological fracture  off Fosamax + continues calccium            Plan:              Kiesha Resendez MD

## 2019-04-09 NOTE — PROGRESS NOTES
Subjective:      Patient ID: Iesha Ramirez is a 80 y.o. female. 19  Patient presents with:  Abdominal Pain: feel weak  Fatigue              Last seen  19 Patient presents with:  Otalgia: right side  Fatigue  Tremors: right hand            Last seen  19 Patient presents with: Annual Exam . Here with her niece Mignon Law     No falls / injury     Anxiety better since niece is with her           Last seen  18 Patient presents with:  Discuss Medications: Patient states shew stopped taking the Pro Fe she think it cause her to have diarrhea  and dizziness. Headache  Dizziness: off and on     No falls     Does suffer from Ch anxiety     Otalgia    Pertinent negatives include no abdominal pain, coughing or diarrhea. Fatigue   Associated symptoms include fatigue and weakness (generalized). Pertinent negatives include no abdominal pain, coughing or fever. Review of Systems   Constitutional: Positive for fatigue. Negative for activity change, fever and unexpected weight change. Pneumonia vac  ; prevnar   Td ap    Flu vac 10/18  Zostavac     HENT:        Partials   Eyes: Negative. Glasses ; Last eye ex   S/P Bilat cataract surgery    Respiratory: Negative for cough, chest tightness, shortness of breath and wheezing. Does not smoke ; No Etoh     Cardiovascular: Negative. No HTN / CAD . No FH either      H/O Atrial Fib > on Coumadin     Gastrointestinal: Negative. Negative for abdominal distention, abdominal pain, blood in stool, constipation and diarrhea. Genitourinary: Negative for dysuria, frequency, menstrual problem, urgency and vaginal discharge. Hysterectromy   One daughter  in accident at 23      Mammogram 9/15     Musculoskeletal: Negative. DEXA  9/15  ; osteoporosis     Neurological: Positive for tremors and weakness (generalized). Negative for dizziness.    Psychiatric/Behavioral: Positive for dysphoric

## 2019-04-11 PROBLEM — T45.515A WARFARIN-INDUCED COAGULOPATHY (HCC): Status: ACTIVE | Noted: 2019-01-01

## 2019-04-11 PROBLEM — D68.32 WARFARIN-INDUCED COAGULOPATHY (HCC): Status: ACTIVE | Noted: 2019-01-01

## 2019-04-11 PROBLEM — D64.9 ANEMIA: Status: ACTIVE | Noted: 2019-01-01

## 2019-04-11 PROBLEM — K92.1 MELENA: Status: ACTIVE | Noted: 2019-01-01

## 2019-04-11 NOTE — TELEPHONE ENCOUNTER
Reason for Disposition   Tarry or jet black-colored stool   Tarry or jet black-colored stool (not dark green)    Protocols used: STOOLS - UNUSUAL COLOR-ADULT-AH, RECTAL BLEEDING-ADULT-AH    Caller states that she was recently seen with abdominal pain in the office. She states that last night she began with black tarry stools. She reports feeling extreme fatigue and dizziness. Directed the caller to the ER for further evaluation.

## 2019-04-11 NOTE — ED NOTES
Pharmacy Medication History Note      List of current medications patient is taking is complete. Source of information: [facility]    Changes made to medication list:  Medications flagged for removal (include reason, ex. noncompliance):  N/A    Medications removed (include reason, ex. therapy complete or physician discontinued):  N/A    Medications added/doses adjusted:  N/A    Other notes (ex. Recent course of antibiotics, Coumadin dosing):  INR 4.1 on 4/3/19 patient has no normal warfarin dosing, non-compliant and likes to self-dose. Office has been trying to regulate with 2.5mg QD or 5/2.5mg every other day. Denies use of other OTC or herbal medications. Last dose times updated.    Julianna

## 2019-04-11 NOTE — PROGRESS NOTES
Advanced Care Planning Note. Purpose of Encounter: Advanced care planning in light of atrial fibrillation  Parties In Attendance: Patient  Decisional Capacity: Yes  Subjective: Patient with melena  Objective: Cr 0.9  Goals of Care Determination: Patient wants full support (CPR, vent, surgery, HD, trach, PEG)  Plan:  Protonix gtt, GI consult, Vit K SQ, FFP  Code Status: Full code   Time spent on Advanced care Plannin minutes  Advanced Care Planning Documents: Completed advanced directives on chart, niece is the POA.     Ankur Anders MD  2019 5:18 PM

## 2019-04-11 NOTE — ED NOTES
Report given to 3A RN. Pt transported to floor via wheelchair with protonix infusing without difficulty.      Vinita Perez RN  04/11/19 7699

## 2019-04-11 NOTE — H&P
HOSPITALISTS HISTORY AND PHYSICAL    4/11/2019 5:17 PM    Patient Information:  Angelo Araujo is a 80 y.o. female [id number]  PCP:  Kika Yung MD (Tel: [phone] )    Chief complaint:    Chief Complaint   Patient presents with    Rectal Bleeding     pt states she saw very blood in her stool yesterday and today, states stool was very dark. denies any blood in stool previously. pt told to come to ED by pcp. History of Present Illness:  Rosa Valente is a 80 y.o. female with history of afib on Coumadin, hypothyroidism, HTN, hyperlipidemia, anxiety, allergic rhinitis, depression who came to ER with complaints of melena. States she has some lower abdominal pain. No diarrhea. No nausea or vomiting. No EtOH. No dysphagia or odynophagia. No hematochezia. No fevers, chills or NS. No travel. In ED, occult positive on rectal exam.  INR > 6 in ED. No CP, SOB, HA or syncope. No vaginal bleeding. Lives alone. Niece helps when needed. Does not drive. Otherwise complete ROS is negative unless listed above. REVIEW OF SYSTEMS:   Pertinent positives as noted in HPI. All other systems were reviewed and are negative. 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 Past Surgical History:   has a past surgical history that includes Thyroidectomy, partial (10/04). Medications:  No current facility-administered medications on file prior to encounter.       Current Outpatient Medications on File Prior to Encounter   Medication Sig Dispense Refill    polyethylene glycol (GLYCOLAX) powder DISSOLVE 17 GRAMS IN 8 OUNCES OF LIQUID AND DRINK ONCE A  g 0    pantoprazole (PROTONIX) 40 MG tablet TAKE 1 TABLET EVERY 04/11/2019    HGB 9.8 04/11/2019    HCT 32.1 04/11/2019    MCV 69.6 04/11/2019    MCH 20.9 04/11/2019    MCHC 30.0 04/11/2019    RDW 20.6 04/11/2019     04/11/2019    MPV 8.9 04/11/2019     BMP:    Lab Results   Component Value Date     04/11/2019    K 4.7 04/11/2019    K 4.1 07/31/2018     04/11/2019    CO2 25 04/11/2019    BUN 15 04/11/2019    CREATININE 0.9 04/11/2019    CALCIUM 8.9 04/11/2019    GFRAA >60 04/11/2019    GFRAA >60 12/03/2012    LABGLOM 59 04/11/2019    GLUCOSE 84 04/11/2019     No orders to display       Problem List  Principal Problem:    Melena  Active Problems:    Hypercholesterolemia    Atrial fibrillation    Essential hypertension    Hypothyroidism    Long term current use of anticoagulant therapy    Anxiety state    Depression    Allergic rhinitis, seasonal    Anemia    Warfarin-induced coagulopathy ([facility] [address]  Resolved Problems:    * No resolved hospital problems. *        Assessment/Plan:   1. Vitamin K 10 mg SQ X 1 in ED  2. Transfuse 2 U FFP  3. Hold Coumadin  4. Serial H/H  5. Transfuse PRBC if Hg < 7  6. GI consult for melena  7. Protonix gtt  8. NPO p MN for possible EGD  9. Repeat INR in AM  10. PT/OT eval  11. IVF while NPO, CLD today        DVT prophylaxis SCD  Code status Full   Diet  CLD, NPO p MN  IV access  Peripheral  Quintanilla Catheter No    Admit as inpatient. I anticipate hospitalization spanning more than two midnights for investigation and treatment of the above medically necessary diagnoses. Discussed with patient and Dr Saint Folks (ED). Will see what work up shows. Hopefully back home upon DC.     Floresita Rea MD    4/11/2019 5:17 PM

## 2019-04-11 NOTE — ED PROVIDER NOTES
[facility]  eMERGENCY dEPARTMENTeNCOUnter      Pt Name: Fredy Kurtz  MRN: [id number]  [place] 11/9/1932  Date ofevaluation: 4/11/2019  Provider: Joseph Chavez III, [address] [place] [address]       Chief Complaint   Patient presents with    Rectal Bleeding     pt states she saw very blood in her stool yesterday and today, states stool was very dark. denies any blood in stool previously. pt told to come to ED by pcp. HISTORY OF PRESENT ILLNESS   (Location/Symptom, Timing/Onset,Context/Setting, Quality, Duration, Modifying Factors, Severity)  Note limiting factors. Fredy Kurtz is a 80 y.o. female who presents to the emergency department fatigue, dark stools. She denies any known abdominal pain. She is on Coumadin and is concerned about GI bleed. Denies chest pain, chest pressure shortness of breath, dizziness or near syncope. Lucid been ongoing over the past days. Moderate, no known modifying factors     HPI    NursingNotes were reviewed. REVIEW OF SYSTEMS    (2-9 systems for level 4, 10 or more for level 5)     Review of Systems    Except as noted above the remainder of the review of systems was reviewed and negative.        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    Osteoporosis          SURGICALHISTORY       Past Surgical History:   Procedure Laterality Date    THYROIDECTOMY, PARTIAL  10/04    TUMOR-HURTHLE CELL BENIGN         CURRENT MEDICATIONS       Previous Medications    ACETAMINOPHEN (APAP EXTRA STRENGTH) 500 MG TABLET    Take 2 tablets by mouth every 6 hours as needed for Pain    CALCIUM CARBONATE-VITAMIN D (CALTRATE 600+D) 600-400 MG-UNIT TABS PER TAB    Take 2 tablets by mouth daily    LEVOTHYROXINE (SYNTHROID) 100 MCG TABLET    TAKE 1 TABLET EVERY DAY    PANTOPRAZOLE (PROTONIX) 40 MG TABLET    TAKE 1 TABLET EVERY DAY    POLYETHYLENE GLYCOL (GLYCOLAX) POWDER    DISSOLVE 17 GRAMS IN 8 OUNCES OF LIQUID AND DRINK ONCE A DAY    WARFARIN (COUMADIN) 2.5 MG TABLET    Take 1 tablet by mouth daily       ALLERGIES     Nitrofurantoin; Alendronate sodium; and Aspirin    FAMILY HISTORY       Family History   Problem Relation Age of Onset    Cancer Neg Hx     Heart Disease Neg Hx     Diabetes Neg Hx     Stroke Neg Hx           SOCIAL HISTORY       Social History     Socioeconomic History    Marital status:       Spouse name: None    Number of children: 1    Years of education: None    Highest education level: None   Occupational History    None   Social Needs    Financial resource strain: None    Food insecurity:     Worry: None     Inability: None    Transportation needs:     Medical: None     Non-medical: None   Tobacco Use    Smoking status: Never Smoker    Smokeless tobacco: Never Used    Tobacco comment: avoid tobacco   Substance and Sexual Activity    Alcohol use: No    Drug use: No    Sexual activity: None   Lifestyle    Physical activity:     Days per week: None     Minutes per session: None    Stress: None   Relationships    Social connections:     Talks on phone: None     Gets together: None     Attends [employer] service: None     Active member of club or organization: None     Attends meetings of clubs or organizations: None     Relationship status: None    Intimate partner violence:     Fear of current or ex partner: None     Emotionally abused: None     Physically abused: None     Forced sexual activity: None   Other Topics Concern    None   Social History Narrative    ** Merged History Encounter **            SCREENINGS      @FLOW([id number])@      PHYSICAL EXAM    (up to 7 for level 4, 8 or more for level 5)     ED Triage Vitals [04/11/19 1216]   BP Temp Temp Source Pulse Resp SpO2 Height Weight   129/71 98.1 °F (36.7 °C) Oral 61 16 100 % 5' 2\" (1.575 m) 111 lb (50.3 kg)       Physical Exam      Constitutional:  Well developed, well nourished, non-toxic appearance   Eyes:   conjunctiva normal   HENT:  Atraumatic, external ears normal,   Respiratory:  No respiratory distress, normal breath sounds,  Cardiovascular:  Normal rate,  GI:  Soft, nondistended, nontender, no obvious organomegaly, no mass, no rebound, no guarding   Rectal exam: Performed with female nurse Halina Ennis in the room. Stools are dark brown/black, heme positive  Musculoskeletal:  No tenderness, no deformities. Integument:  no rashes on exposed surfaces  Neurologic:  Alert & oriented x 3,  normalmotor function, normal sensory function, no focal deficits noted   Psychiatric:  Speech and behavior appropriate. No agitation.     DIAGNOSTICRESULTS             LABS:  Labs Reviewed   CBC WITH AUTO DIFFERENTIAL - Abnormal; Notable for the following components:       Result Value    Hemoglobin 9.8 (*)     Hematocrit 32.5 (*)     MCV 69.6 (*)     MCH 20.9 (*)     MCHC 30.0 (*)     RDW 20.6 (*)     Lymphocytes # 0.6 (*)     Anisocytosis 1+ (*)     Microcytes 1+ (*)     Polychromasia Occasional (*)     Hypochromia 2+ (*)     Ovalocytes Occasional (*)     Tear Drop Cells Occasional (*)     All other components within normal limits    Narrative:     Performed at:  [facility] [address], [facility], [address]   Phone [phone]   COMPREHENSIVE METABOLIC PANEL - Abnormal; Notable for the following components:    GFR Non- 59 (*)     All other components within normal limits    Narrative:     Performed at:  [facility]  [address] [facility], [address]   Phone [phone]   PROTIME-INR - Abnormal; Notable for the following components:    Protime 70.4 (*)     INR 6.18 (*)     All other components within normal limits    Narrative:     CALL  [facility] tel. [phone],  Coag results called to and She is hemodynamically stable with a normal hemoglobin a repeat H&H also normal.  She is symptomatic though and of advanced age. With some other medical comorbidities. Case discussed with Dr. Osborne Presume of hospitalist team who will admit and will consult GI. Hospitalist did ask me to give the patient some vitamin K. We'll hold on FFP at this time. CONSULTS:  IP CONSULT TO HOSPITALIST  IP CONSULT TO GI      CRITICAL CARE TIME   Total Critical Care time was 35 minutes, excluding separatelyreportable procedures. There was a high probability ofclinically significant/life threatening deterioration in the patient's condition which required my urgent intervention. GI Bleed while anticoagulated        PROCEDURES:  Unless otherwise noted below, none       Procedures    FINAL IMPRESSION      1. Gastrointestinal hemorrhage, unspecified gastrointestinal hemorrhage type    2. Anticoagulated    3.  Elevated INR          DISPOSITION/PLAN   DISPOSITION Admitted 04/11/2019 04:59:14 PM      PATIENT REFERRED TO:  Jairo Olivo MD  [address] [address]  [phone]            DISCHARGE MEDICATIONS:  New Prescriptions    No medications on file          (Please note that portions of this note were completed with a voice recognition program.  Efforts weremade to edit the dictations but occasionally words are mis-transcribed.)    Simone Browning III, DO (electronically signed)  Attending Emergency Physician          Santino Benitez III, DO  04/11/19 0544

## 2019-04-11 NOTE — ED NOTES
Pt up to bedside commode with stand by assist. Pt tolerated well. No BM at this time. Pt assisted back into bed. Given denture cup for partial upper with pt label placed on container.      Danitza Arceo RN  04/11/19 0155

## 2019-04-12 NOTE — PROGRESS NOTES
Occupational Therapy   Occupational Therapy Initial Assessment  Date: 2019   Patient Name: Tirso John  MRN: [id number]     : 1932    Date of Service: 2019    Discharge Recommendations: Tirso John scored a 19/24 on the AM-PAC ADL Inpatient form. Current research shows that an AM-PAC score of 18 or greater is typically associated with a discharge to the patient's home setting. Based on the patients AM-PAC score and their current ADL deficits, it is recommended that the patient have 2-3 sessions per week of Occupational Therapy at d/c to increase the patients independence. HOME HEALTH CARE: LEVEL 1 STANDARD    - Initial home health evaluation to occur within 24-48 hours, in patient home   - Therapy to evaluate with goal of regaining prior level of functioning   - Therapy to evaluate if patient has [address] needs for personal care       OT Equipment Recommendations  Equipment Needed: No  Other: Pt reports she needs cane. Continue to assess    Assessment   Performance deficits / Impairments: Decreased functional mobility ; Decreased ADL status; Decreased high-level IADLs  Assessment: Pt slightly below baseline d/t above deficits; OT indicated to increase safety/independence with ADL and IADL  Treatment Diagnosis: Decreased fxl mob, ADL, IADL associated with melena  Prognosis: Good  Decision Making: Low Complexity  Exam: as above  Patient Education: Role of OT, POC, discharge planning, evaluation  REQUIRES OT FOLLOW UP: Yes  Activity Tolerance  Activity Tolerance: Patient Tolerated treatment well  Safety Devices  Safety Devices in place: Yes  Type of devices: All fall risk precautions in place; Left in chair;Call light within reach;Gait belt;Nurse notified(no alarm per RN)           Patient Diagnosis(es): The primary encounter diagnosis was Gastrointestinal hemorrhage, unspecified gastrointestinal hemorrhage type.  Diagnoses of Anticoagulated and Elevated INR were also pertinent to this visit. 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 has a past surgical history that includes Thyroidectomy, partial (10/04). Treatment Diagnosis: Decreased fxl mob, ADL, IADL associated with melena      Restrictions  Restrictions/Precautions  Restrictions/Precautions: Fall Risk(high)  Position Activity Restriction  Other position/activity restrictions: 80 y.o. female with history of afib on Coumadin, hypothyroidism, HTN, hyperlipidemia, anxiety, allergic rhinitis, depression who came to ER with complaints of melena. States she has some lower abdominal pain. No diarrhea. No nausea or vomiting. No EtOH. No dysphagia or odynophagia. No hematochezia. No fevers, chills or NS. No travel. In ED, occult positive on rectal exam.  INR > 6 in ED. No CP, SOB, HA or syncope. No vaginal bleeding. Lives alone. Niece helps when needed. Does not drive. Otherwise complete ROS is negative unless listed above    Subjective   General  Chart Reviewed: Yes  Diagnosis: melena  Subjective  Subjective: Pt seated in recliner at time of therapist arrival. Agreeable to evaluation; denies pain     Social/Functional History  Social/Functional History  Lives With: Alone(neighbor visits, neice helps PRN)  Type of Home: House  Home Layout: Bed/Bath upstairs, Multi-level(tri level; bed upstairs, shower down stairs.  6 steps up, 6 steps down)  Home Access: Level entry  Bathroom Shower/Tub: Walk-in shower  Bathroom Toilet: Standard  Bathroom Equipment: Grab bars in shower  Bathroom Accessibility: Walker accessible  Home Equipment: Cane, Rolling walker  ADL Assistance: Independent(pt reports independence, yet takes hours to complete)  Homemaking Assistance: Needs assistance(assist for grocery shopping, now requires assist for cooking d/t difficulty \"being on feet\")  Ambulation Assistance: Independent(wit hhusbands cane. pt reports she needs new cane)  Transfer Assistance: Independent(pt reports difficulty)  Active : (pt reports she has driven less recently)  Leisure & Hobbies: talking to friends  Additional Comments: no falls reported, however pt reports losing balance occasionally. Objective   Vision: Impaired  Vision Exceptions: Wears glasses for reading  Hearing: Exceptions to WFL(L > R)  Hearing Exceptions: Hard of hearing/hearing concerns    Orientation  Overall Orientation Status: Within Functional Limits     Balance  Sitting Balance: Supervision  Standing Balance: Stand by assistance(during fxl mob, stance at sink)  Standing Balance  Sit to stand: Stand by assistance  Stand to sit: Contact guard assistance  Functional Mobility  Functional - Mobility Device: Rolling Walker  Activity: Other  Assist Level: Stand by assistance  Functional Mobility Comments: CGA initially progressing to SBA. Arm chair > stance at sink > hallway ambullation ~70' > arm chair  ADL  Grooming: Stand by assistance;Setup(oral/hand hygiene in stance at sink)  UE Dressing: Minimal assistance(donning gown as robe)  LE Dressing: Contact guard assistance(pulling hospital pants to waist in stance. SBA for doffing/donning clean socks seated)  Tone RUE  RUE Tone: Normotonic  Tone LUE  LUE Tone: Normotonic  Coordination  Coordination and Movement description: Tremors  Quality of Movement Other  Comment: Pt reports tremors in BUE past 2 months     Bed mobility  Comment: pt in arm chair at start/end of session  Transfers  Sit to stand: Stand by assistance  Stand to sit: Contact guard assistance  Transfer Comments: Initially CGA for sit>stand, progressed to SBA.  Completed 3x total. CGA d/t decreased eccentric control     Cognition  Overall Cognitive Status: WFL        Sensation  Overall Sensation Status: WFL(reports occasional neuropathy in BUE digits)        LUE AROM (degrees)  LUE AROM : WFL  RUE AROM (degrees)  RUE AROM : WFL  LUE Strength  Gross LUE Strength: WFL  L Hand Grasp: 4+/5  RUE Strength  Gross RUE Strength: WFL  R Hand Grasp: 4+/5                   Plan   Plan  Times per week: 3-5  Times per day: Daily    G-Code     OutComes Score                                                  AM-PAC Score        AM-[facility] Inpatient Daily Activity Raw Score: 19  AM-PAC Inpatient ADL T-Scale Score : 40.22  ADL Inpatient CMS 0-100% Score: 42.8  ADL Inpatient CMS G-Code Modifier : CK    Goals  Short term goals  Time Frame for Short term goals: discharge  Short term goal 1: Fxl mob for ADL mod I  Short term goal 2: Fxl transfers for ADL mod I  Short term goal 3: UB/LB bathing mod I  Short term goal 4: UB/LB dressing mod I  Short term goal 5:  Toileting mod I  Long term goals  Time Frame for Long term goals : LTG=STG       Therapy Time   Individual Concurrent Group Co-treatment   Time In 0847         Time Out 0944         Minutes 57            Timed Code Treatment Minutes:   42 minutes    Total Treatment Minutes:  57 minutes    Marylee Hark, MOT OTR/L [id number]    Love [employer], OT

## 2019-04-12 NOTE — PROGRESS NOTES
Vitals:    04/12/19 0854   BP: 128/67   Pulse: 67   Resp: 16   Temp: 97 °F (36.1 °C)   SpO2: 96%     AM assessment completed, pt is A&Ox4, speaks broken english, SBA with walker for ambulation. Uses call light approprietly for assistance. No s/s of abnormal bleeding at this time. Denies any N/V or loose stools. Pt denies chest pain or SOA. Sats are stable on RA. Lung sounds are diminished through out. POC reviewed and pt is agreeable to continue IV Protonix and fluids as ordered. Remains NPO for possible EGD, coumadin is on hold and will monitor INR's. Fall precautions in place for safety, bed is in low position and wheels are locked. Pt is wearing nonskid sock and bed alarm is engaged. Call light in reach for wants and needs, will monitor.

## 2019-04-12 NOTE — PLAN OF CARE
Problem: Bleeding:  Goal: Will show no signs and symptoms of excessive bleeding  Description  Will show no signs and symptoms of excessive bleeding  Outcome: Ongoing  Note:   Pt displayed no signs of bleeding. Will continue to monitor.

## 2019-04-12 NOTE — CONSULTS
Recent Labs     04/09/19  1048 04/11/19  1241 04/11/19  1550 04/12/19  0010   WBC 4.3 4.0  --   --    HGB 9.4* 9.8* 9.8* 9.0*   HCT 30.6* 32.5* 32.1* 29.0*   MCV 69.3* 69.6*  --   --     191  --   --      Recent Labs     04/09/19  1048 04/11/19  1242    140   K 4.0 4.7    103   CO2 25 25   BUN 14 15   CREATININE 0.9 0.9     Recent Labs     04/09/19  1048 04/11/19  1242   AST 27 28   ALT 14 12   BILITOT 0.5 0.5   ALKPHOS 51 52     Recent Labs     04/09/19  1048   LIPASE 43.0     Recent Labs     04/11/19  1241   PROTIME 70.4*   INR 6.18*     No results for input(s): PTT in the last 72 hours. No results for input(s): OCCULTBLD in the last 72 hours. Assessment:     Principal Problem:    Melena  Active Problems:    Hypercholesterolemia    Atrial fibrillation    Essential hypertension    Hypothyroidism    Long term current use of anticoagulant therapy    Anxiety state    Depression    Allergic rhinitis, seasonal    Anemia    Warfarin-induced coagulopathy ([facility]  Resolved Problems:    * No resolved hospital problems. *    Iron deficiency anemia - h/o this 7/2018. hgb was 7.6 then. More recently hgb around 10 and is stable now at 10.2. Reports black stool but did take pepto bismol which can make stool black. BUN:cr is not elevated as would expect if pt had melena/upper GI bleed. Elevated INR - was on coumadin for afib. INR was 6 at admission. Coumadin being held. She received vitamin k 10 mg and 2 U FFP and repeat INR still 2.9. Suprapubic pain - UA ok. Recommendations:   - monitor hgb  - on PPI  - full liquids  - IV iron replacement  - INR still too high for EGD and may not have been true melena with normal BUN:cr and stable hgb. Would monitor overnight. If remains stable, rec outpatient EGD and colonoscopy for iron deficiency anemia.     Discussed with Dr. Ashish Hollins, [address]    I have personally performed a face to face diagnostic

## 2019-04-12 NOTE — PROGRESS NOTES
[facility]    4/12/2019 12:26 PM        Name: Krish Jansen Admitted: 4/11/2019  Primary Care Provider: Celia Cordova MD (Tel: [phone])    Brief Course:  80 y.o. female with history of afib on Coumadin, hypothyroidism, HTN, hyperlipidemia, anxiety, allergic rhinitis, depression who came to ER with complaints of melena. INR > 6 in ED and occult positive. Admitted as inpatient for further management. Started on Protonix gtt. Given Vit K SQ and 2 U FFP on 4/11. INR 2.9 on 4/12. GI consult pending. CC:  Melena    Subjective:  . Patient denies CP, SOB, HA or abdominal pain. No melena.       Reviewed interval ancillary notes    Current Medications    levothyroxine (SYNTHROID) tablet 100 mcg QAM AC   0.9 % sodium chloride bolus Once   acetaminophen (TYLENOL) tablet 1,000 mg Q6H PRN   calcium-vitamin D (OSCAL-500) 500-200 MG-UNIT per tablet 2 tablet Daily   sodium chloride flush 0.9 % injection 10 mL 2 times per day   sodium chloride flush 0.9 % injection 10 mL PRN   magnesium hydroxide (MILK OF MAGNESIA) 400 MG/5ML suspension 30 mL Daily PRN   ondansetron (ZOFRAN) injection 4 mg Q6H PRN   0.9 % sodium chloride infusion Continuous   potassium chloride (KLOR-CON M) extended release tablet 40 mEq PRN   Or    potassium bicarb-citric acid (EFFER-K) effervescent tablet 40 mEq PRN   Or    potassium chloride 10 mEq/100 mL IVPB (Peripheral Line) PRN   potassium chloride 10 mEq/100 mL IVPB (Peripheral Line) PRN   pantoprazole (PROTONIX) 80 mg in sodium chloride 0.9 % 100 mL infusion Continuous       Objective:  /67   Pulse 67   Temp 97 °F (36.1 °C) (Temporal)   Resp 16   Ht 5' 2\" (1.575 m)   Wt 103 lb 9.6 oz (47 kg)   LMP 09/23/1983   SpO2 96%   BMI 18.95 kg/m²     Intake/Output Summary (Last 24 hours) at 4/12/2019 1226  Last data filed at 4/12/2019 0946  Gross per 24 hour Intake 10 ml   Output --   Net 10 ml    Wt Readings from Last 3 Encounters:   04/12/19 103 lb 9.6 oz (47 kg)   04/09/19 110 lb (49.9 kg)   04/03/19 110 lb (49.9 kg)       General appearance:  Thin, frail, awake, NAD, pleasant, sitting in chair  Eyes: Sclera clear, pupils equal  ENT: Moist mucus membranes, no thrush. Trachea midline. Cardiovascular: Regular rhythm, normal S1, S2. No murmur, gallop, rub. No edema in lower extremities  Respiratory: Clear to auscultation bilaterally, no wheeze, good inspiratory effort  Gastrointestinal: Abdomen soft, no lower abdominal tenderness today, no rebound, no guarding, not distended, normal bowel sounds  Musculoskeletal: No cyanosis in digits, neck supple  Neurology: Grossly intact. Alert and oriented in time, place and person. No speech or motor deficits  Psychiatry: Appropriate affect. Not agitated  Skin: Warm, dry, normal turgor, no rash  Brisk capillary refill, peripheral pulses palpable         Labs and Tests:  CBC:   Recent Labs     04/11/19  1241 04/11/19  1550 04/12/19  0010 04/12/19  0841   WBC 4.0  --   --  3.9*   HGB 9.8* 9.8* 9.0* 10.2*     --   --  203     BMP:  Recent Labs     04/11/19  1242 04/12/19  0841    144   K 4.7 4.3    107   CO2 25 27   BUN 15 12   CREATININE 0.9 1.0   GLUCOSE 84 88     Hepatic: Recent Labs     04/11/19  1242   AST 28   ALT 12   BILITOT 0.5   ALKPHOS 52     No orders to display         Problem List  Principal Problem:    Melena  Active Problems:    Hypercholesterolemia    Atrial fibrillation    Essential hypertension    Hypothyroidism    Long term current use of anticoagulant therapy    Anxiety state    Depression    Allergic rhinitis, seasonal    Anemia    Warfarin-induced coagulopathy ([facility] [address]  Resolved Problems:    * No resolved hospital problems. *       Assessment & Plan:   1. NPO, diet per GI  2. Cont IVF  3. Transfuse 2 U FFP today as INR 2.9 for possible EGD  4. GI consult pending  5.  Cont Protonix gtt for now; further per GI  6. Hold Coumadin  7. Serial H/H  8. PT/OT eval for dispo  9. Signed PURNIMA; may need home care orders based on PT/OT eval    IV Access: Peripheral  Quintanilla: No  Diet: Diet NPO, After Midnight  Code:Full Code  DVT PPX SCD  Disposition Home    Discussed with patient, Cristóbal Dutta (GI PA), nursing and CM. Plans per GI.         Wayne Salcedo MD   4/12/2019 12:26 PM

## 2019-04-12 NOTE — DISCHARGE INSTR - COC
 Osteoporosis M81.0    Allergic rhinitis, seasonal J30.2    Long term current use of anticoagulant therapy Z79.01    Chest pain R07.9    Abdominal pain R10.9    Bradycardia R00.1    Fatigue R53.83    Basal cell carcinoma of mandible C44.319    Acute cystitis with hematuria N30.01    Resting tremor R25.9    Atrial fibrillation with RVR (HCC) I48.91    Hypothyroidism E03.9    Vulvovaginal candidiasis B37.3    Rash R21    Melena K92.1    Anemia D64.9    Warfarin-induced coagulopathy (HCC) D68.32, T45.515A       Isolation/Infection:   Isolation          No Isolation            Nurse Assessment:  Last Vital Signs: /67   Pulse 67   Temp 97 °F (36.1 °C) (Temporal)   Resp 16   Ht 5' 2\" (1.575 m)   Wt 103 lb 9.6 oz (47 kg)   LMP 09/23/1983   SpO2 96%   BMI 18.95 kg/m²     Last documented pain score (0-10 scale): Pain Level: 0  Last Weight:   Wt Readings from Last 1 Encounters:   04/12/19 103 lb 9.6 oz (47 kg)     Mental Status:  oriented and alert    IV Access:  - None    Nursing Mobility/ADLs:  Walking   Assisted  Transfer  Assisted  Bathing  Assisted  Dressing  Assisted  Toileting  Assisted  Feeding  Independent  Med Admin  Independent  Med Delivery   whole    Wound Care Documentation and Therapy:        Elimination:  Continence:   · Bowel: Yes  · Bladder: Yes  Urinary Catheter: None   Colostomy/Ileostomy/Ileal Conduit: No       Date of Last BM: 4/16/2019    Intake/Output Summary (Last 24 hours) at 4/12/2019 1231  Last data filed at 4/12/2019 0946  Gross per 24 hour   Intake 10 ml   Output --   Net 10 ml     No intake/output data recorded. Safety Concerns:      At Risk for Falls    Impairments/Disabilities:      Speech and Vision     Nutrition Therapy:  Current Nutrition Therapy:   - Oral Diet:  General    Routes of Feeding: Oral  Liquids: No Restrictions  Daily Fluid Restriction: no  Last Modified Barium Swallow with Video (Video Swallowing Test): not done    Treatments at the Time of Hospital Discharge:   Respiratory Treatments: N/A  Oxygen Therapy:  is not on home oxygen therapy. Ventilator:    - No ventilator support    Rehab Therapies: SN,PT,OT,MSW,HHA  Weight Bearing Status/Restrictions: No weight bearing restirctions  Other Medical Equipment (for information only, NOT a DME order):  N/A  Other Treatments: HOME HEALTH CARE: LEVEL 3 SAFETY        -Initial home health evaluation to occur within 24-48 hours, in patient home    -Home health agency to establish plan of care for patient over 60 day period    -Medication Reconciliation    -PT/OT/Speech evaluations in home within 24-48 hours of discharge; including  -DME and home safety    -Frontload therapy 5 days, then 3x a week    -OT to evaluate if patient has [address] needs for personal care    - evaluation within 24-48 hours, includes evaluation of resources   and insurance to determine [place], [place], LTC, and Medicaid options    -PCP Visit scheduled within three to seven days of discharge       Patient's personal belongings (please select all that are sent with patient):  Glasses    RN SIGNATURE:  Electronically signed by Geraldo Paniagua RN on 4/16/19 at 11:50 AM    CASE MANAGEMENT/SOCIAL WORK SECTION    Inpatient Status Date: 4/16/19    Readmission Risk Assessment Score:  Readmission Risk              Risk of Unplanned Readmission:        11           Discharging to Facility/ Agency   FACILITY:     [address] [place] [address]:   [address] [place] [address]   PHONE:        [phone]:              [phone]    [place] on [address]    / signature: Toribio MARCUM, [facility]  [phone]    PHYSICIAN SECTION    Prognosis: Fair    Condition at Discharge: Stable    Rehab Potential (if transferring to Rehab): Fair    Recommended Labs or Other Treatments After Discharge: Check PT/INR daily CALL RESULTS TO PCP DR. MAYORGA, restart coumadin when INR < 3

## 2019-04-13 NOTE — PLAN OF CARE
Problem: Falls - Risk of:  Goal: Will remain free from falls  Description  Will remain free from falls  4/13/2019 1144 by Pito Acevedo RN  Outcome: Ongoing  4/12/2019 2219 by Daniela Rivero RN  Note:   Pt has remained free from any falls so far this shift. Non-skid socks on. Bed alarm activated. Bed in lowest and locked position. Pt x1 assist when out of bed. Pt belongings within reach. Call light within reach. Problem: Bleeding:  Goal: Will show no signs and symptoms of excessive bleeding  Description  Will show no signs and symptoms of excessive bleeding  Outcome: Ongoing   Shift assessment complete, vss, denies pain just states she doesn't feel well. Okay to stop protonix gtt, alert and oriented,. Patient does not feel safe to return to home by herself and is okay for therapy MD aware.  No bloody bm this shift

## 2019-04-13 NOTE — PROGRESS NOTES
Hospital Medicine Progress Note     Date:  4/13/2019    PCP: Tali Finley MD (Tel: [phone])    Date of Admission: 4/11/2019      Brief hospital course: 80 y.o. female with history of afib on Coumadin, hypothyroidism, HTN, hyperlipidemia, anxiety, allergic rhinitis, depression who came to ER with complaints of melena. INR > 6 in ED and occult positive. Admitted as inpatient for further management. Started on Protonix gtt. Given Vit K SQ and 2 U FFP on 4/11. INR 2.9 on 4/12. Pt GI evaluated and recommended outpt EGD & Colonoscopy. Subjective  Feels tired and weak today. Objective  Physical exam:  Vitals: /60   Pulse 81   Temp 97.7 °F (36.5 °C) (Temporal)   Resp 16   Ht 5' 2\" (1.575 m)   Wt 103 lb 3.2 oz (46.8 kg)   LMP 09/23/1983   SpO2 100%   BMI 18.88 kg/m²   Gen: Not in distress. Alert. Head: Normocephalic. Atraumatic. Eyes: EOMI. Good acuity. ENT: Oral mucosa moist  Neck: No JVD. No obvious thyromegaly. CVS: Nml S1S2, no MRG, RRR  Pulmomary: Clear bilaterally. No crackles. No wheezes. Gastrointestinal: Soft, NT/ND. Positive bowel sounds. Musculoskeletal: No edema. Warm  Neuro: No focal deficit. Moves extremity spontaneously. Psychiatry: Appropriate affect. Not agitated. Skin: Warm, dry with normal turgor. No rash      24HR INTAKE/OUTPUT:      Intake/Output Summary (Last 24 hours) at 4/13/2019 1342  Last data filed at 4/13/2019 0800  Gross per 24 hour   Intake 485 ml   Output --   Net 485 ml     I/O last 3 completed shifts:   In: 345 [I.V.:10; Blood:335]  Out: -   I/O this shift:  In: 150 [P.O.:150]  Out: -       Meds:    pantoprazole  40 mg Intravenous BID    warfarin  5 mg Oral Daily    levothyroxine  100 mcg Oral QAM AC    sodium chloride  250 mL Intravenous Once    iron sucrose (VENOFER) iv piggyback 100 mL (Admin over 60 minutes)  200 mg Intravenous Q24H    calcium-vitamin D  2 tablet Oral Daily    sodium chloride flush  10 mL Intravenous 2 times per day       Infusions:       PRN Meds: acetaminophen, sodium chloride flush, magnesium hydroxide, ondansetron, potassium chloride **OR** potassium alternative oral replacement **OR** potassium chloride, potassium chloride    Labs/imaging:  CBC:   Recent Labs     04/11/19  1241  04/12/19  0841 04/12/19  1645 04/12/19  2354 04/13/19  0805   WBC 4.0  --  3.9*  --   --  7.1   HGB 9.8*   < > 10.2* 9.5* 9.6* 10.0*     --  203  --   --  175    < > = values in this interval not displayed. BMP:    Recent Labs     04/11/19  1242 04/12/19  0841 04/13/19  0805    144 142   K 4.7 4.3 4.0    107 103   CO2 25 27 26   BUN 15 12 11   CREATININE 0.9 1.0 0.8   GLUCOSE 84 88 103*         Hepatic:   Recent Labs     04/11/19  1242   AST 28   ALT 12   BILITOT 0.5   ALKPHOS 52       Troponin: No results for input(s): TROPONINI in the last 72 hours. BNP: No results for input(s): BNP in the last 72 hours. INR:   Recent Labs     04/12/19  0841 04/13/19  0805 04/13/19  1134   INR 2.90* 1.47* 1.49*           Reviewed imaging and reports noted      Assessment:  Principal Problem:    Melena  Active Problems:    Hypercholesterolemia    Atrial fibrillation    Essential hypertension    Hypothyroidism    Long term current use of anticoagulant therapy    Anxiety state    Depression    Allergic rhinitis, seasonal    Anemia    Warfarin-induced coagulopathy ([facility] [address]  Resolved Problems:    * No resolved hospital problems. *        Plan:  Melena  -GI evaluated and S/O, recommended outpt EGD & colonoscopy  - HgB has remained stable, monitor H&h Q12hrs  - change protonix gtt to IV BID and DC on PO PPI      Chronic A. Fib  - rate controlled w/o meds, restart warfarin for AC ([id number] 4) with no bridge      Hypothyroidism  - cont synthroid      Anemia  - HgB at baseline, will get 3 doses of IV Venofer, CTM      DIspo: Pt lives by herself and would benefit from SNF vs ECF, SW consulted.       Diet: DIET FULL LIQUID; Activity: up with assist  Prophylaxis: coumadin/SCD     Code status: Full Code     ----------        Howard Gray MD  -------------------------------  Rounding hospitalist

## 2019-04-13 NOTE — FLOWSHEET NOTE
[facility]. When asking about LW and [facility] she states she already has them. When asked who her [facility] is she says her nephew but when asked his name her reply was \"I'm not saying his name. \" She also mentioned he is an . When conversation continued her stated \"I do not understand. \" Not pursuing conversation at this time.

## 2019-04-13 NOTE — PROGRESS NOTES
Shift assessment complete. Evening medications given, see MAR for details. Pt denies any pain or needs at this time. Pt contact guard x1 assist to bathroom. Pt calls out appropriately. Non-skid socks on. Bed alarm activated. Pt resting in bed with no signs of distress. Call light within reach.

## 2019-04-13 NOTE — PLAN OF CARE
Problem: Falls - Risk of:  Goal: Will remain free from falls  Description  Will remain free from falls  Outcome: Ongoing  Goal: Absence of physical injury  Description  Absence of physical injury  Outcome: Ongoing     Problem: Bleeding:  Goal: Will show no signs and symptoms of excessive bleeding  Description  Will show no signs and symptoms of excessive bleeding  Outcome: Ongoing

## 2019-04-13 NOTE — PROGRESS NOTES
Physical Therapy    Facility/Department: [facility] 3A NURSING  Initial Assessment    NAME: Cassi Goel  : 1932  MRN: [id number]    Date of Service: 2019    Discharge Recommendations:Armaan Barker scored a 16/24 on the AM-PAC short mobility form. Current research shows that an AM-PAC score of 17 or less is typically not associated with a discharge to the patient's home setting. Based on the patients AM-PAC score and their current functional mobility deficits, it is recommended that the patient have 3-5 sessions per week of Physical Therapy at d/c to increase the patients independence. Pt not safe for return home alone. PT Equipment Recommendations  Equipment Needed: No    Assessment   Body structures, Functions, Activity limitations: Decreased functional mobility ; Decreased ADL status; Decreased strength;Decreased endurance;Decreased balance  Assessment: Pt with decreased functional mobility, decreased strength, balance, endurance. Pt does not feel safe to return home alone at this time. Multiple stairs in home. Pt depending heavily on neighbors help when feeling well. They are not able to provide for her needs currently. Pt needing skilled PT services to address these issues. Treatment Diagnosis: weakness, difficulty with gait. Prognosis: Good  Decision Making: Medium Complexity  Patient Education: PT POC, DC recommendations, RW recommendations. REQUIRES PT FOLLOW UP: Yes  Activity Tolerance  Activity Tolerance: Patient limited by endurance; Patient limited by fatigue       Patient Diagnosis(es): The primary encounter diagnosis was Gastrointestinal hemorrhage, unspecified gastrointestinal hemorrhage type. Diagnoses of Anticoagulated and Elevated INR were also pertinent to this visit. 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    has a past surgical history that includes Thyroidectomy, partial (10/04). Restrictions  Restrictions/Precautions  Restrictions/Precautions: Fall Risk(high fall risk)  Position Activity Restriction  Other position/activity restrictions: Katarzyna Ross is a 80 y.o. female who presents to the emergency department fatigue, dark stools. She denies any known abdominal pain. She is on Coumadin and is concerned about GI bleed. Denies chest pain, chest pressure shortness of breath, dizziness or near syncope. Lucid been ongoing over the past days. Moderate, no known modifying factors   Vision/Hearing        Subjective  General  Chart Reviewed: Yes  Response To Previous Treatment: Patient with no complaints from previous session. Family / Caregiver Present: No  Diagnosis: GI bleed  Follows Commands: Within Functional Limits  General Comment  Comments: Supine in bed upon arrival.   Subjective  Subjective: Denies pain. Needing to use bathroom urgently. Likes to be called \"[place]\". Pain Screening  Patient Currently in Pain: Denies  Vital Signs  Patient Currently in Pain: Denies       Orientation  Orientation  Overall Orientation Status: Within Functional Limits  Social/Functional History  Social/Functional History  Lives With: Alone(neighbor visits, neice helps PRN)  Type of Home: House  Home Layout: Bed/Bath upstairs, Multi-level(tri level; bed upstairs, shower down stairs.  6 steps up, 6 steps down)  Home Access: Level entry  Bathroom Shower/Tub: Walk-in shower  Bathroom Toilet: Standard  Bathroom Equipment: Grab bars in shower  Bathroom Accessibility: Walker accessible  Home Equipment: Cane, Rolling walker  Receives Help From: Friend(s)(neighbors, a niece that helps occasionally.)  ADL Assistance: Independent(pt reports independence, yet takes hours to complete)  Homemaking Assistance: Needs assistance(assist for grocery shopping, now requires assist for cooking d/t difficulty \"being on feet\")  Ambulation Assistance: Independent(wit hhusbands cane. pt reports she needs new cane)  Transfer Assistance: Independent(pt reports difficulty)  Active : No(pt reports she has driven less recently, neighbors drive when needed. )  Leisure & Hobbies: talking to friends  Additional Comments: no falls reported, however pt reports losing balance occasionally. She has an old cane that belonged to her , but it is too high for her and does not work well. Cognition        Objective     Observation/Palpation  Posture: Fair(increased kyphosis)  Observation: Pt moves very slowly. AROM RLE (degrees)  RLE AROM: WFL  AROM LLE (degrees)  LLE AROM : WFL  Strength RLE  Comment: grossly +3/5  Strength LLE  Comment: grossly +3/5     Sensation  Overall Sensation Status: WFL  Bed mobility  Supine to Sit: Stand by assistance  Scooting: Stand by assistance  Comment: Pt needing much additional time to complete task  Transfers  Sit to Stand: Stand by assistance(from EOB x 3, toilet x 1)  Stand to sit: Stand by assistance  Comment: VCs for hand placement for safety. Sits hard in chairs without VCs. CGA standing at sink for hand washing due to post sway. Ambulation  Ambulation?: Yes  More Ambulation?: Yes  Ambulation 1  Surface: level tile  Device: Rolling Walker  Assistance: Stand by assistance  Quality of Gait: flexed trunk, decreased step length and height. Distance: Pt amb with RW and SBA for 10 ft x 2. Comments: Pt amb to bathroom and back to EOB. Pt able to perform her own hygiene. Assist needed for donning brief, unable to get over her feet. She performed her own hygiene and CGA standing at sink for hand washing, post sway. Ambulation 2  Surface - 2: level tile  Device 2: Single point cane  Assistance 2: Contact guard assistance  Quality of Gait 2: narrow GENESIS, fatigue with amb. Distance: Pt amb with SPC for 80 ft with CGA. Comments: Pt only uses cane at home. Pt swaying and narrow GENESIS needing close CGA.  Pt instructed in needing to use RW at this time for safe amb. Balance  Posture: Good  Sitting - Static: Good  Sitting - Dynamic: Good  Standing - Static: Fair  Standing - Dynamic: Fair  Comments: Pt CGA for amb with cane, SBA with RW. Needs to use RW at all times. Plan   Plan  Times per week: 3-5x/week  Times per day: Daily  Current Treatment Recommendations: Strengthening, Balance Training, Functional Mobility Training, Transfer Training, Endurance Training, Stair training, Gait Training, Patient/Caregiver Education & Training, Equipment Evaluation, Education, & procurement, Home Exercise Program, Safety Education & Training  Safety Devices  Type of devices: All fall risk precautions in place, Call light within reach, Chair alarm in place, Gait belt, Patient at risk for falls, Left in chair, Nurse notified  Restraints  Initially in place: No    G-Code       OutComes Score                                                  AM-PAC Score  AM-PAC Inpatient Mobility Raw Score : 16  AM-PAC Inpatient T-Scale Score : 40.78  Mobility Inpatient CMS 0-100% Score: 54.16  Mobility Inpatient CMS G-Code Modifier : CK          Goals  Short term goals  Time Frame for Short term goals: To be met by DC. Short term goal 1: Pt to perform bed mob with mod I. Short term goal 2: Pt to perform transfers with mod I. Short term goal 3: Pt to perform amb with RW and supervision for 150 ft. Short term goal 4: Pt to amb up/down flight of steps with SBA.    Long term goals  Time Frame for Long term goals : LTGs=STGs  Patient Goals   Patient goals : safe with walking, to feel stronger       Therapy Time   Individual Concurrent Group Co-treatment   Time In 1314         Time Out 1359         Minutes 45         Timed Code Treatment Minutes: [address], [id number]

## 2019-04-13 NOTE — PLAN OF CARE
Problem: Falls - Risk of:  Goal: Will remain free from falls  Description  Will remain free from falls  4/12/2019 2219 by Jean-Paul Martinez RN  Note:   Pt has remained free from any falls so far this shift. Non-skid socks on. Bed alarm activated. Bed in lowest and locked position. Pt x1 assist when out of bed. Pt belongings within reach. Call light within reach.

## 2019-04-14 NOTE — CARE COORDINATION
Discharge Planning Assessment  RN/SW discharge planner met with patient/(and family member) to discuss reason for admission, current living situation, and potential needs at the time of discharge    Demographics/Insurance verified Yes    Current type of dwelling:trilevel home    Living arrangements:home alone    Level of function/Support:stated independent prior to admission    PCP:Omer    Last Visit to PCP:few days ago    DME:stated uses cane regularly    Active with any community resources/agencies/skilled home care:stated none    Medication compliance issues:stated compliant    Financial issues that could impact healthcare:stated none    Transportation at the time of discharge:TBD    Tentative discharge plan:Met w/pt regarding PT/OT recommendations for SNF. CharlottekarissaDelisa Seeds [phone] stated [facility] is first choice. She will cont to review list and notify Sw of choices. Referral faxed to [facility]. Jim will follow through d/c.     Electronically signed by TRIXIE Ding on 4/14/2019 at 2:06 PM

## 2019-04-14 NOTE — PROGRESS NOTES
Shift assessment complete. Evening medications given, see MAR for details. Pt alert and oriented x4. Pt stand by assist when OOB. Bed alarm activated. Non-skid socks on. Pt resting in bed with no signs of distress. Pt denies any pain or needs at this time. Pt belongings within reach. Call light within reach.

## 2019-04-14 NOTE — PROGRESS NOTES
Hospital Medicine Progress Note     Date:  4/14/2019    PCP: Clementine Carter MD (Tel: [phone])    Date of Admission: 4/11/2019      Brief hospital course: 80 y.o. female with history of afib on Coumadin, hypothyroidism, HTN, hyperlipidemia, anxiety, allergic rhinitis, depression who came to ER with complaints of melena. INR > 6 in ED and occult positive. Admitted as inpatient for further management. Started on Protonix gtt. Given Vit K SQ and 2 U FFP on 4/11. INR 2.9 on 4/12. Pt GI evaluated and recommended outpt EGD & Colonoscopy. Subjective  Feeling a better today. Objective  Physical exam:  Vitals: /68   Pulse 71   Temp 97.2 °F (36.2 °C) (Temporal)   Resp 16   Ht 5' 2\" (1.575 m)   Wt 103 lb 14.4 oz (47.1 kg)   LMP 09/23/1983   SpO2 98%   BMI 19.00 kg/m²   Gen: Not in distress. Alert. Head: Normocephalic. Atraumatic. Eyes: EOMI. Good acuity. ENT: Oral mucosa moist  Neck: No JVD. No obvious thyromegaly. CVS: Nml S1S2, no MRG, RRR  Pulmomary: Clear bilaterally. No crackles. No wheezes. Gastrointestinal: Soft, NT/ND. Positive bowel sounds. Musculoskeletal: No edema. Warm  Neuro: No focal deficit. Moves extremity spontaneously. Psychiatry: Appropriate affect. Not agitated. Skin: Warm, dry with normal turgor. No rash      24HR INTAKE/OUTPUT:      Intake/Output Summary (Last 24 hours) at 4/14/2019 1226  Last data filed at 4/14/2019 0800  Gross per 24 hour   Intake 360 ml   Output --   Net 360 ml     I/O last 3 completed shifts:   In: 270 [P.O.:270]  Out: -   I/O this shift:  In: 240 [P.O.:240]  Out: -       Meds:    warfarin  7.5 mg Oral Once    [START ON 4/15/2019] warfarin  5 mg Oral Daily    pantoprazole  40 mg Intravenous BID    levothyroxine  100 mcg Oral QAM AC    sodium chloride  250 mL Intravenous Once    iron sucrose (VENOFER) iv piggyback 100 mL (Admin over 60 minutes)  200 mg Intravenous Q24H    calcium-vitamin D  2 tablet Oral Daily    sodium SNF vs ECF, SW following, precert/placement pending.       Diet: DIET FULL LIQUID;    Activity: up with assist  Prophylaxis: coumadin/SCD     Code status: Full Code     ----------        Yared Perkins MD  -------------------------------  Raman hospitalist

## 2019-04-15 NOTE — PROGRESS NOTES
Occupational Therapy  Facility/Department: [facility] 3A NURSING  Daily Treatment Note  NAME: Polly Beauchamp  : 1932  MRN: [id number]    Date of Service: 4/15/2019    Discharge Recommendations: Polly Beauchamp scored a 19/24 on the AM-PAC ADL Inpatient form. Current research shows that an AM-PAC score of 17 or less is typically not associated with a discharge to the patient's home setting. However, based on the patient's decreased task tolerance and subsequent ADL deficits, it is recommended that the patient have 3-5 sessions per week of Occupational Therapy at d/c to increase the patients independence. OT Equipment Recommendations  Equipment Needed: No  Other: Pt reports she needs cane. Continue to assess    Assessment   Performance deficits / Impairments: Decreased functional mobility ; Decreased ADL status; Decreased high-level IADLs  Assessment: Pt slightly below baseline d/t above deficits; OT indicated to increase safety/independence with ADL and IADL  Treatment Diagnosis: Decreased fxl mob, ADL, IADL associated with melena  Prognosis: Good  Exam: as above  Patient Education: Role of OT, POC, discharge planning, evaluation  REQUIRES OT FOLLOW UP: Yes  Activity Tolerance  Activity Tolerance: Patient Tolerated treatment well  Safety Devices  Safety Devices in place: Yes  Type of devices: All fall risk precautions in place; Left in chair;Call light within reach;Gait belt;Nurse notified(no alarm per RN)         Patient Diagnosis(es): The primary encounter diagnosis was Gastrointestinal hemorrhage, unspecified gastrointestinal hemorrhage type. Diagnoses of Anticoagulated and Elevated INR were also pertinent to this visit. 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    has a past surgical history that includes Thyroidectomy, partial (10/04). Restrictions  Restrictions/Precautions  Restrictions/Precautions: Fall Risk(high fall risk)  Position Activity Restriction  Other position/activity restrictions: Natalie Yeung is a 80 y.o. female who presents to the emergency department fatigue, dark stools. She denies any known abdominal pain. She is on Coumadin and is concerned about GI bleed. Denies chest pain, chest pressure shortness of breath, dizziness or near syncope. Lucid been ongoing over the past days. Moderate, no known modifying factors   Subjective   General  Chart Reviewed: Yes  Diagnosis: melena  Subjective  Subjective: Pt supine in bed at time of therapist arrival. Pleasant and agreeable to tx  Pain Assessment  Pain Assessment: 0-10  Pain Level: 6  Vital Signs  Patient Currently in Pain: Yes     Orientation  Orientation  Overall Orientation Status: Within Functional Limits     Objective    ADL  Grooming: Stand by assistance;Setup(oral/hand hygiene in stance at sink)  LE Dressing: Minimal assistance(threading undergarments)  Toileting: Setup;Contact guard assistance(Voiding into toilet;CGA for balance with clothing mgmt)        Balance  Sitting Balance: Stand by assistance  Standing Balance: Contact guard assistance(SBA throughout, CGA for clothing mgmt s/p toileting)  Standing Balance  Sit to stand: Stand by assistance  Stand to sit: Stand by assistance  Functional Mobility  Functional - Mobility Device: Rolling Walker  Activity: Other  Assist Level: Stand by assistance  Functional Mobility Comments: EOB > toilet > sink > EOB > sink > recliner  Bed mobility  Supine to Sit: Stand by assistance  Scooting: Stand by assistance  Comment: increased time required  Transfers  Sit to stand: Stand by assistance  Stand to sit: Stand by assistance            Cognition  Overall Cognitive Status: Exceptions  Arousal/Alertness: Appropriate responses to stimuli  Following Commands:  Follows multistep commands with increased time  Attention Span: Appears intact; Attends with cues to redirect  Sequencing: Requires cues for some               Plan   Plan  Times per week: 3-5  Times per day: Daily  G-Code     OutComes Score                                                  AM-PAC Score        AM-PAC Inpatient Daily Activity Raw Score: 19  AM-PAC Inpatient ADL T-Scale Score : 40.22  ADL Inpatient CMS 0-100% Score: 42.8  ADL Inpatient CMS G-Code Modifier : CK    Goals  Short term goals  Time Frame for Short term goals: discharge  Short term goal 1: Fxl mob for ADL mod I - SBA 4/15  Short term goal 2: Fxl transfers for ADL mod I - SBA 4/15  Short term goal 3: UB/LB bathing mod I - not addressed 4/15  Short term goal 4: UB/LB dressing mod I - min A 4/15  Short term goal 5:  Toileting mod I - CGA 4/15  Long term goals  Time Frame for Long term goals : LTG=STG       Therapy Time   Individual Concurrent Group Co-treatment   Time In 1504         Time Out 1547         Minutes 43            Timed Code Treatment Minutes:  43 minutes    Total Treatment Minutes:  43 minutes    GREG Resendiz OTR/L [id number]    Vinh Tong OT

## 2019-04-15 NOTE — PLAN OF CARE
Problem: Falls - Risk of:  Goal: Will remain free from falls  Description  Will remain free from falls  4/14/2019 2008 by Karolina Stevenson, RN  Note:   Fall precautions remain in place.  YOBANY KEDAR HOWE    4/14/2019 1208 by Ange Tavera RN  Outcome: Ongoing

## 2019-04-15 NOTE — PROGRESS NOTES
Vitals:    04/15/19 0830   BP: (!) 96/59   Pulse: 64   Resp: 16   Temp: 97.6 °F (36.4 °C)   SpO2: 98%                         AM assessment completed, pt is A&Ox4, able to make needs and wants known with broken english. Requires SBA for ambulation, BLE weak but gait is slow and steady, uses call light approprietly for assistance. No s/s of abnormal bleeding at this time. Denies any N/V or loose stools. Pt denies chest pain or SOA. Sats are stable on RA. Lung sounds are diminished through out. POC reviewed and pt is agreeable to IV Protonix resuming coumadin at 5 mg and will monitor INR's. Fall precautions in place for safety, bed is in low position and wheels are locked. Pt is wearing nonskid sock for safety Call light in reach for wants and needs, will monitor.

## 2019-04-15 NOTE — PROGRESS NOTES
Physical Therapy  Facility/Department: [facility] 3A NURSING  Daily Treatment Note  NAME: Rosa Valente  : 1932  MRN: [id number]    Date of Service: 4/15/2019    Discharge Recommendations:Armaan Barker scored a 17/24 on the AM-PAC short mobility form. Current research shows that an AM-PAC score of 17 or less is typically not associated with a discharge to the patient's home setting. Based on the patients AM-PAC score and their current functional mobility deficits, it is recommended that the patient have 3-5 sessions per week of Physical Therapy at d/c to increase the patients independence. PT Equipment Recommendations  Equipment Needed: No    Patient Diagnosis(es): The primary encounter diagnosis was Gastrointestinal hemorrhage, unspecified gastrointestinal hemorrhage type. Diagnoses of Anticoagulated and Elevated INR were also pertinent to this visit. 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 has a past surgical history that includes Thyroidectomy, partial (10/04). Restrictions  Restrictions/Precautions  Restrictions/Precautions: Fall Risk(high fall risk)  Position Activity Restriction  Other position/activity restrictions: Rosa Valente is a 80 y.o. female who presents to the emergency department fatigue, dark stools. She denies any known abdominal pain. She is on Coumadin and is concerned about GI bleed. Denies chest pain, chest pressure shortness of breath, dizziness or near syncope. Lucid been ongoing over the past days. Moderate, no known modifying factors   Subjective   General  Chart Reviewed: Yes  Response To Previous Treatment: Patient with no complaints from previous session.   Family / Caregiver Present: No  Subjective  Subjective: Pt denies pain at rest; does have mild left low back pain with mobility, but able to continue. Pt likes to be called \"[place]\"  General Comment  Comments: Pt seated in chair upon arrival  Pain Screening  Patient Currently in Pain: Denies  Vital Signs  Patient Currently in Pain: Denies       Orientation  Orientation  Overall Orientation Status: Within Functional Limits     Objective      Transfers  Sit to Stand: Stand by assistance(from chair, toilet)  Stand to sit: Stand by assistance(chair, toilet)  Ambulation  Ambulation?: Yes  Ambulation 1  Surface: level tile  Device: Rolling Walker  Assistance: Stand by assistance  Quality of Gait: flexed trunk, decreased step length and height. Distance: 210 ft  Comments: Pt amb from chair to bathroom, and then into the hallway     Balance  Posture: Fair(flexed posture)  Sitting - Static: Good  Sitting - Dynamic: Good  Standing - Static: Good  Standing - Dynamic: Fair  Comments: Pt stood in bathroom to pull up pants with min A due to large size of pants and gown  Exercises  Comments: Pt instructed in APs, LAQ, seated marches          Assessment   Body structures, Functions, Activity limitations: Decreased functional mobility ; Decreased ADL status; Decreased strength;Decreased endurance;Decreased balance  Assessment: Pt with decreased functional mobility, decreased strength, balance, endurance. Pt does not feel safe to return home alone at this time. Multiple stairs in home. Pt depending heavily on neighbors help when feeling well. They are not able to provide for her needs currently. Pt needing skilled PT services to address these issues. Treatment Diagnosis: weakness, difficulty with gait. Prognosis: Good  Patient Education: PT POC, DC recommendations, RW recommendations.    REQUIRES PT FOLLOW UP: Yes  Activity Tolerance  Activity Tolerance: Patient Tolerated treatment well       AM-PAC Score  AM-PAC Inpatient Mobility Raw Score : 17  AM-PAC Inpatient T-Scale Score : 42.13  Mobility Inpatient CMS 0-100% Score: 50.57  Mobility Inpatient CMS G-Code Modifier : CK          Goals  Short term goals  Time Frame for Short term goals: To be met by DC. Short term goal 1: Pt to perform bed mob with mod I. Short term goal 2: Pt to perform transfers with mod I. Short term goal 3: Pt to perform amb with RW and supervision for 150 ft. Short term goal 4: Pt to amb up/down flight of steps with SBA. Long term goals  Time Frame for Long term goals : LTGs=STGs  Patient Goals   Patient goals : safe with walking, to feel stronger  No goals met    Plan    Plan  Times per week: 3-5x/week  Times per day: Daily  Current Treatment Recommendations: Strengthening, Balance Training, Functional Mobility Training, Transfer Training, Endurance Training, Stair training, Gait Training, Patient/Caregiver Education & Training, Equipment Evaluation, Education, & procurement, Home Exercise Program, Safety Education & Training  Safety Devices  Type of devices:  All fall risk precautions in place, Call light within reach, Chair alarm in place, Gait belt, Patient at risk for falls, Left in chair, Nurse notified(LANI Larios)  Restraints  Initially in place: No     Therapy Time   Individual Concurrent Group Co-treatment   Time In 1020         Time Out 1044         Minutes 24         Timed Code Treatment Minutes: [address] [facility]

## 2019-04-15 NOTE — PROGRESS NOTES
Sitting up in the chair at the bedside, pt is alert and oriented and denies pain at this time. Assessment done, VSS, call light in reach and chair alarm in place, will continue to monitor.

## 2019-04-15 NOTE — PROGRESS NOTES
Hospital Medicine Progress Note     Date:  4/15/2019    PCP: Xavier Castillo MD (Tel: [phone])    Date of Admission: 4/11/2019      Brief hospital course: 80 y.o. female with history of afib on Coumadin, hypothyroidism, HTN, hyperlipidemia, anxiety, allergic rhinitis, depression who came to ER with complaints of melena. INR > 6 in ED and occult positive. Admitted as inpatient for further management. Started on Protonix gtt. Given Vit K SQ and 2 U FFP on 4/11. INR 2.9 on 4/12. Pt GI evaluated and recommended outpt EGD & Colonoscopy. Subjective  No new complaints. Objective  Physical exam:  Vitals: BP (!) 96/59   Pulse 64   Temp 97.6 °F (36.4 °C)   Resp 16   Ht 5' 2\" (1.575 m)   Wt 103 lb 6.4 oz (46.9 kg)   LMP 09/23/1983   SpO2 98%   BMI 18.91 kg/m²   Gen: Not in distress. Alert. Head: Normocephalic. Atraumatic. Eyes: EOMI. Good acuity. ENT: Oral mucosa moist  Neck: No JVD. No obvious thyromegaly. CVS: Nml S1S2, no MRG, RRR  Pulmomary: Clear bilaterally. No crackles. No wheezes. Gastrointestinal: Soft, NT/ND. Positive bowel sounds. Musculoskeletal: No edema. Warm  Neuro: No focal deficit. Moves extremity spontaneously. Psychiatry: Appropriate affect. Not agitated. Skin: Warm, dry with normal turgor. No rash      24HR INTAKE/OUTPUT:      Intake/Output Summary (Last 24 hours) at 4/15/2019 0859  Last data filed at 4/14/2019 1200  Gross per 24 hour   Intake 240 ml   Output --   Net 240 ml     I/O last 3 completed shifts: In: 480 [P.O.:480]  Out: -   No intake/output data recorded.       Meds:    warfarin  5 mg Oral Daily    pantoprazole  40 mg Intravenous BID    levothyroxine  100 mcg Oral QAM AC    sodium chloride  250 mL Intravenous Once    calcium-vitamin D  2 tablet Oral Daily    sodium chloride flush  10 mL Intravenous 2 times per day       Infusions:       PRN Meds: acetaminophen, sodium chloride flush, magnesium hydroxide, ondansetron, potassium chloride **OR** potassium alternative oral replacement **OR** potassium chloride, potassium chloride    Labs/imaging:  CBC:   Recent Labs     04/13/19  0805 04/13/19  1939 04/14/19  0835 04/15/19  0522   WBC 7.1  --   --  5.2   HGB 10.0* 9.0* 9.5* 8.8*     --   --  164         BMP:    Recent Labs     04/13/19  0805 04/15/19  0522    142   K 4.0 3.7    106   CO2 26 27   BUN 11 16   CREATININE 0.8 0.9   GLUCOSE 103* 94         Hepatic:   No results for input(s): AST, ALT, ALB, BILITOT, ALKPHOS in the last 72 hours. Troponin: No results for input(s): TROPONINI in the last 72 hours. BNP: No results for input(s): BNP in the last 72 hours. INR:   Recent Labs     04/13/19  1134 04/14/19  0835 04/15/19  0522   INR 1.49* 1.48* 2.04*           Reviewed imaging and reports noted      Assessment:  Principal Problem:    Melena  Active Problems:    Hypercholesterolemia    Atrial fibrillation    Essential hypertension    Hypothyroidism    Long term current use of anticoagulant therapy    Anxiety state    Depression    Allergic rhinitis, seasonal    Anemia    Warfarin-induced coagulopathy ([facility] [address]  Resolved Problems:    * No resolved hospital problems. *        Plan:  Melena  -GI evaluated and S/O, recommended outpt EGD & colonoscopy  - HgB has remained stable, monitor H&h Q12hrs  - cont PPI PO      Chronic A. Fib  - rate controlled w/o meds,  on warfarin for AC ([id number] 4)       Hypothyroidism  - cont synthroid      Anemia  - HgB at baseline, has gotten 3 doses of IV Venofer, CTM      DIspo: Cleared for DC, SNF precert in process and hope to obtain by [facility]. Diet: DIET GENERAL;     Activity: up with assist  Prophylaxis: coumadin/SCD     Code status: Full Code     ----------        Haim Reyes MD  -------------------------------  Rounding hospitalist

## 2019-04-15 NOTE — CARE COORDINATION
GLENN spoke with Kasey Montanez, with admissions at [facility] who reports ability to accept pt, pending [employer] pre-cert. Updated PT/OT needed. GLENN sent skype to Javid Mccoy supervisor.     Marques MARCUM, [address]

## 2019-04-16 NOTE — PROGRESS NOTES
When pt walked to the BR this am found her IV in the bed with her. When told Pt I would have to restart it she said \"no\".  Will see what labs show this am

## 2019-04-16 NOTE — CARE COORDINATION
SW spoke with Katelynn Kim at [facility] who states pre-cert still pending for admission.     Mac Paiz MSW, [address]

## 2019-04-16 NOTE — PROGRESS NOTES
Physical Therapy  Facility/Department: [address] NURSING  Daily Treatment Note  NAME: Getachew Costello  : 1932  MRN: [id number]    Date of Service: 2019    Discharge Recommendations: Getachew Costello scored a 18/24 on the AM-PAC short mobility form. Current research shows that an AM-PAC score of 17 or less is typically not associated with a discharge to the patient's home setting. Patient lives alone in a multi-level home and must climb stairs for the bathroom and bedroom. Patient is not currently safe to return to prior setting and follow-up rehab services are recommended to regain strength and balance. Based on the patients AM-PAC score and their current functional mobility deficits, it is recommended that the patient have 3-5 sessions per week of Physical Therapy at d/c to increase the patients independence. PT Equipment Recommendations  Equipment Needed: No    Patient Diagnosis(es): The primary encounter diagnosis was Gastrointestinal hemorrhage, unspecified gastrointestinal hemorrhage type. Diagnoses of Anticoagulated, Elevated INR, and Persistent atrial fibrillation ([address] were also pertinent to this visit. 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 has a past surgical history that includes Thyroidectomy, partial (10/04). Restrictions  Restrictions/Precautions  Restrictions/Precautions: Fall Risk(high fall risk)  Position Activity Restriction  Other position/activity restrictions: Getachew Costello is a 80 y.o. female who presents to the emergency department fatigue, dark stools. She denies any known abdominal pain. She is on Coumadin and is concerned about GI bleed. Denies chest pain, chest pressure shortness of breath, dizziness or near syncope. Lucid been ongoing over the past days.   Moderate, no known modifying factors guard assistance  Comment: VCs for use of cane. Pt advised to walk with non-reciprocal pattern. Balance  Posture: Fair(thoracic kyphosis, forward flexion)  Sitting - Static: Good  Sitting - Dynamic: Good  Standing - Static: Good(able to stand >1 minute w/out UE support for management of clothing and gait belt. )  Standing - Dynamic: Fair; +(Patient able to complete 2 360 degree turns )    Sit to/from stand x5 reps with supervision. Assessment   Body structures, Functions, Activity limitations: Decreased functional mobility ; Decreased ADL status; Decreased strength;Decreased endurance;Decreased balance  Assessment: Pt with decreased functional mobility, decreased strength, balance, endurance. Pt does not feel safe to return home alone at this time. Multiple stairs in home. Pt depending heavily on neighbors help when feeling well. They are not able to provide for her needs currently. Pt is highly motivated and can benefit from continued skilled PT intervention. Prognosis: Good  Patient Education: PT POC, DC recommendations, RW recommendations. REQUIRES PT FOLLOW UP: Yes  Activity Tolerance  Activity Tolerance: Patient Tolerated treatment well     AM-PAC Score  AM-PAC Inpatient Mobility Raw Score : 18  AM-PAC Inpatient T-Scale Score : 43.63  Mobility Inpatient CMS 0-100% Score: 46.58  Mobility Inpatient CMS G-Code Modifier : CK    Goals  Short term goals  Time Frame for Short term goals: To be met by DC. Short term goal 1: Pt to perform bed mob with mod I. Short term goal 2: Pt to perform transfers with mod I. Short term goal 3: Pt to perform amb with RW and supervision for 150 ft. Short term goal 4: Pt to amb up/down flight of steps with SBA.    Long term goals  Time Frame for Long term goals : LTGs=STGs  Patient Goals   Patient goals : safe with walking, to feel stronger   *no goals met    Plan    Plan  Times per week: 3-5x/week  Times per day: Daily  Current Treatment Recommendations: Strengthening, Balance Training, Functional Mobility Training, Transfer Training, Endurance Training, Stair training, Gait Training, Patient/Caregiver Education & Training, Equipment Evaluation, Education, & procurement, Home Exercise Program, Safety Education & Training  Safety Devices  Type of devices: All fall risk precautions in place, Call light within reach, Chair alarm in place, Patient at risk for falls, Left in chair, Nurse notified  Restraints  Initially in place: No     Therapy Time   Individual Concurrent Group Co-treatment   Time In 1309         Time Out 1341         Minutes 32          Timed minutes:32  Total minutes: 201 [address] [place] [address], [facility]   Dalton Sanches PT    I agree with the above note. PT directly observed the SPT with the patient.   Echo Milner [place] DPT [id number]

## 2019-04-16 NOTE — CARE COORDINATION
SW spoke Shabana Gómez at [facility], who states pre-cert still pending. Update provided to pt's niece. SW to follow.     Vance Cuevas MSW, [address]

## 2019-04-16 NOTE — PROGRESS NOTES
Pharmacy to dose warfarin:    INR- 3.76, big jump from yesterday, will hold warfarin for now. Placeholder ordered. D/w Dr. Franca Meyer as pt likely going to facility soon- rec resuming at warf 2.5 mg daily once INR back within goal range of 2-3. Will continue to monitor daily and adjust as needed. Thank you for allowing us to participate in the care of this patient.     Manuel Scott, PharmD, BCPS   x [phone]

## 2019-04-16 NOTE — PROGRESS NOTES
Shift assessment completed. VSS. Pt A&O x4. Pt assisted up to chair, chair alarm on. Denies pain or other needs at this time. POC reviewed, all questions answered. Personal belongings and call light within reach. Will continue to monitor.

## 2019-04-16 NOTE — PROGRESS NOTES
Hospital Medicine Progress Note     Date:  4/16/2019    PCP: Cassidy Camejo MD (Tel: [phone])    Date of Admission: 4/11/2019      Brief hospital course: 80 y.o. female with history of afib on Coumadin, hypothyroidism, HTN, hyperlipidemia, anxiety, allergic rhinitis, depression who came to ER with complaints of melena. INR > 6 in ED and occult positive. Admitted as inpatient for further management. Started on Protonix gtt. Given Vit K SQ and 2 U FFP on 4/11. INR 2.9 on 4/12. Pt GI evaluated and recommended outpt EGD & Colonoscopy. Subjective  No new complaints. Objective  Physical exam:  Vitals: BP (!) 112/55   Pulse 63   Temp 97.3 °F (36.3 °C) (Temporal)   Resp 16   Ht 5' 2\" (1.575 m)   Wt 103 lb 6.4 oz (46.9 kg)   LMP 09/23/1983   SpO2 98%   BMI 18.91 kg/m²   Gen: Not in distress. Alert. Head: Normocephalic. Atraumatic. Eyes: EOMI. Good acuity. ENT: Oral mucosa moist  Neck: No JVD. No obvious thyromegaly. CVS: Nml S1S2, no MRG, RRR  Pulmomary: Clear bilaterally. No crackles. No wheezes. Gastrointestinal: Soft, NT/ND. Positive bowel sounds. Musculoskeletal: No edema. Warm  Neuro: No focal deficit. Moves extremity spontaneously. Psychiatry: Appropriate affect. Not agitated. Skin: Warm, dry with normal turgor. No rash      24HR INTAKE/OUTPUT:      Intake/Output Summary (Last 24 hours) at 4/16/2019 0904  Last data filed at 4/16/2019 0825  Gross per 24 hour   Intake 240 ml   Output --   Net 240 ml     No intake/output data recorded.   I/O this shift:  In: 240 [P.O.:240]  Out: -       Meds:    pantoprazole  40 mg Oral QAM AC    warfarin  5 mg Oral Daily    levothyroxine  100 mcg Oral QAM AC    sodium chloride  250 mL Intravenous Once    calcium-vitamin D  2 tablet Oral Daily    sodium chloride flush  10 mL Intravenous 2 times per day       Infusions:       PRN Meds: polyvinyl alcohol, acetaminophen, sodium chloride flush, magnesium hydroxide, ondansetron, potassium chloride **OR** potassium alternative oral replacement **OR** potassium chloride, potassium chloride    Labs/imaging:  CBC:   Recent Labs     04/15/19  0522 04/15/19  1202 04/15/19  1736   WBC 5.2  --   --    HGB 8.8* 9.1* 9.7*     --   --          BMP:    Recent Labs     04/15/19  0522      K 3.7      CO2 27   BUN 16   CREATININE 0.9   GLUCOSE 94         Hepatic:   No results for input(s): AST, ALT, ALB, BILITOT, ALKPHOS in the last 72 hours. Troponin: No results for input(s): TROPONINI in the last 72 hours. BNP: No results for input(s): BNP in the last 72 hours. INR:   Recent Labs     04/14/19  0835 04/15/19  0522 04/16/19  0517   INR 1.48* 2.04* 3.76*           Reviewed imaging and reports noted      Assessment:  Principal Problem:    Melena  Active Problems:    Hypercholesterolemia    Atrial fibrillation    Essential hypertension    Hypothyroidism    Long term current use of anticoagulant therapy    Anxiety state    Depression    Allergic rhinitis, seasonal    Anemia    Warfarin-induced coagulopathy ([facility] [address]  Resolved Problems:    * No resolved hospital problems. *        Plan:  Melena  -GI evaluated and S/O, recommended outpt EGD & colonoscopy  - HgB has remained stable, monitor H&h Q12hrs  - cont PPI PO      Chronic A. Fib  - rate controlled w/o meds,  ([id number] 4) warfarin on hold as INR 3.76, restart Warffarin when INR < 3 at 2.5mg PO QD      Hypothyroidism  - cont synthroid      Anemia  - HgB at baseline, has gotten 3 doses of IV Venofer, CTM      DIspo: Cleared for DC, waiting precert for SNF. Diet: DIET GENERAL;     Activity: up with assist  Prophylaxis: coumadin/SCD     Code status: Full Code     ----------        Mia Acharya MD  -------------------------------  Rounding hospitalist

## 2019-04-16 NOTE — PLAN OF CARE
Problem: Falls - Risk of:  Goal: Will remain free from falls  Description  Will remain free from falls  4/16/2019 0903 by Minh Liang RN  Outcome: Ongoing  Note:   Fall risk precautions in place. Bed wheels locked and in lowest position. Pt up in chair, chair alarm on. Non-skid footwear applied. Instructed pt to call with needs or for assistance. Expressed understanding. Call light and personal belongings within reach. Will continue to monitor. Problem: Bleeding:  Goal: Will show no signs and symptoms of excessive bleeding  Description  Will show no signs and symptoms of excessive bleeding  Outcome: Ongoing  Note:   No signs of active bleeding noted at this time. Pt has soft brown bowel movement this AM.  Will continue to monitor.

## 2019-04-16 NOTE — PROGRESS NOTES
Pt assisted to the restroom where she voided and then assisted back to bed, pt tolerated fairly well. Assessment done, VSS, call light in reach and bed alarm on, will continue to monitor.

## 2019-04-17 NOTE — PROGRESS NOTES
Hospital Medicine Progress Note     Date:  4/17/2019    PCP: Fly Rodriguez MD (Tel: [phone])    Date of Admission: 4/11/2019      Brief hospital course: 80 y.o. female with history of afib on Coumadin, hypothyroidism, HTN, hyperlipidemia, anxiety, allergic rhinitis, depression who came to ER with complaints of melena. INR > 6 in ED and occult positive. Admitted as inpatient for further management. Started on Protonix gtt. Given Vit K SQ and 2 U FFP on 4/11. INR 2.9 on 4/12. Pt GI evaluated and recommended outpt EGD & Colonoscopy. Subjective  No new complaints. Objective  Physical exam:  Vitals: BP (!) 93/54   Pulse 59   Temp 97.2 °F (36.2 °C) (Temporal)   Resp 16   Ht 5' 2\" (1.575 m)   Wt 103 lb 6.4 oz (46.9 kg)   LMP 09/23/1983   SpO2 98%   BMI 18.91 kg/m²   Gen: Not in distress. Alert. Head: Normocephalic. Atraumatic. Eyes: EOMI. Good acuity. ENT: Oral mucosa moist  Neck: No JVD. No obvious thyromegaly. CVS: Nml S1S2, no MRG, RRR  Pulmomary: Clear bilaterally. No crackles. No wheezes. Gastrointestinal: Soft, NT/ND. Positive bowel sounds. Musculoskeletal: No edema. Warm  Neuro: No focal deficit. Moves extremity spontaneously. Psychiatry: Appropriate affect. Not agitated. Skin: Warm, dry with normal turgor. No rash      24HR INTAKE/OUTPUT:      Intake/Output Summary (Last 24 hours) at 4/17/2019 1322  Last data filed at 4/17/2019 0808  Gross per 24 hour   Intake 480 ml   Output --   Net 480 ml     I/O last 3 completed shifts:   In: 720 [P.O.:720]  Out: -   I/O this shift:  In: 240 [P.O.:240]  Out: -       Meds:    warfarin (COUMADIN) daily dosing (placeholder)   Other RX Placeholder    pantoprazole  40 mg Oral QAM AC    levothyroxine  100 mcg Oral QAM AC    sodium chloride  250 mL Intravenous Once    calcium-vitamin D  2 tablet Oral Daily       Infusions:       PRN Meds: melatonin, polyvinyl alcohol, acetaminophen, magnesium hydroxide, ondansetron, potassium chloride **OR** potassium alternative oral replacement **OR** potassium chloride, potassium chloride    Labs/imaging:  CBC:   Recent Labs     04/15/19  0522 04/15/19  1202 04/15/19  1736   WBC 5.2  --   --    HGB 8.8* 9.1* 9.7*     --   --          BMP:    Recent Labs     04/15/19  0522      K 3.7      CO2 27   BUN 16   CREATININE 0.9   GLUCOSE 94         Hepatic:   No results for input(s): AST, ALT, ALB, BILITOT, ALKPHOS in the last 72 hours. Troponin: No results for input(s): TROPONINI in the last 72 hours. BNP: No results for input(s): BNP in the last 72 hours. INR:   Recent Labs     04/15/19  0522 04/16/19  0517 04/17/19  0824   INR 2.04* 3.76* 4.88*           Reviewed imaging and reports noted      Assessment:  Principal Problem:    Melena  Active Problems:    Hypercholesterolemia    Atrial fibrillation    Essential hypertension    Hypothyroidism    Long term current use of anticoagulant therapy    Anxiety state    Depression    Allergic rhinitis, seasonal    Anemia    Warfarin-induced coagulopathy ([facility] [address]  Resolved Problems:    * No resolved hospital problems. *        Plan:  Melena  -GI evaluated and S/O, recommended outpt EGD & colonoscopy  - HgB has remained stable, monitor H&h Q12hrs  - cont PPI PO      Chronic A. Fib  - rate controlled w/o meds,  ([id number] 4) warfarin on hold as INR 3.76, restart Warffarin when INR < 3 at 2.5mg PO QD      Hypothyroidism  - cont synthroid      Anemia  - HgB at baseline, has gotten 3 doses of IV Venofer, CTM      DIspo: Cleared for DC, pt rejected for SNF, will consult PM&R. Diet: DIET GENERAL;     Activity: up with assist  Prophylaxis: coumadin/SCD     Code status: Full Code     ----------        Erica Dupree MD  -------------------------------  Raman hospitalist

## 2019-04-17 NOTE — PROGRESS NOTES
Shift assessment completed. VSS. Pt A&O x4. Pt assisted to bathroom and then up to chair. Chair alarm on. POC reviewed, all questions answered. Personal belongings and call light within reach. Will continue to monitor.

## 2019-04-17 NOTE — PROGRESS NOTES
Data- discharge order received, pt verbalized agreement to discharge, needs for [facility] with [facility] for PT/OT, skilled nursing, and social work, [address] reviewed and signed by MD, to be completed by RN. Action- AVS prepared, discharge instructions prepared and given to pt, medication information packet given r/t NEW or CHANGED prescriptions, pt verbalized understanding further self-review. D/C instruction summary: Diet- general, Activity- as tolerated, follow up with Primary Care Physician Lennie Aranda [phone] pt instructed to scheduled f/u appointment in 1 week, immunizations reviewed, medications prescriptions to be filled: n/a. Inpatient surgical procedural reviewed: n/a. Contact information provided to above agencies used. Response- Case Management/ reported faxing completed PURNIMA and AVS to needed HHC/DME services stated above. Pt belongings gathered, IV removed, pt dressed. Disposition is home with HHC/DME as stated above, transported with family member and personal belongings, taken to lobby via w/c with PCA, no complications.

## 2019-04-17 NOTE — CARE COORDINATION
GLENN spoke with CM with [facility] [employer] who states pt case denied. GLENN spoke pt and family and pt wanting to go home with [facility]. Niece Eliza Saenz states the only barrier at home is pt's stairs, pt is mainly set up on one floor. SW and nikarissa discussed long term care planning and contacted [employer] for additional assistance in home and possible assisted living referrals. Nikarissa plans to sit down with pt tonight and discuss different options. She states she may be able to stay with pt overnight. SW enocuraged her to do so. Pt is agreeable to plan of home with [facility] for PT/OT/ skilled nursing and social work. .    Glenn spoke with Dr. Eris Acevedo who states he did not feel case would get approved but would have been willing to do peer to peer. Pt and family informed of this but pt wants to go home. Referral to [facility] for S3 model. Samuel Chavez with [facility] to follow. No other SW needs at this time.     Karsten Hernandez MSW, [address]

## 2019-04-17 NOTE — DISCHARGE SUMMARY
Hospital Discharge Summary    Patient's PCP: Karrie Sarah MD  Admit Date: 4/11/2019   Discharge Date: 4/17/2019    Admitting Physician: Dr. Chloe Robledo MD  Discharge Physician: Dr. Hobson So:   IP CONSULT TO HOSPITALIST  IP CONSULT TO GI  PHARMACY TO DOSE WARFARIN  IP CONSULT TO SOCIAL WORK  IP CONSULT TO SPIRITUAL SERVICES  IP CONSULT TO HOME CARE NEEDS    Brief HPI:   Kayli White is a 80 y.o. female with history of afib on Coumadin, hypothyroidism, HTN, hyperlipidemia, anxiety, allergic rhinitis, depression who came to ER with complaints of melena. States she has some lower abdominal pain. No diarrhea. No nausea or vomiting. No EtOH. No dysphagia or odynophagia. No hematochezia. No fevers, chills or NS. No travel. In ED, occult positive on rectal exam.  INR > 6 in ED. No CP, SOB, HA or syncope. No vaginal bleeding. Lives alone. Niece helps when needed. Does not drive. Otherwise complete ROS is negative unless listed above.     86 y.o. female with history of afib on Coumadin, hypothyroidism, HTN, hyperlipidemia, anxiety, allergic rhinitis, depression who came to ER with complaints of melena.  INR > 6 in ED and occult positive.  Admitted as inpatient for further management.  Started on Protonix gtt.  Given Vit K SQ and 2 U FFP on 4/11.  INR 2.9 on 4/12. Pt GI evaluated and recommended outpt EGD & Colonoscopy. Brief hospital course:   Melena  -GI evaluated and S/O, recommended outpt EGD & colonoscopy  - HgB has remained stable, monitor H&h Q12hrs  - cont PPI PO        Chronic A. Fib  - rate controlled w/o meds,  ([id number]) warfarin on hold as INR 3.76, restart Warffarin when INR < 3 at 2.5mg PO QD        Hypothyroidism  - cont synthroid        Anemia  - HgB at baseline, has gotten 3 doses of IV Venofer, CTM      Pt was denied both ARU & SNF, decided to go home with [facility], will look into Assisted living facilities,  home also consulted.  She will f/u w/ her PCP in 1 week. Discharge Diagnoses:   Principal Problem:    Melena  Active Problems:    Hypercholesterolemia    Atrial fibrillation    Essential hypertension    Hypothyroidism    Long term current use of anticoagulant therapy    Anxiety state    Depression    Allergic rhinitis, seasonal    Anemia    Warfarin-induced coagulopathy ([address]  Resolved Problems:    * No resolved hospital problems. *      Physical Exam: BP (!) 93/54   Pulse 59   Temp 97.2 °F (36.2 °C) (Temporal)   Resp 16   Ht 5' 2\" (1.575 m)   Wt 103 lb 6.4 oz (46.9 kg)   LMP 09/23/1983   SpO2 98%   BMI 18.91 kg/m²   Gen/overall appearance: Not in acute distress. Alert. Head: Normocephalic, atraumatic  Eyes: EOMI, good acuity  ENT:- Oral mucosa moist  Neck: No JVD, thyromegaly  CVS: Nml S1S2, no MRG, RRR  Pulm: Clear bilaterally. No crackles/wheezes  Gastrointestinal: Soft, NT/ND, +BS  Musculoskeletal: No edema. Warm  Neuro: No focal deficit. Moves extremity spontaneously. Psychiatry: Appropriate affect. Not agitated. Skin: Warm, dry with normal turgor. No rash        Significant diagnostic studies that may require follow up:  No results found. Treatments: As above.       Discharge Medications:     Medication List      CONTINUE taking these medications    acetaminophen 500 MG tablet  Commonly known as:  APAP EXTRA STRENGTH  Take 2 tablets by mouth every 6 hours as needed for Pain  Notes to patient:  Use: Fever, mild pain  Avoid if history of liver issues     calcium carbonate-vitamin D 600-400 MG-UNIT Tabs per tab  Commonly known as:  CALTRATE 600+D  Take 2 tablets by mouth daily  Notes to patient:  Use: prevention and treatment of low vitamin D  Side effects: muscle weakness/twitching fatigue, constipation, upset stomach     levothyroxine 100 MCG tablet  Commonly known as:  SYNTHROID  TAKE 1 TABLET EVERY DAY  Notes to patient:  Use: to treat low thyroid levels  Side effects: hair loss, chest pain, irregular heartbeat, irritability, insomnia      pantoprazole 40 MG tablet  Commonly known as:  PROTONIX  TAKE 1 TABLET EVERY DAY  Notes to patient:  Use: Prevention and treatment of gastric ulcers  Side Effects: Headache, fatigue, constipation, dry  mouth     polyethylene glycol powder  Commonly known as:  GLYCOLAX  DISSOLVE 17 GRAMS IN 8 OUNCES OF LIQUID AND DRINK ONCE A DAY  Notes to patient:  Use: Used for constipation and keeps stools soft  Side Effects: Stop taking if you have liquid stool        STOP taking these medications    warfarin 2.5 MG tablet  Commonly known as:  COUMADIN  Notes to patient:  **Check INR daily and restart warfarin at 2.5mg QD once INR < 3            Activity: activity as tolerated  Diet: DIET GENERAL;      Disposition: home  Discharged Condition: Stable  Follow Up:   Lance Mccain MD  [address] [address]  [phone]              Code status:  Full Code         Total time spent on discharge, finalizing medications, referrals and arranging outpatient follow up was more than 1 hour      Thank you Dr. Kimberly Simmons MD for the opportunity to be involved in this patients care.

## 2019-04-17 NOTE — PROGRESS NOTES
[facility] home care referral. Spoke with patient's niece and re: home care plan of care/services. Agreeable. Will follow for home care.

## 2019-04-18 NOTE — PROGRESS NOTES
Occupational Therapy  Occupational Therapy Discharge Summary    Name: Deb Gaviria  : 1932    The pt was evaluated by OT on  and seen for2 treatment sessions prior to DC to home on  per MD order. The pt's acute therapy goals were:  Short term goals  Time Frame for Short term goals: discharge  Short term goal 1: Fxl mob for ADL mod I - SBA 4/15  Short term goal 2: Fxl transfers for ADL mod I - SBA 4/15  Short term goal 3: UB/LB bathing mod I - not addressed 4/15  Short term goal 4: UB/LB dressing mod I - min A 4/15  Short term goal 5: Toileting mod I - CGA 4/15  Long term goals  Time Frame for Long term goals : LTG=STG     Patient met 0goals during stay. Number of Refusals0  Number of Holds: 0  During this hospitalization, the patient was educated on:  Patient Education: Role of OT, POC, discharge planning, evaluation    DC pt from OT caseload at this time. Thank you!

## 2019-04-26 NOTE — PROGRESS NOTES
Subjective:      Patient ID: Serene Street is a 80 y.o. female. 19 Patient presents with: Follow-Up from Hospital: GI bleed, Afib, Abnormal INR    Admit Date:    2019   Discharge Date: 2019    INR > 6 in ED and occult positive.  Admitted as inpatient for further management.  Started on Protonix gtt.  Given Vit K SQ and 2 U FFP on .  INR 2.9 on . Pt GI evaluated and recommended outpt EGD & Colonoscopy.               Last  Seen  19           Review of Systems   Constitutional: Positive for fatigue. Negative for activity change, fever and unexpected weight change. Pneumonia vac  ; prevnar   Td ap    Flu vac 10/18  Zostavac     HENT:        Partials   Eyes: Negative. Glasses ; Last eye ex   S/P Bilat cataract surgery    Respiratory: Negative for cough, chest tightness, shortness of breath and wheezing. Does not smoke ; No Etoh     Cardiovascular: Negative. No HTN / CAD . No FH either      H/O Atrial Fib > on Coumadin     Gastrointestinal: Negative. Negative for abdominal distention, abdominal pain, blood in stool, constipation and diarrhea. Genitourinary: Negative for dysuria, frequency, menstrual problem, urgency and vaginal discharge. Hysterectromy   One daughter  in accident at 23      Mammogram 9/15     Musculoskeletal: Negative. DEXA  9/15  ; osteoporosis     Neurological: Positive for tremors and weakness. Negative for dizziness. Psychiatric/Behavioral: Positive for dysphoric mood (better). Negative for behavioral problems and sleep disturbance. The patient is nervous/anxious (better). Objective:   Physical Exam   Constitutional: She is oriented to person, place, and time. Weight stable   Eyes: Conjunctivae are normal.   Neck: Neck supple. Cardiovascular: Normal rate, regular rhythm and normal heart sounds. Pulmonary/Chest: Breath sounds normal.   Abdominal: Soft.  She exhibits no distension. There is no tenderness. Musculoskeletal: Normal range of motion. Neurological: She is alert and oriented to person, place, and time. She has normal strength. She displays tremor. Skin: Skin is warm. Psychiatric: Her mood appears anxious. Vitals reviewed. Assessment:      Zondra Cabot was seen today for follow-up from hospital.    Diagnoses and all orders for this visit:    Hospital discharge follow-up  -     CBC Auto Differential; Future  -     PROTIME-INR; Future    Chronic atrial fibrillation (HCC) on coumadin 2.5 mg / d   -     CBC Auto Differential; Future  -     PROTIME-INR; Future    Gastrointestinal hemorrhage, unspecified gastrointestinal hemorrhage type  -     CBC Auto Differential; Future  -     PROTIME-INR; Future  -     AFL - Gomez Tian MD, Gastroenterology, [facility]  -     Jelani Jenkins MD, Gastroenterology, [facility]      Other iron deficiency anemia cannot tolerate Iron . Advised apple / day ; liver   -          Acquired hypothyroidism   Stable On Synthroid 100 mcg / d                   Age-related osteoporosis without current pathological fracture  off Fosamax + continues calccium            Plan:              Sadie Aguilar MD    Post-Discharge Transitional Care Management Services or Hospital Follow Up      Nguyen Sevilla   YOB: 1932    Date of Office Visit:  4/26/2019  Date of Hospital Admission: 4/11/19  Date of Hospital Discharge: 4/17/19  Risk of hospital readmission (high >=14%.  Medium >=10%) :Readmission Risk Score: 12      Care management risk score Rising risk (score 2-5) and Complex Care (Scores >=6): 5     Non face to face  following discharge, date last encounter closed (first attempt may have been earlier):     Call initiated 2 business days of discharge:     Patient Active Problem List   Diagnosis    Anxiety state    Depression    Goiter    Hypercholesterolemia    Atrial fibrillation    Essential tablets by mouth every 6 hours as needed for Pain 120 tablet 3        Medications patient taking as of now reconciled against medications ordered at time of hospital discharge: yes     Chief Complaint   Patient presents with    Follow-Up from Hospital     GI bleed, Afib, Abnormal INR       History of Present illness - as noted     Inpatient course: Discharge summary reviewed- see chart. Interval history/Current status        Vitals:    04/26/19 1115 04/26/19 1157   BP: 115/62 110/60   Site: Left Upper Arm    Position: Sitting    Cuff Size: Medium Adult    Pulse: 66    Temp: 97.3 °F (36.3 °C)    TempSrc: Oral    Weight: 107 lb (48.5 kg)      Body mass index is 19.57 kg/m².    Wt Readings from Last 3 Encounters:   04/26/19 107 lb (48.5 kg)   04/15/19 103 lb 6.4 oz (46.9 kg)   04/09/19 110 lb (49.9 kg)     BP Readings from Last 3 Encounters:   04/26/19 110/60   04/17/19 (!) 93/54   04/09/19 110/70        Physical Exam:  As noted     Assessment/Plan:  As noted above

## 2019-05-17 NOTE — TELEPHONE ENCOUNTER
Pt's niece(Aisha)  Wants a call back with pt;s most recent test results pls.  Thank you  Yamil: [phone]

## 2019-05-17 NOTE — TELEPHONE ENCOUNTER
Spoke to pt niece and she states that she received a call from a gentleman yesterday evening advising her to have pt not take her coumadin today, tomorrow and Sunday and then come in on Monday for repeat INR. Eliza Saenz would like to verify this but she is unsure of who she spoke to. Also she states that the pt in the past use to alternate between a whole and a half QOD is this something she can do again to avoid having the elevated INR?

## 2019-05-20 NOTE — TELEPHONE ENCOUNTER
States pt came in to get her INR checked today but they didn't get results. Wants to know if pt should take her coumadin this evening.

## 2019-05-21 NOTE — TELEPHONE ENCOUNTER
Even I didn't get results ! !    I just checked . INR is still up .  Hold Coumadin tonite     Then half dose tomorrow     Alternate half and full dose Coumadin after that     Recheck Coumadin next Tuesday

## 2019-06-13 PROBLEM — I48.91 ATRIAL FIBRILLATION WITH RVR (HCC): Status: RESOLVED | Noted: 2018-07-29 | Resolved: 2019-01-01

## 2019-06-13 NOTE — PROGRESS NOTES
[address]     Outpatient Follow Up Note    Addie Ruiz is 80 y.o. female who presents today with a history of AF, MR and HTN. CHIEF COMPLAINT / HPI:  Follow Up secondary to atrial fibrillation    Subjective:   she denies significant chest pain. Sometimes her heart rate is faster than others leaving her feel more fatigued. Walking makes her tired. There is NELSON and not necessarily related to her heart rate. Going up steps causes her to feel winded. Walking on level ground she has no problems breathing but looses her balance walking to briskly. The patient denies orthopnea/PND. The patient does not have swelling. The patients weight is down 5# / 6 months by office scales despite eating well balanced meals . The patient is not experiencing dizziness but light headed. These symptoms have worsened since the last OV in terms of feeling weaker. With regard to medication therapy the patient has been compliant with prescribed regimen. They have tolerated therapy to date.      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    Osteoporosis      Social History:    Social History     Tobacco Use   Smoking Status Never Smoker   Smokeless Tobacco Never Used   Tobacco Comment    avoid tobacco     Current Medications:  Current Outpatient Medications   Medication Sig Dispense Refill    warfarin (COUMADIN) 2 MG tablet Take 1 tablet by mouth daily 90 tablet 5    warfarin (COUMADIN) 1 MG tablet Take 1 tablet by mouth daily Coumadin 1 mg alt with 2 mg 90 tablet 3    pantoprazole (PROTONIX) 40 MG tablet TAKE 1 TABLET EVERY DAY 90 tablet 3    levothyroxine (SYNTHROID) 100 MCG tablet TAKE 1 TABLET EVERY DAY 90 tablet 3    calcium carbonate-vitamin D (CALTRATE 600+D) 600-400 MG-UNIT TABS per tab Take 2 tablets by mouth daily 180 tablet 5    acetaminophen (APAP EXTRA STRENGTH) 500 MG tablet Take 2 tablets by mouth every 6 hours as needed for Pain 120 tablet 3    polyethylene glycol (GLYCOLAX) powder DISSOLVE 17 GRAMS IN 8 OUNCES OF LIQUID AND DRINK ONCE A  g 0     No current facility-administered medications for this visit. REVIEW OF SYSTEMS:    CONSTITUTIONAL: No major weight gain or loss, + fatigue, + weakness, - night sweats or fever. HEENT: No new vision difficulties or ringing in the ears. RESPIRATORY: No new SOB, PND, orthopnea or cough. CARDIOVASCULAR: See HPI  GI: No nausea, vomiting, diarrhea, constipation, abdominal pain or changes in bowel habits. : No urinary frequency, urgency, incontinence hematuria or dysuria. SKIN: No cyanosis or skin lesions. MUSCULOSKELETAL: No new muscle or joint pain. NEUROLOGICAL: No syncope or TIA-like symptoms. PSYCHIATRIC: No anxiety, pain, insomnia or depression    Objective:   PHYSICAL EXAM:       Vitals:    06/13/19 1001 06/13/19 1040   BP: 134/64 128/62   Site: Right Upper Arm    Position: Sitting    Cuff Size: Medium Adult    Pulse: 61    SpO2: 98%    Weight: 106 lb (48.1 kg)    Height: 5' 4\" (1.626 m)         VITALS:  /64 (Site: Right Upper Arm, Position: Sitting, Cuff Size: Medium Adult)   Pulse 61   Ht 5' 4\" (1.626 m)   Wt 106 lb (48.1 kg)   LMP 09/23/1983   SpO2 98%   BMI 18.19 kg/m²   CONSTITUTIONAL: Cooperative, no apparent distress, and appears well nourished / developed  NEUROLOGIC:  Awake and orientated to person, place and time. PSYCH: Calm affect. SKIN: Warm and dry. HEENT: Sclera non-icteric, normocephalic, neck supple, no elevation of JVP, normal carotid pulses with no bruits and thyroid normal size. LUNGS:  No increased work of breathing and clear to auscultation, no crackles or wheezing  CARDIOVASCULAR:  IRRegular rate 72 and rhythm with + murmur 4th ICS Lt MCL, gallops, rubs, or abnormal heart sounds, normal PMI. The apical impulses not displaced  JVP less than 8 cm H2O  Heart tones are crisp and normal  Cervical veins are not engorged  The carotid upstroke is normal in amplitude and contour without delay or bruit  JVP is not elevated  ABDOMEN:  Normal bowel sounds, non-distended and non-tender to palpation  EXT: No edema, no calf tenderness. Pulses are present bilaterally. DATA:    Lab Results   Component Value Date    ALT 12 04/11/2019    AST 28 04/11/2019    ALKPHOS 52 04/11/2019    BILITOT 0.5 04/11/2019     Lab Results   Component Value Date    CREATININE 0.9 04/15/2019    BUN 16 04/15/2019     04/15/2019    K 3.7 04/15/2019     04/15/2019    CO2 27 04/15/2019     Lab Results   Component Value Date    TSH 2.09 04/09/2019     Lab Results   Component Value Date    WBC 4.5 04/26/2019    HGB 10.4 (L) 04/26/2019    HCT 33.3 (L) 04/26/2019    MCV 74.3 (L) 04/26/2019     04/26/2019     No components found for: CHLPL  Lab Results   Component Value Date    TRIG 66 01/17/2019    TRIG 80 04/18/2017    TRIG 93 08/09/2016     Lab Results   Component Value Date    HDL 80 (H) 01/17/2019    HDL 81 (H) 04/18/2017    HDL 65 (H) 08/09/2016     Lab Results   Component Value Date    LDLCALC 76 01/17/2019    LDLCALC 84 04/18/2017    LDLCALC 96 08/09/2016     Lab Results   Component Value Date    LABVLDL 13 01/17/2019    LABVLDL 16 04/18/2017    LABVLDL 19 08/09/2016     Lab Results   Component Value Date    INR 2.46 (H) 06/06/2019    INR 3.61 (H) 05/28/2019    INR 3.38 (H) 05/20/2019    PROTIME 28.1 (H) 06/06/2019    PROTIME 41.2 (H) 05/28/2019    PROTIME 38.5 (H) 05/20/2019     Radiology Review:  Pertinent images / reports were reviewed as a part of this visit and reveals the following:    MCOT: 10/2/18-10/29/18  Average HR 65, Low HR 50, Highest   Afib Kaufman 100%, PVC Kaufman 1%, PAC Kaufman <1%     Echo: July '18:  Summary   -Normal left ventricle size, wall thickness and systolic function with an   estimated ejection fraction of 55-60%.    -No verbalizes understanding not to stop medications without discussing with us. Discussed exercise  Discussed diet. Thank you for allowing to us to participate in the care of Kimble Boeck.     FARHANA Pickering    Documentation of today's visit sent to PCP

## 2019-07-08 NOTE — TELEPHONE ENCOUNTER
Pt's well[employer] calling to get a work in ap pt this Tuesday after 4 pm  Wednesday or after 3 pm  with Vernon Fregoso MD     Reason : swollen ankle x 1 week     Please call.  Advise

## 2019-08-01 NOTE — PROGRESS NOTES
Subjective:      Patient ID: Miguelina Tiwari is a 80 y.o. female. 19 Patient presents with:  Check-Up: feeling better over all     No falls  No Depression     Does some house work a little at a time             Last seen  19 Patient presents with: Follow-Up from Hospital: GI bleed, Afib, Abnormal INR    Admit Date:    2019   Discharge Date: 2019    INR > 6 in ED and occult positive.  Admitted as inpatient for further management.  Started on Protonix gtt.  Given Vit K SQ and 2 U FFP on .  INR 2.9 on . Pt GI evaluated and recommended outpt EGD & Colonoscopy.               Last  Seen  19         Ankle Pain          Review of Systems   Constitutional: Negative for activity change, fever and unexpected weight change. Pneumonia vac    Td ap    Flu vac 10/18  Zostavac     HENT:        Partials   Eyes: Negative. Glasses ; Last eye ex   S/P Bilat cataract surgery    Respiratory: Negative for cough, chest tightness, shortness of breath and wheezing. Does not smoke ; No Etoh     Cardiovascular: Negative. No HTN / CAD . No FH either      H/O Atrial Fib > on Coumadin     Gastrointestinal: Negative. Negative for abdominal distention, abdominal pain, blood in stool, constipation and diarrhea. Genitourinary: Negative for dysuria, frequency, menstrual problem, urgency and vaginal discharge. Hysterectromy   One daughter  in accident at 23      Mammogram 9/15     Musculoskeletal: Positive for arthralgias and gait problem. DEXA  9/15  ; osteoporosis     Neurological: Positive for tremors and weakness. Negative for dizziness. Psychiatric/Behavioral: Positive for dysphoric mood (better). Negative for behavioral problems and sleep disturbance. The patient is nervous/anxious (better). Objective:   Physical Exam   Constitutional: She is oriented to person, place, and time. Picked up wt!    Eyes: Conjunctivae and EOM

## 2019-08-22 PROBLEM — G25.2 RESTING TREMOR: Status: RESOLVED | Noted: 2017-03-08 | Resolved: 2019-01-01

## 2019-08-22 PROBLEM — B37.31 VULVOVAGINAL CANDIDIASIS: Status: RESOLVED | Noted: 2019-01-01 | Resolved: 2019-01-01

## 2019-08-22 PROBLEM — R21 RASH: Status: RESOLVED | Noted: 2019-01-01 | Resolved: 2019-01-01

## 2019-08-22 PROBLEM — N30.01 ACUTE CYSTITIS WITH HEMATURIA: Status: RESOLVED | Noted: 2017-03-08 | Resolved: 2019-01-01

## 2019-09-05 NOTE — PROGRESS NOTES
nervous/anxious (better). Objective:   Physical Exam   Constitutional: She is oriented to person, place, and time. Picked up wt! Eyes: Conjunctivae and EOM are normal.   Neck: Neck supple. Cardiovascular: Normal rate, regular rhythm and normal heart sounds. Pulmonary/Chest: Breath sounds normal.   Abdominal: Soft. She exhibits no distension. Musculoskeletal: Normal range of motion. Neurological: She is alert and oriented to person, place, and time. She has normal strength. She displays tremor. Skin: Skin is warm. Psychiatric: Her mood appears anxious. Thought content is paranoid. She exhibits a depressed mood. Vitals reviewed. Assessment:        Nirmal King was seen today for anxiety, tremors and urinary frequency. Diagnoses and all orders for this visit:    Frequency of micturition  -     Urinalysis; Future    Acquired hypothyroidism on Synthroid 100 mcg / d   -     TSH without Reflex; Future    Tremor, unspecified  -     Sunni Rothman MD, Neurology, [facility]    Gait abnormality  -     Sunni Rothman MD, Neurology, [facility]    Anxiety and depression  Starting low dose Zoloft and if tolerating will advance dose   -     sertraline (ZOLOFT) 25 MG tablet; Take 1 tablet by mouth daily    Flu vaccine need  -     INFLUENZA, TRIV, INACTIVATED, SUBUNIT, ADJUVANTED, 65 YRS AND OLDER, IM, PREFILL SYR, 0.5ML (FLUAD TRIV)          Atrial fibrillation, unspecified type (HCC)  -     warfarin (COUMADIN) 2 MG tablet; Take 1 tablet by mouth daily  -     warfarin (COUMADIN) 1 MG tablet;  Take 1 tablet by mouth daily Coumadin 1 mg alt with 2 mg       Age-related osteoporosis without current pathological fracture  off Fosamax + continues calccium            Plan:

## 2019-11-21 PROBLEM — G20 PARKINSON'S DISEASE (HCC): Status: ACTIVE | Noted: 2019-01-01

## 2019-11-21 PROBLEM — G20.A1 PARKINSON'S DISEASE: Status: ACTIVE | Noted: 2019-01-01

## 2020-01-01 ENCOUNTER — TELEPHONE (OUTPATIENT)
Dept: PRIMARY CARE CLINIC | Age: 85
End: 2020-01-01

## 2020-01-01 ENCOUNTER — APPOINTMENT (OUTPATIENT)
Dept: GENERAL RADIOLOGY | Age: 85
DRG: 956 | End: 2020-01-01
Payer: MEDICARE

## 2020-01-01 ENCOUNTER — OFFICE VISIT (OUTPATIENT)
Dept: PRIMARY CARE CLINIC | Age: 85
End: 2020-01-01
Payer: MEDICARE

## 2020-01-01 ENCOUNTER — HOSPITAL ENCOUNTER (INPATIENT)
Age: 85
LOS: 7 days | DRG: 956 | End: 2020-02-07
Attending: EMERGENCY MEDICINE | Admitting: INTERNAL MEDICINE
Payer: MEDICARE

## 2020-01-01 ENCOUNTER — APPOINTMENT (OUTPATIENT)
Dept: CT IMAGING | Age: 85
DRG: 956 | End: 2020-01-01
Payer: MEDICARE

## 2020-01-01 ENCOUNTER — ANESTHESIA EVENT (OUTPATIENT)
Dept: OPERATING ROOM | Age: 85
DRG: 956 | End: 2020-01-01
Payer: MEDICARE

## 2020-01-01 ENCOUNTER — ANESTHESIA (OUTPATIENT)
Dept: OPERATING ROOM | Age: 85
DRG: 956 | End: 2020-01-01
Payer: MEDICARE

## 2020-01-01 VITALS
BODY MASS INDEX: 18.61 KG/M2 | HEART RATE: 80 BPM | TEMPERATURE: 97.4 F | WEIGHT: 109 LBS | RESPIRATION RATE: 20 BRPM | OXYGEN SATURATION: 81 % | SYSTOLIC BLOOD PRESSURE: 89 MMHG | DIASTOLIC BLOOD PRESSURE: 51 MMHG | HEIGHT: 64 IN

## 2020-01-01 VITALS
SYSTOLIC BLOOD PRESSURE: 126 MMHG | DIASTOLIC BLOOD PRESSURE: 66 MMHG | BODY MASS INDEX: 17.93 KG/M2 | WEIGHT: 105 LBS | HEIGHT: 64 IN | HEART RATE: 58 BPM

## 2020-01-01 VITALS
OXYGEN SATURATION: 98 % | RESPIRATION RATE: 14 BRPM | SYSTOLIC BLOOD PRESSURE: 107 MMHG | TEMPERATURE: 97.9 F | DIASTOLIC BLOOD PRESSURE: 67 MMHG

## 2020-01-01 LAB
A/G RATIO: 1.5 (ref 1.1–2.2)
ABO/RH: NORMAL
ALBUMIN SERPL-MCNC: 2.7 G/DL (ref 3.4–5)
ALBUMIN SERPL-MCNC: 3.7 G/DL (ref 3.4–5)
ALP BLD-CCNC: 42 U/L (ref 40–129)
ALT SERPL-CCNC: 35 U/L (ref 10–40)
ANION GAP SERPL CALCULATED.3IONS-SCNC: 11 MMOL/L (ref 3–16)
ANION GAP SERPL CALCULATED.3IONS-SCNC: 11 MMOL/L (ref 3–16)
ANION GAP SERPL CALCULATED.3IONS-SCNC: 12 MMOL/L (ref 3–16)
ANION GAP SERPL CALCULATED.3IONS-SCNC: 14 MMOL/L (ref 3–16)
ANION GAP SERPL CALCULATED.3IONS-SCNC: 9 MMOL/L (ref 3–16)
ANISOCYTOSIS: ABNORMAL
ANTIBODY SCREEN: NORMAL
APTT: 28.3 SEC (ref 24.2–36.2)
AST SERPL-CCNC: 74 U/L (ref 15–37)
BANDED NEUTROPHILS RELATIVE PERCENT: 9 % (ref 0–7)
BASOPHILS ABSOLUTE: 0 K/UL (ref 0–0.2)
BASOPHILS ABSOLUTE: 0 K/UL (ref 0–0.2)
BASOPHILS RELATIVE PERCENT: 0 %
BASOPHILS RELATIVE PERCENT: 0.1 %
BILIRUB SERPL-MCNC: 1.2 MG/DL (ref 0–1)
BILIRUBIN URINE: NEGATIVE
BILIRUBIN URINE: NEGATIVE
BLOOD, URINE: NEGATIVE
BLOOD, URINE: NEGATIVE
BUN BLDV-MCNC: 16 MG/DL (ref 7–20)
BUN BLDV-MCNC: 21 MG/DL (ref 7–20)
BUN BLDV-MCNC: 25 MG/DL (ref 7–20)
BUN BLDV-MCNC: 30 MG/DL (ref 7–20)
BUN BLDV-MCNC: 35 MG/DL (ref 7–20)
CALCIUM SERPL-MCNC: 7.2 MG/DL (ref 8.3–10.6)
CALCIUM SERPL-MCNC: 7.3 MG/DL (ref 8.3–10.6)
CALCIUM SERPL-MCNC: 7.5 MG/DL (ref 8.3–10.6)
CALCIUM SERPL-MCNC: 7.9 MG/DL (ref 8.3–10.6)
CALCIUM SERPL-MCNC: 9 MG/DL (ref 8.3–10.6)
CHLORIDE BLD-SCNC: 103 MMOL/L (ref 99–110)
CHLORIDE BLD-SCNC: 111 MMOL/L (ref 99–110)
CHLORIDE BLD-SCNC: 116 MMOL/L (ref 99–110)
CHLORIDE BLD-SCNC: 119 MMOL/L (ref 99–110)
CHLORIDE BLD-SCNC: 120 MMOL/L (ref 99–110)
CLARITY: ABNORMAL
CLARITY: CLEAR
CO2: 18 MMOL/L (ref 21–32)
CO2: 19 MMOL/L (ref 21–32)
CO2: 21 MMOL/L (ref 21–32)
CO2: 22 MMOL/L (ref 21–32)
CO2: 22 MMOL/L (ref 21–32)
COLOR: YELLOW
COLOR: YELLOW
CREAT SERPL-MCNC: 0.6 MG/DL (ref 0.6–1.2)
CREAT SERPL-MCNC: 0.6 MG/DL (ref 0.6–1.2)
CREAT SERPL-MCNC: 0.7 MG/DL (ref 0.6–1.2)
CREAT SERPL-MCNC: 0.8 MG/DL (ref 0.6–1.2)
CREAT SERPL-MCNC: 1.1 MG/DL (ref 0.6–1.2)
EKG ATRIAL RATE: 312 BPM
EKG DIAGNOSIS: NORMAL
EKG Q-T INTERVAL: 424 MS
EKG QRS DURATION: 88 MS
EKG QTC CALCULATION (BAZETT): 486 MS
EKG R AXIS: 33 DEGREES
EKG T AXIS: -24 DEGREES
EKG VENTRICULAR RATE: 79 BPM
EOSINOPHILS ABSOLUTE: 0 K/UL (ref 0–0.6)
EOSINOPHILS ABSOLUTE: 0 K/UL (ref 0–0.6)
EOSINOPHILS RELATIVE PERCENT: 0 %
EOSINOPHILS RELATIVE PERCENT: 0 %
EPITHELIAL CELLS, UA: 1 /HPF (ref 0–5)
FERRITIN: 123.2 NG/ML (ref 15–150)
GFR AFRICAN AMERICAN: 57
GFR AFRICAN AMERICAN: >60
GFR NON-AFRICAN AMERICAN: 47
GFR NON-AFRICAN AMERICAN: >60
GLOBULIN: 2.4 G/DL
GLUCOSE BLD-MCNC: 100 MG/DL (ref 70–99)
GLUCOSE BLD-MCNC: 101 MG/DL (ref 70–99)
GLUCOSE BLD-MCNC: 101 MG/DL (ref 70–99)
GLUCOSE BLD-MCNC: 107 MG/DL (ref 70–99)
GLUCOSE BLD-MCNC: 115 MG/DL (ref 70–99)
GLUCOSE BLD-MCNC: 115 MG/DL (ref 70–99)
GLUCOSE BLD-MCNC: 116 MG/DL (ref 70–99)
GLUCOSE BLD-MCNC: 128 MG/DL (ref 70–99)
GLUCOSE BLD-MCNC: 158 MG/DL (ref 70–99)
GLUCOSE BLD-MCNC: 163 MG/DL (ref 70–99)
GLUCOSE BLD-MCNC: 98 MG/DL (ref 70–99)
GLUCOSE URINE: 250 MG/DL
GLUCOSE URINE: NEGATIVE MG/DL
HCT VFR BLD CALC: 22.8 % (ref 36–48)
HCT VFR BLD CALC: 24.6 % (ref 36–48)
HCT VFR BLD CALC: 25.9 % (ref 36–48)
HCT VFR BLD CALC: 32 % (ref 36–48)
HEMOGLOBIN: 10.3 G/DL (ref 12–16)
HEMOGLOBIN: 7.3 G/DL (ref 12–16)
HEMOGLOBIN: 7.9 G/DL (ref 12–16)
HEMOGLOBIN: 8.2 G/DL (ref 12–16)
HYALINE CASTS: 2 /LPF (ref 0–8)
HYPOCHROMIA: ABNORMAL
INR BLD: 1.42 (ref 0.86–1.14)
IRON SATURATION: 5 % (ref 15–50)
IRON: 13 UG/DL (ref 37–145)
KETONES, URINE: NEGATIVE MG/DL
KETONES, URINE: NEGATIVE MG/DL
LEUKOCYTE ESTERASE, URINE: NEGATIVE
LEUKOCYTE ESTERASE, URINE: NEGATIVE
LYMPHOCYTES ABSOLUTE: 0.8 K/UL (ref 1–5.1)
LYMPHOCYTES ABSOLUTE: 0.8 K/UL (ref 1–5.1)
LYMPHOCYTES RELATIVE PERCENT: 7.3 %
LYMPHOCYTES RELATIVE PERCENT: 9 %
MCH RBC QN AUTO: 28.4 PG (ref 26–34)
MCH RBC QN AUTO: 28.4 PG (ref 26–34)
MCH RBC QN AUTO: 28.6 PG (ref 26–34)
MCH RBC QN AUTO: 28.8 PG (ref 26–34)
MCHC RBC AUTO-ENTMCNC: 31.8 G/DL (ref 31–36)
MCHC RBC AUTO-ENTMCNC: 31.9 G/DL (ref 31–36)
MCHC RBC AUTO-ENTMCNC: 32.2 G/DL (ref 31–36)
MCHC RBC AUTO-ENTMCNC: 32.2 G/DL (ref 31–36)
MCV RBC AUTO: 88.1 FL (ref 80–100)
MCV RBC AUTO: 88.7 FL (ref 80–100)
MCV RBC AUTO: 89.2 FL (ref 80–100)
MCV RBC AUTO: 90.6 FL (ref 80–100)
METAMYELOCYTES RELATIVE PERCENT: 1 %
MICROSCOPIC EXAMINATION: NORMAL
MICROSCOPIC EXAMINATION: YES
MONOCYTES ABSOLUTE: 0.4 K/UL (ref 0–1.3)
MONOCYTES ABSOLUTE: 1 K/UL (ref 0–1.3)
MONOCYTES RELATIVE PERCENT: 5 %
MONOCYTES RELATIVE PERCENT: 9.7 %
NEUTROPHILS ABSOLUTE: 7.3 K/UL (ref 1.7–7.7)
NEUTROPHILS ABSOLUTE: 8.8 K/UL (ref 1.7–7.7)
NEUTROPHILS RELATIVE PERCENT: 76 %
NEUTROPHILS RELATIVE PERCENT: 82.9 %
NITRITE, URINE: NEGATIVE
NITRITE, URINE: NEGATIVE
NUCLEATED RED BLOOD CELLS: 6 /100 WBC
NUCLEATED RED BLOOD CELLS: 6 /100 WBC
OVALOCYTES: ABNORMAL
PDW BLD-RTO: 16.3 % (ref 12.4–15.4)
PDW BLD-RTO: 16.7 % (ref 12.4–15.4)
PDW BLD-RTO: 16.8 % (ref 12.4–15.4)
PDW BLD-RTO: 17.1 % (ref 12.4–15.4)
PERFORMED ON: ABNORMAL
PERFORMED ON: NORMAL
PH UA: 5 (ref 5–8)
PH UA: 6 (ref 5–8)
PHOSPHORUS: 0.5 MG/DL (ref 2.5–4.9)
PLATELET # BLD: 113 K/UL (ref 135–450)
PLATELET # BLD: 131 K/UL (ref 135–450)
PLATELET # BLD: 156 K/UL (ref 135–450)
PLATELET # BLD: 200 K/UL (ref 135–450)
PMV BLD AUTO: 8.5 FL (ref 5–10.5)
PMV BLD AUTO: 8.9 FL (ref 5–10.5)
PMV BLD AUTO: 9.2 FL (ref 5–10.5)
PMV BLD AUTO: 9.4 FL (ref 5–10.5)
POLYCHROMASIA: ABNORMAL
POTASSIUM SERPL-SCNC: 3.3 MMOL/L (ref 3.5–5.1)
POTASSIUM SERPL-SCNC: 3.7 MMOL/L (ref 3.5–5.1)
POTASSIUM SERPL-SCNC: 4.5 MMOL/L (ref 3.5–5.1)
POTASSIUM SERPL-SCNC: 4.5 MMOL/L (ref 3.5–5.1)
POTASSIUM SERPL-SCNC: 4.8 MMOL/L (ref 3.5–5.1)
PRO-BNP: ABNORMAL PG/ML (ref 0–449)
PRO-BNP: ABNORMAL PG/ML (ref 0–449)
PROTEIN UA: NEGATIVE MG/DL
PROTEIN UA: NEGATIVE MG/DL
PROTHROMBIN TIME: 16.5 SEC (ref 10–13.2)
RBC # BLD: 2.56 M/UL (ref 4–5.2)
RBC # BLD: 2.79 M/UL (ref 4–5.2)
RBC # BLD: 2.86 M/UL (ref 4–5.2)
RBC # BLD: 3.61 M/UL (ref 4–5.2)
RBC UA: 4 /HPF (ref 0–4)
SODIUM BLD-SCNC: 138 MMOL/L (ref 136–145)
SODIUM BLD-SCNC: 140 MMOL/L (ref 136–145)
SODIUM BLD-SCNC: 147 MMOL/L (ref 136–145)
SODIUM BLD-SCNC: 151 MMOL/L (ref 136–145)
SODIUM BLD-SCNC: 152 MMOL/L (ref 136–145)
SPECIFIC GRAVITY UA: 1.01 (ref 1–1.03)
SPECIFIC GRAVITY UA: 1.02 (ref 1–1.03)
TOTAL CK: 1844 U/L (ref 26–192)
TOTAL CK: 2663 U/L (ref 26–192)
TOTAL CK: 2740 U/L (ref 26–192)
TOTAL CK: 2953 U/L (ref 26–192)
TOTAL CK: 643 U/L (ref 26–192)
TOTAL IRON BINDING CAPACITY: 269 UG/DL (ref 260–445)
TOTAL PROTEIN: 6.1 G/DL (ref 6.4–8.2)
TROPONIN: 0.15 NG/ML
TROPONIN: 0.16 NG/ML
TROPONIN: 0.29 NG/ML
TSH REFLEX: 2.24 UIU/ML (ref 0.27–4.2)
URINE REFLEX TO CULTURE: ABNORMAL
URINE TYPE: ABNORMAL
URINE TYPE: NORMAL
UROBILINOGEN, URINE: 0.2 E.U./DL
UROBILINOGEN, URINE: 0.2 E.U./DL
WBC # BLD: 10.7 K/UL (ref 4–11)
WBC # BLD: 6.7 K/UL (ref 4–11)
WBC # BLD: 7.7 K/UL (ref 4–11)
WBC # BLD: 8.5 K/UL (ref 4–11)
WBC UA: 1 /HPF (ref 0–5)

## 2020-01-01 PROCEDURE — 82550 ASSAY OF CK (CPK): CPT

## 2020-01-01 PROCEDURE — 73502 X-RAY EXAM HIP UNI 2-3 VIEWS: CPT

## 2020-01-01 PROCEDURE — 85027 COMPLETE CBC AUTOMATED: CPT

## 2020-01-01 PROCEDURE — 1200000000 HC SEMI PRIVATE

## 2020-01-01 PROCEDURE — 70450 CT HEAD/BRAIN W/O DYE: CPT

## 2020-01-01 PROCEDURE — 36415 COLL VENOUS BLD VENIPUNCTURE: CPT

## 2020-01-01 PROCEDURE — G0438 PPPS, INITIAL VISIT: HCPCS | Performed by: INTERNAL MEDICINE

## 2020-01-01 PROCEDURE — 6360000002 HC RX W HCPCS: Performed by: NURSE PRACTITIONER

## 2020-01-01 PROCEDURE — 97530 THERAPEUTIC ACTIVITIES: CPT

## 2020-01-01 PROCEDURE — C9113 INJ PANTOPRAZOLE SODIUM, VIA: HCPCS | Performed by: NURSE PRACTITIONER

## 2020-01-01 PROCEDURE — 6360000002 HC RX W HCPCS: Performed by: ORTHOPAEDIC SURGERY

## 2020-01-01 PROCEDURE — 6370000000 HC RX 637 (ALT 250 FOR IP): Performed by: ORTHOPAEDIC SURGERY

## 2020-01-01 PROCEDURE — 99222 1ST HOSP IP/OBS MODERATE 55: CPT | Performed by: ORTHOPAEDIC SURGERY

## 2020-01-01 PROCEDURE — 94761 N-INVAS EAR/PLS OXIMETRY MLT: CPT

## 2020-01-01 PROCEDURE — 94760 N-INVAS EAR/PLS OXIMETRY 1: CPT

## 2020-01-01 PROCEDURE — 6370000000 HC RX 637 (ALT 250 FOR IP): Performed by: NURSE PRACTITIONER

## 2020-01-01 PROCEDURE — APPNB30 APP NON BILLABLE TIME 0-30 MINS: Performed by: NURSE PRACTITIONER

## 2020-01-01 PROCEDURE — 80048 BASIC METABOLIC PNL TOTAL CA: CPT

## 2020-01-01 PROCEDURE — 92526 ORAL FUNCTION THERAPY: CPT

## 2020-01-01 PROCEDURE — 2709999900 HC NON-CHARGEABLE SUPPLY: Performed by: ORTHOPAEDIC SURGERY

## 2020-01-01 PROCEDURE — 2580000003 HC RX 258: Performed by: NURSE PRACTITIONER

## 2020-01-01 PROCEDURE — 3600000004 HC SURGERY LEVEL 4 BASE: Performed by: ORTHOPAEDIC SURGERY

## 2020-01-01 PROCEDURE — 6360000002 HC RX W HCPCS: Performed by: ANESTHESIOLOGY

## 2020-01-01 PROCEDURE — 2580000003 HC RX 258: Performed by: EMERGENCY MEDICINE

## 2020-01-01 PROCEDURE — 0QS706Z REPOSITION LEFT UPPER FEMUR WITH INTRAMEDULLARY INTERNAL FIXATION DEVICE, OPEN APPROACH: ICD-10-PCS | Performed by: ORTHOPAEDIC SURGERY

## 2020-01-01 PROCEDURE — 92610 EVALUATE SWALLOWING FUNCTION: CPT

## 2020-01-01 PROCEDURE — 97535 SELF CARE MNGMENT TRAINING: CPT

## 2020-01-01 PROCEDURE — C1713 ANCHOR/SCREW BN/BN,TIS/BN: HCPCS | Performed by: ORTHOPAEDIC SURGERY

## 2020-01-01 PROCEDURE — 86850 RBC ANTIBODY SCREEN: CPT

## 2020-01-01 PROCEDURE — 86900 BLOOD TYPING SEROLOGIC ABO: CPT

## 2020-01-01 PROCEDURE — 73030 X-RAY EXAM OF SHOULDER: CPT

## 2020-01-01 PROCEDURE — 99024 POSTOP FOLLOW-UP VISIT: CPT | Performed by: NURSE PRACTITIONER

## 2020-01-01 PROCEDURE — 3700000001 HC ADD 15 MINUTES (ANESTHESIA): Performed by: ORTHOPAEDIC SURGERY

## 2020-01-01 PROCEDURE — 97162 PT EVAL MOD COMPLEX 30 MIN: CPT

## 2020-01-01 PROCEDURE — 2500000003 HC RX 250 WO HCPCS: Performed by: NURSE PRACTITIONER

## 2020-01-01 PROCEDURE — 83540 ASSAY OF IRON: CPT

## 2020-01-01 PROCEDURE — 2700000000 HC OXYGEN THERAPY PER DAY

## 2020-01-01 PROCEDURE — 3600000014 HC SURGERY LEVEL 4 ADDTL 15MIN: Performed by: ORTHOPAEDIC SURGERY

## 2020-01-01 PROCEDURE — 2580000003 HC RX 258: Performed by: ORTHOPAEDIC SURGERY

## 2020-01-01 PROCEDURE — C1769 GUIDE WIRE: HCPCS | Performed by: ORTHOPAEDIC SURGERY

## 2020-01-01 PROCEDURE — 80053 COMPREHEN METABOLIC PANEL: CPT

## 2020-01-01 PROCEDURE — 4040F PNEUMOC VAC/ADMIN/RCVD: CPT | Performed by: INTERNAL MEDICINE

## 2020-01-01 PROCEDURE — 83880 ASSAY OF NATRIURETIC PEPTIDE: CPT

## 2020-01-01 PROCEDURE — 83550 IRON BINDING TEST: CPT

## 2020-01-01 PROCEDURE — 71045 X-RAY EXAM CHEST 1 VIEW: CPT

## 2020-01-01 PROCEDURE — 3700000000 HC ANESTHESIA ATTENDED CARE: Performed by: ORTHOPAEDIC SURGERY

## 2020-01-01 PROCEDURE — 2720000010 HC SURG SUPPLY STERILE: Performed by: ORTHOPAEDIC SURGERY

## 2020-01-01 PROCEDURE — 99285 EMERGENCY DEPT VISIT HI MDM: CPT

## 2020-01-01 PROCEDURE — 3209999900 FLUORO FOR SURGICAL PROCEDURES

## 2020-01-01 PROCEDURE — 84443 ASSAY THYROID STIM HORMONE: CPT

## 2020-01-01 PROCEDURE — 86901 BLOOD TYPING SEROLOGIC RH(D): CPT

## 2020-01-01 PROCEDURE — 93005 ELECTROCARDIOGRAM TRACING: CPT | Performed by: NURSE PRACTITIONER

## 2020-01-01 PROCEDURE — 84484 ASSAY OF TROPONIN QUANT: CPT

## 2020-01-01 PROCEDURE — 81001 URINALYSIS AUTO W/SCOPE: CPT

## 2020-01-01 PROCEDURE — 93010 ELECTROCARDIOGRAM REPORT: CPT | Performed by: INTERNAL MEDICINE

## 2020-01-01 PROCEDURE — 99024 POSTOP FOLLOW-UP VISIT: CPT | Performed by: ORTHOPAEDIC SURGERY

## 2020-01-01 PROCEDURE — 82728 ASSAY OF FERRITIN: CPT

## 2020-01-01 PROCEDURE — 2500000003 HC RX 250 WO HCPCS: Performed by: ORTHOPAEDIC SURGERY

## 2020-01-01 PROCEDURE — 7100000000 HC PACU RECOVERY - FIRST 15 MIN: Performed by: ORTHOPAEDIC SURGERY

## 2020-01-01 PROCEDURE — 27245 TREAT THIGH FRACTURE: CPT | Performed by: ORTHOPAEDIC SURGERY

## 2020-01-01 PROCEDURE — 85025 COMPLETE CBC W/AUTO DIFF WBC: CPT

## 2020-01-01 PROCEDURE — 2500000003 HC RX 250 WO HCPCS: Performed by: ANESTHESIOLOGY

## 2020-01-01 PROCEDURE — 1123F ACP DISCUSS/DSCN MKR DOCD: CPT | Performed by: INTERNAL MEDICINE

## 2020-01-01 PROCEDURE — 72125 CT NECK SPINE W/O DYE: CPT

## 2020-01-01 PROCEDURE — G8482 FLU IMMUNIZE ORDER/ADMIN: HCPCS | Performed by: INTERNAL MEDICINE

## 2020-01-01 PROCEDURE — 94669 MECHANICAL CHEST WALL OSCILL: CPT

## 2020-01-01 PROCEDURE — 7100000001 HC PACU RECOVERY - ADDTL 15 MIN: Performed by: ORTHOPAEDIC SURGERY

## 2020-01-01 PROCEDURE — 85730 THROMBOPLASTIN TIME PARTIAL: CPT

## 2020-01-01 PROCEDURE — 97166 OT EVAL MOD COMPLEX 45 MIN: CPT

## 2020-01-01 PROCEDURE — 31720 CLEARANCE OF AIRWAYS: CPT

## 2020-01-01 PROCEDURE — 85610 PROTHROMBIN TIME: CPT

## 2020-01-01 PROCEDURE — 80069 RENAL FUNCTION PANEL: CPT

## 2020-01-01 PROCEDURE — APPNB45 APP NON BILLABLE 31-45 MINUTES: Performed by: NURSE PRACTITIONER

## 2020-01-01 PROCEDURE — 6370000000 HC RX 637 (ALT 250 FOR IP): Performed by: INTERNAL MEDICINE

## 2020-01-01 DEVICE — LOCKING SCREW, FULLY THREADED: Type: IMPLANTABLE DEVICE | Site: HIP | Status: FUNCTIONAL

## 2020-01-01 DEVICE — LONG NAIL KIT R1.5, TI, LEFT
Type: IMPLANTABLE DEVICE | Site: HIP | Status: FUNCTIONAL
Brand: GAMMA

## 2020-01-01 DEVICE — LAG SCREW, TI
Type: IMPLANTABLE DEVICE | Site: HIP | Status: FUNCTIONAL
Brand: GAMMA

## 2020-01-01 RX ORDER — DEXTROSE AND SODIUM CHLORIDE 5; .45 G/100ML; G/100ML
INJECTION, SOLUTION INTRAVENOUS CONTINUOUS
Status: DISCONTINUED | OUTPATIENT
Start: 2020-01-01 | End: 2020-01-01

## 2020-01-01 RX ORDER — PANTOPRAZOLE SODIUM 40 MG/1
40 TABLET, DELAYED RELEASE ORAL
Status: DISCONTINUED | OUTPATIENT
Start: 2020-01-01 | End: 2020-01-01

## 2020-01-01 RX ORDER — SODIUM CHLORIDE 9 MG/ML
INJECTION, SOLUTION INTRAVENOUS CONTINUOUS
Status: DISCONTINUED | OUTPATIENT
Start: 2020-01-01 | End: 2020-01-01

## 2020-01-01 RX ORDER — LEVOFLOXACIN 5 MG/ML
500 INJECTION, SOLUTION INTRAVENOUS EVERY 24 HOURS
Status: DISCONTINUED | OUTPATIENT
Start: 2020-01-01 | End: 2020-01-01 | Stop reason: HOSPADM

## 2020-01-01 RX ORDER — HYDROMORPHONE HCL 110MG/55ML
0.5 PATIENT CONTROLLED ANALGESIA SYRINGE INTRAVENOUS EVERY 5 MIN PRN
Status: DISCONTINUED | OUTPATIENT
Start: 2020-01-01 | End: 2020-01-01 | Stop reason: HOSPADM

## 2020-01-01 RX ORDER — DOCUSATE SODIUM 100 MG/1
100 CAPSULE, LIQUID FILLED ORAL 2 TIMES DAILY
Status: DISCONTINUED | OUTPATIENT
Start: 2020-01-01 | End: 2020-01-01 | Stop reason: HOSPADM

## 2020-01-01 RX ORDER — MORPHINE SULFATE 2 MG/ML
2 INJECTION, SOLUTION INTRAMUSCULAR; INTRAVENOUS
Status: DISCONTINUED | OUTPATIENT
Start: 2020-01-01 | End: 2020-01-01 | Stop reason: HOSPADM

## 2020-01-01 RX ORDER — BUPIVACAINE HYDROCHLORIDE 5 MG/ML
INJECTION, SOLUTION EPIDURAL; INTRACAUDAL
Status: COMPLETED | OUTPATIENT
Start: 2020-01-01 | End: 2020-01-01

## 2020-01-01 RX ORDER — DEXAMETHASONE SODIUM PHOSPHATE 4 MG/ML
INJECTION, SOLUTION INTRA-ARTICULAR; INTRALESIONAL; INTRAMUSCULAR; INTRAVENOUS; SOFT TISSUE PRN
Status: DISCONTINUED | OUTPATIENT
Start: 2020-01-01 | End: 2020-01-01 | Stop reason: SDUPTHER

## 2020-01-01 RX ORDER — DOCUSATE SODIUM 100 MG/1
100 CAPSULE, LIQUID FILLED ORAL 2 TIMES DAILY
Status: DISCONTINUED | OUTPATIENT
Start: 2020-01-01 | End: 2020-01-01

## 2020-01-01 RX ORDER — SODIUM CHLORIDE 0.9 % (FLUSH) 0.9 %
10 SYRINGE (ML) INJECTION PRN
Status: DISCONTINUED | OUTPATIENT
Start: 2020-01-01 | End: 2020-01-01

## 2020-01-01 RX ORDER — PHENYLEPHRINE HCL IN 0.9% NACL 1 MG/10 ML
SYRINGE (ML) INTRAVENOUS PRN
Status: DISCONTINUED | OUTPATIENT
Start: 2020-01-01 | End: 2020-01-01 | Stop reason: SDUPTHER

## 2020-01-01 RX ORDER — CEFAZOLIN SODIUM 1 G/3ML
INJECTION, POWDER, FOR SOLUTION INTRAMUSCULAR; INTRAVENOUS PRN
Status: DISCONTINUED | OUTPATIENT
Start: 2020-01-01 | End: 2020-01-01 | Stop reason: SDUPTHER

## 2020-01-01 RX ORDER — 0.9 % SODIUM CHLORIDE 0.9 %
1000 INTRAVENOUS SOLUTION INTRAVENOUS ONCE
Status: DISCONTINUED | OUTPATIENT
Start: 2020-01-01 | End: 2020-01-01

## 2020-01-01 RX ORDER — SODIUM CHLORIDE 0.9 % (FLUSH) 0.9 %
10 SYRINGE (ML) INJECTION EVERY 12 HOURS SCHEDULED
Status: DISCONTINUED | OUTPATIENT
Start: 2020-01-01 | End: 2020-01-01

## 2020-01-01 RX ORDER — SODIUM CHLORIDE 0.9 % (FLUSH) 0.9 %
10 SYRINGE (ML) INJECTION PRN
Status: DISCONTINUED | OUTPATIENT
Start: 2020-01-01 | End: 2020-01-01 | Stop reason: HOSPADM

## 2020-01-01 RX ORDER — SUCCINYLCHOLINE/SOD CL,ISO/PF 200MG/10ML
SYRINGE (ML) INTRAVENOUS PRN
Status: DISCONTINUED | OUTPATIENT
Start: 2020-01-01 | End: 2020-01-01 | Stop reason: SDUPTHER

## 2020-01-01 RX ORDER — DEXTROSE MONOHYDRATE 50 MG/ML
INJECTION, SOLUTION INTRAVENOUS CONTINUOUS
Status: DISCONTINUED | OUTPATIENT
Start: 2020-01-01 | End: 2020-01-01 | Stop reason: HOSPADM

## 2020-01-01 RX ORDER — DOCUSATE SODIUM 100 MG/1
100 CAPSULE, LIQUID FILLED ORAL 2 TIMES DAILY PRN
Qty: 60 CAPSULE | Refills: 0 | Status: SHIPPED | OUTPATIENT
Start: 2020-01-01

## 2020-01-01 RX ORDER — FENTANYL CITRATE 50 UG/ML
INJECTION, SOLUTION INTRAMUSCULAR; INTRAVENOUS PRN
Status: DISCONTINUED | OUTPATIENT
Start: 2020-01-01 | End: 2020-01-01 | Stop reason: SDUPTHER

## 2020-01-01 RX ORDER — HYDROCODONE BITARTRATE AND ACETAMINOPHEN 5; 325 MG/1; MG/1
2 TABLET ORAL EVERY 4 HOURS PRN
Status: DISCONTINUED | OUTPATIENT
Start: 2020-01-01 | End: 2020-01-01 | Stop reason: HOSPADM

## 2020-01-01 RX ORDER — 0.9 % SODIUM CHLORIDE 0.9 %
500 INTRAVENOUS SOLUTION INTRAVENOUS ONCE
Status: COMPLETED | OUTPATIENT
Start: 2020-01-01 | End: 2020-01-01

## 2020-01-01 RX ORDER — SODIUM CHLORIDE 0.9 % (FLUSH) 0.9 %
10 SYRINGE (ML) INJECTION EVERY 12 HOURS SCHEDULED
Status: DISCONTINUED | OUTPATIENT
Start: 2020-01-01 | End: 2020-01-01 | Stop reason: HOSPADM

## 2020-01-01 RX ORDER — LORAZEPAM 2 MG/ML
0.5 CONCENTRATE ORAL EVERY 4 HOURS PRN
Status: DISCONTINUED | OUTPATIENT
Start: 2020-01-01 | End: 2020-01-01 | Stop reason: HOSPADM

## 2020-01-01 RX ORDER — ACETAMINOPHEN 325 MG/1
650 TABLET ORAL EVERY 4 HOURS PRN
Status: DISCONTINUED | OUTPATIENT
Start: 2020-01-01 | End: 2020-01-01 | Stop reason: HOSPADM

## 2020-01-01 RX ORDER — CARBIDOPA AND LEVODOPA 25; 100 MG/1; MG/1
1 TABLET, EXTENDED RELEASE ORAL 2 TIMES DAILY
Status: DISCONTINUED | OUTPATIENT
Start: 2020-01-01 | End: 2020-01-01

## 2020-01-01 RX ORDER — HYDROCODONE BITARTRATE AND ACETAMINOPHEN 5; 325 MG/1; MG/1
1 TABLET ORAL EVERY 4 HOURS PRN
Status: DISCONTINUED | OUTPATIENT
Start: 2020-01-01 | End: 2020-01-01 | Stop reason: HOSPADM

## 2020-01-01 RX ORDER — FENTANYL CITRATE 50 UG/ML
25 INJECTION, SOLUTION INTRAMUSCULAR; INTRAVENOUS EVERY 5 MIN PRN
Status: DISCONTINUED | OUTPATIENT
Start: 2020-01-01 | End: 2020-01-01 | Stop reason: HOSPADM

## 2020-01-01 RX ORDER — PANTOPRAZOLE SODIUM 40 MG/1
40 TABLET, DELAYED RELEASE ORAL DAILY PRN
Qty: 90 TABLET | Refills: 3 | Status: SHIPPED | OUTPATIENT
Start: 2020-01-01

## 2020-01-01 RX ORDER — HALOPERIDOL 5 MG/ML
2 INJECTION INTRAMUSCULAR EVERY 6 HOURS PRN
Status: DISCONTINUED | OUTPATIENT
Start: 2020-01-01 | End: 2020-01-01 | Stop reason: HOSPADM

## 2020-01-01 RX ORDER — MAGNESIUM HYDROXIDE 1200 MG/15ML
LIQUID ORAL CONTINUOUS PRN
Status: COMPLETED | OUTPATIENT
Start: 2020-01-01 | End: 2020-01-01

## 2020-01-01 RX ORDER — 0.9 % SODIUM CHLORIDE 0.9 %
1000 INTRAVENOUS SOLUTION INTRAVENOUS ONCE
Status: COMPLETED | OUTPATIENT
Start: 2020-01-01 | End: 2020-01-01

## 2020-01-01 RX ORDER — LEVOTHYROXINE SODIUM 0.1 MG/1
100 TABLET ORAL DAILY
Status: DISCONTINUED | OUTPATIENT
Start: 2020-01-01 | End: 2020-01-01 | Stop reason: HOSPADM

## 2020-01-01 RX ORDER — PROMETHAZINE HYDROCHLORIDE 25 MG/ML
6.25 INJECTION, SOLUTION INTRAMUSCULAR; INTRAVENOUS
Status: DISCONTINUED | OUTPATIENT
Start: 2020-01-01 | End: 2020-01-01 | Stop reason: HOSPADM

## 2020-01-01 RX ORDER — ONDANSETRON 2 MG/ML
4 INJECTION INTRAMUSCULAR; INTRAVENOUS EVERY 6 HOURS PRN
Status: DISCONTINUED | OUTPATIENT
Start: 2020-01-01 | End: 2020-01-01

## 2020-01-01 RX ORDER — SENNA AND DOCUSATE SODIUM 50; 8.6 MG/1; MG/1
1 TABLET, FILM COATED ORAL 2 TIMES DAILY
Status: DISCONTINUED | OUTPATIENT
Start: 2020-01-01 | End: 2020-01-01 | Stop reason: HOSPADM

## 2020-01-01 RX ORDER — OXYCODONE HCL 20 MG/ML
5 CONCENTRATE, ORAL ORAL
Status: DISCONTINUED | OUTPATIENT
Start: 2020-01-01 | End: 2020-01-01 | Stop reason: HOSPADM

## 2020-01-01 RX ORDER — ONDANSETRON 2 MG/ML
4 INJECTION INTRAMUSCULAR; INTRAVENOUS EVERY 6 HOURS PRN
Status: DISCONTINUED | OUTPATIENT
Start: 2020-01-01 | End: 2020-01-01 | Stop reason: HOSPADM

## 2020-01-01 RX ORDER — PANTOPRAZOLE SODIUM 40 MG/10ML
40 INJECTION, POWDER, LYOPHILIZED, FOR SOLUTION INTRAVENOUS DAILY
Status: DISCONTINUED | OUTPATIENT
Start: 2020-01-01 | End: 2020-01-01 | Stop reason: HOSPADM

## 2020-01-01 RX ORDER — ACETAMINOPHEN 650 MG/1
650 SUPPOSITORY RECTAL EVERY 4 HOURS PRN
Status: DISCONTINUED | OUTPATIENT
Start: 2020-01-01 | End: 2020-01-01 | Stop reason: HOSPADM

## 2020-01-01 RX ORDER — FENTANYL CITRATE 50 UG/ML
25 INJECTION, SOLUTION INTRAMUSCULAR; INTRAVENOUS ONCE
Status: DISCONTINUED | OUTPATIENT
Start: 2020-01-01 | End: 2020-01-01 | Stop reason: HOSPADM

## 2020-01-01 RX ORDER — LABETALOL HYDROCHLORIDE 5 MG/ML
5 INJECTION, SOLUTION INTRAVENOUS EVERY 10 MIN PRN
Status: DISCONTINUED | OUTPATIENT
Start: 2020-01-01 | End: 2020-01-01 | Stop reason: HOSPADM

## 2020-01-01 RX ORDER — LIDOCAINE HYDROCHLORIDE 20 MG/ML
INJECTION, SOLUTION EPIDURAL; INFILTRATION; INTRACAUDAL; PERINEURAL PRN
Status: DISCONTINUED | OUTPATIENT
Start: 2020-01-01 | End: 2020-01-01 | Stop reason: SDUPTHER

## 2020-01-01 RX ORDER — KETOROLAC TROMETHAMINE 15 MG/ML
15 INJECTION, SOLUTION INTRAMUSCULAR; INTRAVENOUS EVERY 6 HOURS PRN
Status: DISCONTINUED | OUTPATIENT
Start: 2020-01-01 | End: 2020-01-01 | Stop reason: HOSPADM

## 2020-01-01 RX ORDER — PROPOFOL 10 MG/ML
INJECTION, EMULSION INTRAVENOUS PRN
Status: DISCONTINUED | OUTPATIENT
Start: 2020-01-01 | End: 2020-01-01 | Stop reason: SDUPTHER

## 2020-01-01 RX ADMIN — Medication 100 MCG: at 10:34

## 2020-01-01 RX ADMIN — APIXABAN 2.5 MG: 2.5 TABLET, FILM COATED ORAL at 20:06

## 2020-01-01 RX ADMIN — LIDOCAINE HYDROCHLORIDE 100 MG: 20 INJECTION, SOLUTION EPIDURAL; INFILTRATION; INTRACAUDAL; PERINEURAL at 10:08

## 2020-01-01 RX ADMIN — Medication 0.5 MG: at 23:01

## 2020-01-01 RX ADMIN — PANTOPRAZOLE SODIUM 40 MG: 40 INJECTION, POWDER, LYOPHILIZED, FOR SOLUTION INTRAVENOUS at 09:44

## 2020-01-01 RX ADMIN — IRON SUCROSE 200 MG: 20 INJECTION, SOLUTION INTRAVENOUS at 09:30

## 2020-01-01 RX ADMIN — MORPHINE SULFATE 2 MG: 2 INJECTION, SOLUTION INTRAMUSCULAR; INTRAVENOUS at 17:44

## 2020-01-01 RX ADMIN — SODIUM CHLORIDE: 9 INJECTION, SOLUTION INTRAVENOUS at 05:22

## 2020-01-01 RX ADMIN — PANTOPRAZOLE SODIUM 40 MG: 40 INJECTION, POWDER, LYOPHILIZED, FOR SOLUTION INTRAVENOUS at 12:44

## 2020-01-01 RX ADMIN — MORPHINE SULFATE 2 MG: 2 INJECTION, SOLUTION INTRAMUSCULAR; INTRAVENOUS at 16:02

## 2020-01-01 RX ADMIN — DEXTROSE AND SODIUM CHLORIDE: 5; 450 INJECTION, SOLUTION INTRAVENOUS at 11:04

## 2020-01-01 RX ADMIN — MORPHINE SULFATE 2 MG: 2 INJECTION, SOLUTION INTRAMUSCULAR; INTRAVENOUS at 10:32

## 2020-01-01 RX ADMIN — ENOXAPARIN SODIUM 30 MG: 30 INJECTION SUBCUTANEOUS at 11:04

## 2020-01-01 RX ADMIN — FENTANYL CITRATE 50 MCG: 50 INJECTION, SOLUTION INTRAMUSCULAR; INTRAVENOUS at 10:08

## 2020-01-01 RX ADMIN — KETOROLAC TROMETHAMINE 15 MG: 15 INJECTION, SOLUTION INTRAMUSCULAR; INTRAVENOUS at 15:45

## 2020-01-01 RX ADMIN — Medication 200 MCG: at 11:01

## 2020-01-01 RX ADMIN — Medication 100 MCG: at 10:40

## 2020-01-01 RX ADMIN — CARBIDOPA AND LEVODOPA 1 TABLET: 25; 100 TABLET, EXTENDED RELEASE ORAL at 00:00

## 2020-01-01 RX ADMIN — DEXTROSE MONOHYDRATE: 50 INJECTION, SOLUTION INTRAVENOUS at 11:56

## 2020-01-01 RX ADMIN — Medication 100 MCG: at 10:24

## 2020-01-01 RX ADMIN — PANTOPRAZOLE SODIUM 40 MG: 40 INJECTION, POWDER, LYOPHILIZED, FOR SOLUTION INTRAVENOUS at 12:15

## 2020-01-01 RX ADMIN — HALOPERIDOL LACTATE 2 MG: 5 INJECTION INTRAMUSCULAR at 20:01

## 2020-01-01 RX ADMIN — Medication 100 MCG: at 11:09

## 2020-01-01 RX ADMIN — HALOPERIDOL LACTATE 2 MG: 5 INJECTION INTRAMUSCULAR at 01:53

## 2020-01-01 RX ADMIN — APIXABAN 2.5 MG: 2.5 TABLET, FILM COATED ORAL at 09:29

## 2020-01-01 RX ADMIN — SODIUM CHLORIDE 1000 ML: 9 INJECTION, SOLUTION INTRAVENOUS at 16:47

## 2020-01-01 RX ADMIN — DEXTROSE MONOHYDRATE: 50 INJECTION, SOLUTION INTRAVENOUS at 04:06

## 2020-01-01 RX ADMIN — MORPHINE SULFATE 2 MG: 2 INJECTION, SOLUTION INTRAMUSCULAR; INTRAVENOUS at 21:48

## 2020-01-01 RX ADMIN — Medication 100 MCG: at 10:08

## 2020-01-01 RX ADMIN — DEXTROSE AND SODIUM CHLORIDE: 5; 450 INJECTION, SOLUTION INTRAVENOUS at 11:46

## 2020-01-01 RX ADMIN — PANTOPRAZOLE SODIUM 40 MG: 40 INJECTION, POWDER, LYOPHILIZED, FOR SOLUTION INTRAVENOUS at 11:07

## 2020-01-01 RX ADMIN — CEFAZOLIN 2000 MG: 1 INJECTION, POWDER, FOR SOLUTION INTRAVENOUS at 10:06

## 2020-01-01 RX ADMIN — IRON SUCROSE 200 MG: 20 INJECTION, SOLUTION INTRAVENOUS at 12:44

## 2020-01-01 RX ADMIN — Medication 100 MCG: at 10:17

## 2020-01-01 RX ADMIN — HALOPERIDOL LACTATE 2 MG: 5 INJECTION INTRAMUSCULAR at 11:46

## 2020-01-01 RX ADMIN — POTASSIUM PHOSPHATE, MONOBASIC AND POTASSIUM PHOSPHATE, DIBASIC 20 MMOL: 224; 236 INJECTION, SOLUTION, CONCENTRATE INTRAVENOUS at 11:52

## 2020-01-01 RX ADMIN — SODIUM CHLORIDE: 9 INJECTION, SOLUTION INTRAVENOUS at 20:01

## 2020-01-01 RX ADMIN — KETOROLAC TROMETHAMINE 15 MG: 15 INJECTION, SOLUTION INTRAMUSCULAR; INTRAVENOUS at 18:56

## 2020-01-01 RX ADMIN — CEFAZOLIN SODIUM 2 G: 10 INJECTION, POWDER, FOR SOLUTION INTRAVENOUS at 17:05

## 2020-01-01 RX ADMIN — DEXAMETHASONE SODIUM PHOSPHATE 10 MG: 4 INJECTION, SOLUTION INTRAMUSCULAR; INTRAVENOUS at 10:08

## 2020-01-01 RX ADMIN — Medication 100 MCG: at 10:37

## 2020-01-01 RX ADMIN — Medication 10 ML: at 00:37

## 2020-01-01 RX ADMIN — CARBIDOPA AND LEVODOPA 1 TABLET: 25; 100 TABLET ORAL at 20:06

## 2020-01-01 RX ADMIN — LEVOFLOXACIN 500 MG: 5 INJECTION, SOLUTION INTRAVENOUS at 14:21

## 2020-01-01 RX ADMIN — IRON SUCROSE 200 MG: 20 INJECTION, SOLUTION INTRAVENOUS at 09:44

## 2020-01-01 RX ADMIN — DEXTROSE AND SODIUM CHLORIDE: 5; 450 INJECTION, SOLUTION INTRAVENOUS at 20:00

## 2020-01-01 RX ADMIN — ENOXAPARIN SODIUM 30 MG: 30 INJECTION SUBCUTANEOUS at 09:48

## 2020-01-01 RX ADMIN — SODIUM CHLORIDE 500 ML: 9 INJECTION, SOLUTION INTRAVENOUS at 18:23

## 2020-01-01 RX ADMIN — SODIUM CHLORIDE: 9 INJECTION, SOLUTION INTRAVENOUS at 04:03

## 2020-01-01 RX ADMIN — ENOXAPARIN SODIUM 30 MG: 30 INJECTION SUBCUTANEOUS at 11:05

## 2020-01-01 RX ADMIN — Medication 120 MG: at 10:08

## 2020-01-01 RX ADMIN — ACETAMINOPHEN 650 MG: 650 SUPPOSITORY RECTAL at 22:25

## 2020-01-01 RX ADMIN — DEXTROSE AND SODIUM CHLORIDE: 5; 450 INJECTION, SOLUTION INTRAVENOUS at 21:20

## 2020-01-01 RX ADMIN — CEFAZOLIN SODIUM 2 G: 10 INJECTION, POWDER, FOR SOLUTION INTRAVENOUS at 05:22

## 2020-01-01 RX ADMIN — DEXTROSE MONOHYDRATE: 50 INJECTION, SOLUTION INTRAVENOUS at 14:21

## 2020-01-01 RX ADMIN — KETOROLAC TROMETHAMINE 15 MG: 15 INJECTION, SOLUTION INTRAMUSCULAR; INTRAVENOUS at 21:48

## 2020-01-01 RX ADMIN — PROPOFOL 100 MG: 10 INJECTION, EMULSION INTRAVENOUS at 10:08

## 2020-01-01 RX ADMIN — CARBIDOPA AND LEVODOPA 1 TABLET: 25; 100 TABLET ORAL at 08:47

## 2020-01-01 RX ADMIN — Medication 200 MCG: at 10:50

## 2020-01-01 RX ADMIN — DEXTROSE MONOHYDRATE: 50 INJECTION, SOLUTION INTRAVENOUS at 00:27

## 2020-01-01 ASSESSMENT — PAIN SCALES - PAIN ASSESSMENT IN ADVANCED DEMENTIA (PAINAD)
NEGVOCALIZATION: 0
FACIALEXPRESSION: 0
BREATHING: 1
TOTALSCORE: 4
FACIALEXPRESSION: 2
NEGVOCALIZATION: 1
BODYLANGUAGE: 1
NEGVOCALIZATION: 1
CONSOLABILITY: 1
FACIALEXPRESSION: 2
BREATHING: 2
BODYLANGUAGE: 0
NEGVOCALIZATION: 2
BODYLANGUAGE: 1
BREATHING: 1
BREATHING: 1
TOTALSCORE: 1
FACIALEXPRESSION: 0
NEGVOCALIZATION: 2
BREATHING: 1
CONSOLABILITY: 0
BREATHING: 1
NEGVOCALIZATION: 0
NEGVOCALIZATION: 0
CONSOLABILITY: 0
TOTALSCORE: 1
CONSOLABILITY: 0
TOTALSCORE: 7
BREATHING: 1
TOTALSCORE: 1
FACIALEXPRESSION: 0
BREATHING: 1
FACIALEXPRESSION: 0
CONSOLABILITY: 1
BODYLANGUAGE: 0
FACIALEXPRESSION: 0
BODYLANGUAGE: 0
BODYLANGUAGE: 2
BODYLANGUAGE: 1
BODYLANGUAGE: 1
TOTALSCORE: 0
CONSOLABILITY: 1
CONSOLABILITY: 0
BODYLANGUAGE: 0
TOTALSCORE: 6
NEGVOCALIZATION: 0
NEGVOCALIZATION: 2
FACIALEXPRESSION: 0
NEGVOCALIZATION: 0
BREATHING: 1
TOTALSCORE: 7
CONSOLABILITY: 0
BODYLANGUAGE: 0
TOTALSCORE: 1
BREATHING: 1
BODYLANGUAGE: 0
CONSOLABILITY: 1
FACIALEXPRESSION: 1
CONSOLABILITY: 0
CONSOLABILITY: 0
BREATHING: 0
TOTALSCORE: 6
NEGVOCALIZATION: 0
TOTALSCORE: 1
FACIALEXPRESSION: 0
FACIALEXPRESSION: 1

## 2020-01-01 ASSESSMENT — LIFESTYLE VARIABLES: HOW OFTEN DO YOU HAVE A DRINK CONTAINING ALCOHOL: 0

## 2020-01-01 ASSESSMENT — PAIN DESCRIPTION - LOCATION
LOCATION: HIP

## 2020-01-01 ASSESSMENT — PULMONARY FUNCTION TESTS
PIF_VALUE: 19
PIF_VALUE: 18
PIF_VALUE: 18
PIF_VALUE: 19
PIF_VALUE: 18
PIF_VALUE: 3
PIF_VALUE: 18
PIF_VALUE: 3
PIF_VALUE: 18
PIF_VALUE: 20
PIF_VALUE: 19
PIF_VALUE: 0
PIF_VALUE: 1
PIF_VALUE: 19
PIF_VALUE: 0
PIF_VALUE: 15
PIF_VALUE: 9
PIF_VALUE: 3
PIF_VALUE: 18
PIF_VALUE: 19
PIF_VALUE: 18
PIF_VALUE: 16
PIF_VALUE: 18
PIF_VALUE: 19
PIF_VALUE: 18
PIF_VALUE: 16
PIF_VALUE: 1
PIF_VALUE: 15
PIF_VALUE: 19
PIF_VALUE: 19
PIF_VALUE: 16
PIF_VALUE: 19
PIF_VALUE: 2
PIF_VALUE: 3
PIF_VALUE: 15
PIF_VALUE: 2
PIF_VALUE: 16
PIF_VALUE: 18
PIF_VALUE: 19
PIF_VALUE: 19
PIF_VALUE: 18
PIF_VALUE: 18
PIF_VALUE: 15
PIF_VALUE: 2
PIF_VALUE: 17
PIF_VALUE: 19
PIF_VALUE: 6
PIF_VALUE: 26
PIF_VALUE: 15
PIF_VALUE: 18
PIF_VALUE: 7
PIF_VALUE: 16
PIF_VALUE: 19
PIF_VALUE: 18
PIF_VALUE: 19
PIF_VALUE: 18
PIF_VALUE: 16
PIF_VALUE: 19
PIF_VALUE: 18
PIF_VALUE: 19
PIF_VALUE: 19
PIF_VALUE: 16
PIF_VALUE: 18
PIF_VALUE: 19
PIF_VALUE: 18
PIF_VALUE: 19
PIF_VALUE: 3
PIF_VALUE: 19
PIF_VALUE: 18
PIF_VALUE: 16
PIF_VALUE: 18
PIF_VALUE: 19
PIF_VALUE: 18
PIF_VALUE: 19
PIF_VALUE: 18
PIF_VALUE: 17
PIF_VALUE: 1

## 2020-01-01 ASSESSMENT — PAIN SCALES - WONG BAKER
WONGBAKER_NUMERICALRESPONSE: 4
WONGBAKER_NUMERICALRESPONSE: 0
WONGBAKER_NUMERICALRESPONSE: 4
WONGBAKER_NUMERICALRESPONSE: 4
WONGBAKER_NUMERICALRESPONSE: 0
WONGBAKER_NUMERICALRESPONSE: 0
WONGBAKER_NUMERICALRESPONSE: 4
WONGBAKER_NUMERICALRESPONSE: 6
WONGBAKER_NUMERICALRESPONSE: 0
WONGBAKER_NUMERICALRESPONSE: 4
WONGBAKER_NUMERICALRESPONSE: 0
WONGBAKER_NUMERICALRESPONSE: 0
WONGBAKER_NUMERICALRESPONSE: 4
WONGBAKER_NUMERICALRESPONSE: 0
WONGBAKER_NUMERICALRESPONSE: 0
WONGBAKER_NUMERICALRESPONSE: 4
WONGBAKER_NUMERICALRESPONSE: 4
WONGBAKER_NUMERICALRESPONSE: 2
WONGBAKER_NUMERICALRESPONSE: 4
WONGBAKER_NUMERICALRESPONSE: 0
WONGBAKER_NUMERICALRESPONSE: 4
WONGBAKER_NUMERICALRESPONSE: 4
WONGBAKER_NUMERICALRESPONSE: 0
WONGBAKER_NUMERICALRESPONSE: 4
WONGBAKER_NUMERICALRESPONSE: 0
WONGBAKER_NUMERICALRESPONSE: 4
WONGBAKER_NUMERICALRESPONSE: 4
WONGBAKER_NUMERICALRESPONSE: 0
WONGBAKER_NUMERICALRESPONSE: 4
WONGBAKER_NUMERICALRESPONSE: 4
WONGBAKER_NUMERICALRESPONSE: 0
WONGBAKER_NUMERICALRESPONSE: 0

## 2020-01-01 ASSESSMENT — PAIN SCALES - GENERAL
PAINLEVEL_OUTOF10: 6
PAINLEVEL_OUTOF10: 7
PAINLEVEL_OUTOF10: 0
PAINLEVEL_OUTOF10: 5
PAINLEVEL_OUTOF10: 8
PAINLEVEL_OUTOF10: 4
PAINLEVEL_OUTOF10: 0
PAINLEVEL_OUTOF10: 8
PAINLEVEL_OUTOF10: 0
PAINLEVEL_OUTOF10: 6
PAINLEVEL_OUTOF10: 0
PAINLEVEL_OUTOF10: 2
PAINLEVEL_OUTOF10: 0
PAINLEVEL_OUTOF10: 6
PAINLEVEL_OUTOF10: 4
PAINLEVEL_OUTOF10: 0
PAINLEVEL_OUTOF10: 0
PAINLEVEL_OUTOF10: 7
PAINLEVEL_OUTOF10: 7

## 2020-01-01 ASSESSMENT — PATIENT HEALTH QUESTIONNAIRE - PHQ9
SUM OF ALL RESPONSES TO PHQ QUESTIONS 1-9: 2
SUM OF ALL RESPONSES TO PHQ QUESTIONS 1-9: 2

## 2020-01-01 ASSESSMENT — PAIN DESCRIPTION - PROGRESSION
CLINICAL_PROGRESSION: NOT CHANGED
CLINICAL_PROGRESSION: NOT CHANGED

## 2020-01-01 ASSESSMENT — ENCOUNTER SYMPTOMS
CONSTIPATION: 0
DIARRHEA: 0
GASTROINTESTINAL NEGATIVE: 1
CHEST TIGHTNESS: 0
ABDOMINAL DISTENTION: 0
ABDOMINAL PAIN: 0
EYES NEGATIVE: 1
COUGH: 0
WHEEZING: 0
SHORTNESS OF BREATH: 0
BLOOD IN STOOL: 0

## 2020-01-01 ASSESSMENT — PAIN DESCRIPTION - ORIENTATION
ORIENTATION: LEFT

## 2020-01-01 ASSESSMENT — PAIN DESCRIPTION - DESCRIPTORS
DESCRIPTORS: SORE
DESCRIPTORS: PATIENT UNABLE TO DESCRIBE

## 2020-01-01 ASSESSMENT — PAIN DESCRIPTION - PAIN TYPE
TYPE: ACUTE PAIN
TYPE: SURGICAL PAIN

## 2020-01-01 ASSESSMENT — PAIN DESCRIPTION - FREQUENCY
FREQUENCY: CONTINUOUS
FREQUENCY: CONTINUOUS

## 2020-01-01 ASSESSMENT — PAIN DESCRIPTION - ONSET
ONSET: ON-GOING
ONSET: ON-GOING

## 2020-01-23 NOTE — PROGRESS NOTES
Subjective:      Patient ID: Josh Parish is a 80 y.o. female. 2020 Patient presents with:  Medicare AWV    Abdominal cramps . Last seen  2019 Patient presents with: Follow-up  Atrial Fibrillation                19 Patient presents with: Anxiety: depression too at times   Tremors: wants to see neurology now  Urinary Frequency            Last seen  19 Patient presents with:  Check-Up: feeling better over all     No falls  No Depression     Does some house work a little at a time             Last seen  19 Patient presents with: Follow-Up from Hospital: GI bleed, Afib, Abnormal INR    Admit Date:    2019   Discharge Date: 2019    INR > 6 in ED and occult positive.  Admitted as inpatient for further management.  Started on Protonix gtt.  Given Vit K SQ and 2 U FFP on .  INR 2.9 on . Pt GI evaluated and recommended outpt EGD & Colonoscopy.               Last  Seen  19         Ankle Pain          Review of Systems   Constitutional: Negative for activity change, fever and unexpected weight change. Pneumonia vac   ;    Td ap      Flu vac     Zostavac     HENT:        Partials   Eyes: Negative. Glasses ; Last eye ex   S/P Bilat cataract surgery    Respiratory: Negative for cough, chest tightness, shortness of breath and wheezing. Does not smoke ; No Etoh     Cardiovascular: Negative. No HTN / CAD . No FH either      H/O Atrial Fib > on Coumadin     Gastrointestinal: Negative. Negative for abdominal distention, abdominal pain, blood in stool, constipation and diarrhea. Genitourinary: Negative for dysuria, frequency, menstrual problem, urgency and vaginal discharge. Hysterectromy   One daughter  in accident at 23      Mammogram 9/15     Musculoskeletal: Positive for arthralgias and gait problem. DEXA  9/15  ; osteoporosis     Neurological: Positive for tremors and weakness. Weight: 105 lb (47.6 kg)   Height: 5' 4\" (1.626 m)     Body mass index is 18.02 kg/m². Based upon direct observation of the patient, evaluation of cognition reveals Memory intact       Patient's complete Health Risk Assessment and screening values have been reviewed and are found in Flowsheets. The following problems were reviewed today and where indicated follow up appointments were made and/or referrals ordered. Positive Risk Factor Screenings with Interventions:     General Health:  General  In general, how would you say your health is?: Fair  In the past 7 days, have you experienced any of the following? New or Increased Pain, New or Increased Fatigue, Loneliness, Social Isolation, Stress or Anger?: (!) New or Increased Pain, New or Increased Fatigue, Loneliness, Social Isolation  Do you get the social and emotional support that you need?: Yes  Do you have a Living Will?: Yes  General Health Risk Interventions:  · Pain issues: referral for Home Health Aid  referral ordered for evaluation/treatment of ch pain  · Loneliness: referred to [facility] [address] Habits/Nutrition:  Health Habits/Nutrition  Do you exercise for at least 20 minutes 2-3 times per week?: (!) No  Have you lost any weight without trying in the past 3 months?: No  Do you eat fewer than 2 meals per day?: No  Have you seen a dentist within the past year?: Yes  Body mass index is 18.02 kg/m².   Health Habits/Nutrition Interventions:  · Inadequate physical activity:  patient agrees to increase physical activity as follows: 5 mins walk three times a day as tolerated     Hearing/Vision:  No exam data present  Hearing/Vision  Do you or your family notice any trouble with your hearing?: (!) Yes  Do you have difficulty driving, watching TV, or doing any of your daily activities because of your eyesight?: (!) Yes  Have you had an eye exam within the past year?: Yes  Hearing/Vision Interventions:  · Hearing concerns:  audiology referral

## 2020-01-31 PROBLEM — S72.002A CLOSED LEFT HIP FRACTURE, INITIAL ENCOUNTER (HCC): Status: ACTIVE | Noted: 2020-01-01

## 2020-01-31 NOTE — ED PROVIDER NOTES
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 9/29/08; Intolerant of Fosamax.  On Calcium + Vit D     Osteoporosis     Pain in joint, shoulder region 5/16/2011    Rash 3/25/2019    Resting tremor 3/8/2017    Shoulder pain, right 12/29/2010    Sternal pain 12/18/2013    Urinary frequency 5/16/2011    Vulvovaginal candidiasis 3/13/2019         SURGICAL HISTORY     Past Surgical History:   Procedure Laterality Date    THYROIDECTOMY, PARTIAL  10/04    TUMOR-HURTHLE CELL BENIGN         CURRENTMEDICATIONS       Current Discharge Medication List      CONTINUE these medications which have NOT CHANGED    Details   apixaban (ELIQUIS) 2.5 MG TABS tablet Take 2.5 mg by mouth 2 times daily      pantoprazole (PROTONIX) 40 MG tablet Take 1 tablet by mouth daily as needed (gerd)  Qty: 90 tablet, Refills: 3    Associated Diagnoses: Other iron deficiency anemia      sertraline (ZOLOFT) 50 MG tablet Take 1 tablet by mouth daily  Qty: 30 tablet, Refills: 5    Associated Diagnoses: Anxiety and depression      docusate sodium (COLACE) 100 MG capsule Take 1 capsule by mouth 2 times daily as needed for Constipation  Qty: 60 capsule, Refills: 0      carbidopa-levodopa (SINEMET CR)  MG per extended release tablet Take 1 tablet by mouth 2 times daily  Qty: 60 tablet, Refills: 2      levothyroxine (SYNTHROID) 100 MCG tablet Take 1 tablet by mouth Daily  Qty: 90 tablet, Refills: 3    Associated Diagnoses: Acquired hypothyroidism      calcium carbonate-vitamin D (CALTRATE 600+D) 600-400 MG-UNIT TABS per tab Take 2 tablets by mouth daily  Qty: 180 tablet, Refills: 5    Associated Diagnoses: Age-related osteoporosis without current pathological fracture      acetaminophen (APAP EXTRA STRENGTH) 500 MG tablet Take 2 tablets by mouth every 6 hours as needed for Pain  Qty: 120 tablet, Refills: 3    Associated Diagnoses: Generalized OA      polyethylene glycol (GLYCOLAX) powder DISSOLVE 17 GRAMS IN 8 OUNCES OF LIQUID AND DRINK ONCE A DAY  Qty: 510 g, Refills: 0               ALLERGIES     Nitrofurantoin; Alendronate sodium; and Aspirin    FAMILYHISTORY       Family History   Problem Relation Age of Onset    Cancer Neg Hx     Heart Disease Neg Hx     Diabetes Neg Hx     Stroke Neg Hx           SOCIAL HISTORY       Social History     Tobacco Use    Smoking status: Never Smoker    Smokeless tobacco: Never Used    Tobacco comment: avoid tobacco   Substance Use Topics    Alcohol use: No    Drug use: No       SCREENINGS    [place] Coma Scale  Eye Opening: Spontaneous  Best Verbal Response: Oriented  Best Motor Response: Obeys commands  [place] Coma Scale Score: 15        PHYSICAL EXAM    (up to 7 for level 4, 8 or more for level 5)     ED Triage Vitals [01/31/20 1626]   BP Temp Temp src Pulse Resp SpO2 Height Weight   (!) 93/44 -- -- 74 18 100 % -- 105 lb (47.6 kg)       Physical Exam  Vitals signs and nursing note reviewed. Constitutional:       Appearance: She is well-developed. She is not diaphoretic. HENT:      Head: Normocephalic and atraumatic. Right Ear: External ear normal.      Left Ear: External ear normal.   Eyes:      General:         Right eye: No discharge. Left eye: No discharge. Neck:      Musculoskeletal: Normal range of motion and neck supple. Vascular: No JVD. Cardiovascular:      Rate and Rhythm: Normal rate. Pulses: Normal pulses. Heart sounds: Normal heart sounds. Pulmonary:      Effort: Pulmonary effort is normal. No respiratory distress. Breath sounds: Normal breath sounds. Abdominal:      Palpations: Abdomen is soft. Musculoskeletal: Normal range of motion. Left hip: She exhibits tenderness. Skin:     General: Skin is warm and dry. Coloration: Skin is not pale. Neurological:      Mental Status: She is alert.       Comments: Awake, difficult to understand   Psychiatric:         Behavior: Behavior normal.         DIAGNOSTIC RESULTS   LABS:    Labs Reviewed   URINE RT REFLEX TO CULTURE - Abnormal; Notable for the following components:       Result Value    Clarity, UA CLOUDY (*)     Glucose, Ur 250 (*)     All other components within normal limits    Narrative:     Performed at:  [facility] [address], [place], [facility] [place]   Phone [phone]   TROPONIN - Abnormal; Notable for the following components:    Troponin 0.29 (*)     All other components within normal limits    Narrative:     Performed at:  [facility] [address], [place], [facility] [place]   Phone [phone]  - Abnormal; Notable for the following components:    Pro-BNP 34,706 (*)     All other components within normal limits    Narrative:     Performed at:  [facility] [address], [place], [facility] [place]   Phone [phone]   CBC WITH AUTO DIFFERENTIAL - Abnormal; Notable for the following components:    RBC 3.61 (*)     Hemoglobin 10.3 (*)     Hematocrit 32.0 (*)     RDW 16.3 (*)     Platelets 660 (*)     Neutrophils Absolute 8.8 (*)     Lymphocytes Absolute 0.8 (*)     All other components within normal limits    Narrative:     Performed at:  [facility] [address], [place], [facility] [place]   Phone [phone]   COMPREHENSIVE METABOLIC PANEL - Abnormal; Notable for the following components:    Glucose 163 (*)     BUN 35 (*)     GFR Non- 47 (*)     GFR  57 (*)     Total Protein 6.1 (*)     Total Bilirubin 1.2 (*)     AST 74 (*)     All other components within normal limits    Narrative:     Performed at:  [facility] [address] [place]   Phone [phone]   CK - Abnormal; Notable for the following components: Total CK 2,663 (*)     All other components within normal limits    Narrative:     Performed at:  [facility] [address] [place]   Phone [phone]   PROTIME-INR - Abnormal; Notable for the following components:    Protime 16.5 (*)     INR 1.42 (*)     All other components within normal limits    Narrative:     Performed at:  [facility] [address]   Phone [phone]   CK - Abnormal; Notable for the following components:     Total CK 2,953 (*)     All other components within normal limits    Narrative:     Performed at:  [facility] [address] [place]   Phone [phone]   BASIC METABOLIC PANEL - Abnormal; Notable for the following components:    Chloride 111 (*)     CO2 18 (*)     Glucose 115 (*)     BUN 30 (*)     Calcium 7.9 (*)     All other components within normal limits    Narrative:     Performed at:  [facility] [address]   Phone [phone]   TROPONIN - Abnormal; Notable for the following components:    Troponin 0.15 (*)     All other components within normal limits    Narrative:     Performed at:  [facility] [address]   Phone [phone]   BRAIN NATRIURETIC PEPTIDE - Abnormal; Notable for the following components:    Pro-BNP 16,651 (*)     All other components within normal limits    Narrative:     Performed at:  [facility] [address] [place]   Phone [phone]   TROPONIN - Abnormal; Notable for the following components:    Troponin 0.16 (*)     All other components within normal limits    Narrative:     Performed at:  [facility] [address] [place]   Phone [phone]   CK - Abnormal; Notable for the following components:     Total CK 2,740 (*)     All other components within normal limits    Narrative:     Performed at:  [facility] [address] [place]   Phone [phone]   CBC - Abnormal; Notable for the following components:    RBC 2.86 (*)     Hemoglobin 8.2 (*)     Hematocrit 25.9 (*)     RDW 17.1 (*)     Platelets 191 (*)     All other components within normal limits    Narrative:     Performed at:  [facility] [address] [place]   Phone [phone]   POCT GLUCOSE - Abnormal; Notable for the following components:    POC Glucose 158 (*)     All other components within normal limits    Narrative:     Performed at:  [facility] [address] [place]   Phone [phone]   POCT GLUCOSE - Abnormal; Notable for the following components:    POC Glucose 101 (*)     All other components within normal limits    Narrative:     Performed at:  [facility] [address] [place]   Phone [phone]   POCT GLUCOSE - Abnormal; Notable for the following components:    POC Glucose 101 (*)     All other components within normal limits    Narrative:     Performed at:  [facility] [address] [place]   Phone [phone]   MICROSCOPIC URINALYSIS    Narrative:     Performed at:  [facility]  [address] [facility],  [place], [address]   Phone [phone]   APTT    Narrative:     Performed at:  [facility]  [address],  [place], [address]   Phone [phone]   TSH WITH REFLEX    Narrative:     Performed at:  [facility]  [address] [place] [address], [place] [address]   Phone [phone]   BASIC METABOLIC PANEL   CK   TYPE AND SCREEN    Narrative:     Performed at:  [facility]  [address] [facility] [facility],  [place], [address]   Phone [phone]       All other labs were within normal range or not returned as of this dictation. EKG: All EKG's are interpreted by the Emergency Department Physician in the absence of a cardiologist.  Please see their note for interpretation of EKG. RADIOLOGY:   Non-plain film images such as CT, Ultrasound and MRI are read by the radiologist. Plain radiographic images are visualized and preliminarily interpreted by the  ED Provider with the below findings:        Interpretation per the Radiologist below, if available at the time of this note:    FLUORO FOR SURGICAL PROCEDURES   Final Result      XR HIP LEFT (2-3 VIEWS)   Final Result   Intraprocedural fluoroscopic spot images as above. See separate procedure   report for more information. CT Head WO Contrast   Final Result   No acute intracranial abnormality. Chronic small vessel ischemic disease. No acute cervical spine fracture. Multilevel degenerative changes in the   cervical spine. CT CERVICAL SPINE WO CONTRAST   Final Result   No acute intracranial abnormality. Chronic small vessel ischemic disease. No acute cervical spine fracture. Multilevel degenerative changes in the   cervical spine. XR CHEST PORTABLE   Final Result   No radiographic evidence of acute cardiopulmonary disease.          XR SHOULDER RIGHT (MIN 2 VIEWS)   Final Result   Moderate AC joint hilar silhouettes appear unremarkable. Senescent changes. The lungs appear otherwise clear. No pleural effusion evident. No pneumothorax is seen. No acute osseous abnormality is identified. No radiographic evidence of acute cardiopulmonary disease.            PROCEDURES   Unless otherwise noted below, none     Procedures    CRITICAL CARE TIME   N/A    CONSULTS:  IP CONSULT TO HOSPITALIST  IP CONSULT TO ORTHOPEDIC SURGERY  IP CONSULT TO SOCIAL WORK  IP CONSULT TO SOCIAL WORK      EMERGENCY DEPARTMENT COURSE and DIFFERENTIAL DIAGNOSIS/MDM:   Vitals:    Vitals:    02/01/20 1700 02/01/20 2215 02/02/20 0115 02/02/20 0445   BP: 111/66 120/75 116/69 115/75   Pulse: 76 83 77 82   Resp: 14 12 14 14   Temp: 98.5 °F (36.9 °C) 98.3 °F (36.8 °C) 98.1 °F (36.7 °C) 98.3 °F (36.8 °C)   TempSrc: Temporal Oral Oral Oral   SpO2: 94% 99% 99% 96%   Weight:       Height:           Patient was given thefollowing medications:  Medications   0.9 % sodium chloride infusion ( Intravenous New Bag 2/2/20 0522)   magnesium hydroxide (MILK OF MAGNESIA) 400 MG/5ML suspension 30 mL (has no administration in time range)   acetaminophen (TYLENOL) tablet 650 mg (has no administration in time range)   sertraline (ZOLOFT) tablet 50 mg (0 mg Oral Held 2/1/20 0848)   carbidopa-levodopa (SINEMET CR)  MG per extended release tablet 1 tablet (1 tablet Oral Not Given 2/1/20 2217)   levothyroxine (SYNTHROID) tablet 100 mcg (100 mcg Oral Not Given 2/2/20 0505)   pantoprazole (PROTONIX) tablet 40 mg (40 mg Oral Not Given 2/2/20 0505)   apixaban (ELIQUIS) tablet 2.5 mg (has no administration in time range)   sodium chloride flush 0.9 % injection 10 mL (10 mLs Intravenous Not Given 2/1/20 2217)   sodium chloride flush 0.9 % injection 10 mL (has no administration in time range)   docusate sodium (COLACE) capsule 100 mg (100 mg Oral Not Given 2/1/20 2217)   sennosides-docusate sodium (SENOKOT-S) 8.6-50 MG tablet 1 tablet (1 tablet Oral Not Given 2/1/20 4127)   magnesium hydroxide (MILK OF MAGNESIA) 400 MG/5ML suspension 30 mL (has no administration in time range)   ondansetron (ZOFRAN) injection 4 mg (has no administration in time range)   HYDROcodone-acetaminophen (NORCO) 5-325 MG per tablet 1 tablet (has no administration in time range)     Or   HYDROcodone-acetaminophen (NORCO) 5-325 MG per tablet 2 tablet (has no administration in time range)   morphine (PF) injection 2 mg (has no administration in time range)   0.9 % sodium chloride bolus (0 mLs Intravenous Stopped 1/31/20 1747)   0.9 % sodium chloride bolus (0 mLs Intravenous Stopped 1/31/20 1923)   sodium chloride 0.9 % irrigation (500 mLs Irrigation New Bag 2/1/20 1044)   bupivacaine (PF) (MARCAINE) 0.5 % injection (30 mLs Intradermal Given 2/1/20 1105)   ceFAZolin (ANCEF) 2 g in dextrose 5 % 100 mL IVPB (2 g Intravenous New Bag 2/2/20 5237)       Briefly, this is a 80year old female that presents to the emergency department after being found on the floor by a neighbor that was checking in on her. The patient is difficult to understand. She is complaining of pain to the left hip and right arm. It is unclear how long she was on the ground. She does have some shortening and rotation of the left lower extremity. Per EMS, blood sugar was in the 40's per their arrival.  She was given IV glucose. UA unremarkable for infection. Troponin 0.29, BNP 34 706. CBC shows hemoglobin of 10.3. Otherwise unremarkable. CK 2663. INR 1.42.    CT Head WO Contrast (Final result)   Result time 01/31/20 18:33:04   Final result by Brie Trivedi DO (01/31/20 18:33:04)                Impression:    No acute intracranial abnormality.  Chronic small vessel ischemic disease. No acute cervical spine fracture.  Multilevel degenerative changes in the  cervical spine.               CT CERVICAL SPINE WO CONTRAST (Final result)   Result time 01/31/20 18:33:04   Final result by Brie Trivedi DO (01/31/20 18:33:04) Impression:    No acute intracranial abnormality.  Chronic small vessel ischemic disease. No acute cervical spine fracture.  Multilevel degenerative changes in the  cervical spine. XR CHEST PORTABLE (Final result)   Result time 01/31/20 16:56:07   Final result by Tamanna Reynolds MD (01/31/20 16:56:07)                Impression:    No radiographic evidence of acute cardiopulmonary disease. XR SHOULDER RIGHT (MIN 2 VIEWS) (Final result)   Result time 01/31/20 16:56:35   Final result by Tamanna Reynolds MD (01/31/20 16:56:35)                Impression:    Moderate AC joint osteoarthritis. No acute osseous abnormality. Follow-up imaging recommended if pain persists or worsens following  conservative management. XR HIP LEFT (2-3 VIEWS) (Final result)   Result time 01/31/20 16:54:34   Procedure changed from XR PELVIS (1-2 VIEWS)   Final result by Macy Modi MD (01/31/20 16:54:34)                Impression:    Comminuted intertrochanteric left hip fracture. Orthopedics consulted. Patient admitted to hospitalist services for comminuted intertrochanteric left hip fracture, rhabdomyolysis, and altered mental status. FINAL IMPRESSION      1. Traumatic rhabdomyolysis, initial encounter ([facility] [address]    2. Fall from bed, initial encounter    3. Closed fracture of left hip, initial encounter ([facility] [address]    4.  NSTEMI (non-ST elevated myocardial infarction) [facility])          DISPOSITION/PLAN   DISPOSITION Admitted 01/31/2020 08:34:51 PM      PATIENT REFERREDTO:  Moncho Bran MD  [address] [address]  [phone]            DISCHARGE MEDICATIONS:  Current Discharge Medication List          DISCONTINUED MEDICATIONS:  Current Discharge Medication List                 (Please note that portions of this note were completed with a voice recognition program.  Efforts were made to edit the dictations but occasionally words are mis-transcribed.)    FARHANA Mcclure CNP (electronically signed)           FARHANA Mcclure CNP  02/02/20 3430

## 2020-01-31 NOTE — ED PROVIDER NOTES
I independently performed a history and physical on Daniel Moon. All diagnostic, treatment, and disposition decisions were made by myself in conjunction with the advanced practice provider. Briefly, this is a 80 y.o. female here for AMS s/p fall  She's a bit delirious, and speaks mostly Polish. From what I Can tell, fell last night, anticoagulated on a NOAC for about 1mo, spent night on the floor. On exam, WDWN F< NAD, Heart Irreg Irreg, tremulous (parkinsonian), and left leg is shortened. EKG  EKG read by myself. Dated today at 36. Rate 79 a flutter with 4:1 block. Appears unchanged from 2018. Screenings            MDM  87F with [facility] fall  Checking imaging, including CT head. Probably in rhabdo given that it sounds like she fell last night. Will need admit. Patient Referrals:  No follow-up provider specified. Discharge Medications:  New Prescriptions    No medications on file       FINAL IMPRESSION  1. Traumatic rhabdomyolysis, initial encounter ([facility] [address]    2. Fall from bed, initial encounter    3. Closed fracture of left hip, initial encounter ([facility] [address]    4. NSTEMI (non-ST elevated myocardial infarction) ([facility])        Blood pressure 106/67, pulse 97, resp. rate 19, weight 105 lb (47.6 kg), last menstrual period 09/23/1983, SpO2 99 %, not currently breastfeeding. [facility] please see documentation by advanced practice provider, Abdulkadir Leyva.         Azar Owen MD  01/31/20 5815

## 2020-01-31 NOTE — ED NOTES
Pt awake, alert, and oriented x 2 but able to recall some events of last night. Oral care given due to very dry mucous membranes, and sip of water given as well. Pt clearing throat and able to expectorate large amounts of mucous with chicken, which pt stated she ate \"last night but nothing today. \"  Skin pale, warm, and dry. Rhythm afib/aflutter with rate from 70s - 110s (atrial rate in atrial flutter with variable block). Sp02 = 100% on RA. BP as noted. Niece, Amrita Proctor, and neighbor, Eamon Duncan, both at bedside. Pt resting with eyes closed, resps easy and unlabored. Quintanilla patent. + abrasion noted to left wrist area. Pt c/o pain earlier in right shoulder and left hip; left leg shortened. Left pedal pulse weak and thready with cap refill less than 2 seconds. Admission pending.       Colette Metcalf, LANI  01/31/20 8021

## 2020-02-01 NOTE — PROGRESS NOTES
Full consult note to follow    Xrays reviewed. Left intertrochanteric hip fracture    Jeni Mead is a 80 y.o. female who presented to [facility] ER after being found down. Unkown how long she was down. Has rhabdomyolysis. History of Parkinson's, osteoporosis, Afib on Elaquis. Plan:  --Recommend operative fixation of left hip fracture  --Pain control  --Bedrest  --Hold anticoagulation. --Medical evaluation, optimization, and clearance   --NPO after midnight  --Plan for OR tomorrow if medically cleared.       Electronically signed by Lilian Ortez MD on 1/31/2020 at 8:50 PM

## 2020-02-01 NOTE — ED NOTES
Pt with small soft brown BM. Amanda care given; pt turned side-to-side by RN's; no skin breakdown to coccyx/buttocks/sacrum noted.        [address], RN  01/31/20 2127

## 2020-02-01 NOTE — OP NOTE
DATE OF SURGERY:  2/1/20    PREOPERATIVE DIAGNOSIS: Left intertrochanteric hip fracture. POSTOPERATIVE DIAGNOSIS: Left intertrochanteric hip fracture. PROCEDURE: Left hip intramedullary nailing. ASSISTANT: Sabra Matrinez    ANESTHESIA: General.    ESTIMATED BLOOD LOSS: <50 mL. COMPLICATIONS: None. DISPOSITION: To PACU in good condition. INDICATIONS FOR PROCEDURE: The patient is a 80 y.o. female  who sustained a fall onto the left hip. She was down on the ground for almost a day. The patient was brought to the [facility] ER and was discovered to have a left intertrochanteric hip fracture. The patient was subsequently admitted to the hospital for definitive treatment. An orthopedic consultation was obtained. I recommended operative fixation of the hip. The patient and /or family was explained the risks, benefits,  complications, alternatives of the procedure and they subsequently provided  written informed consent for the procedure. DESCRIPTION OF PROCEDURE: The patient was seen in the preoperative holding  area. The left hip was marked. The patient was seen by the Anesthesia service. The patient was then brought to the operating room. The patient was induced under general anesthesia on the bed. The patient was given  prophylactic preoperative IV antibiotics. The patient was then transferred onto the fracture table in the supine position. Care was taken to ensure that all  bony prominences were well padded. The nonoperative leg was placed in the well leg  argueta. The operative leg was placed in a traction boot. We then performed a closed  reduction on the fracture table with fluoroscopy. Once we had anatomic  reduction, we then sterilely prepped and draped the operative hip in the usual  fashion. A time-out was taken where the patient, the operative extremity and  the operative procedure were once again verified.  We then made an  approximately 3 cm incision just superior to the with 2-0 Vicryl suture in a simple inverted interrupted fashion. The skin was then closed with staples. Xeroform gauze was then applied. Marcaine 0.5% was injected for local anesthesia. A 4 x 4 gauze, ABD pads and tape were then applied. The patient was then awakened from general anesthesia and transferred onto  her hospital bed and transported to the PACU for recovery. The patient  tolerated the procedure well and without any complications. PLANS: The patient will be recovered in the PACU and then be readmitted to  the floor. The patient is Weight bearing as tolerated on the operative leg. The patient will have PT and OT consult. The patient will have 24 hours of prophylactic IV antibiotics. The patient will have DVT prophylaxis.         Electronically signed by Rema Cooks, MD on 2/1/2020 at 11:17 AM

## 2020-02-01 NOTE — PROGRESS NOTES
Pt arrived to [address] [facility]. VSS. Quintanilla remains in place and patent. Skin assessment completed. Bruising noted to left hip and buttock. Slight redness noted to coccyx, mepilex applied. Laceration noted to left wrist.       4 Eyes Skin Assessment     The patient is being assess for  Admission    I agree that 2 RN's have performed a thorough Head to Toe Skin Assessment on the patient. ALL assessment sites listed below have been assessed. Areas assessed by both nurses:   [x]   Head, Face, and Ears   [x]   Shoulders, Back, and Chest  [x]   Arms, Elbows, and Hands   [x]   Coccyx, Sacrum, and IschIum  [x]   Legs, Feet, and Heels        Does the Patient have Skin Breakdown?   No         Nicholas Prevention initiated: yes  Wound Care Orders initiated:  No      [facility] nurse consulted for Pressure Injury (Stage 3,4, Unstageable, DTI, NWPT, and Complex wounds), New and Established Ostomies:  No      Nurse 1 eSignature: Electronically signed by Marya Argueta RN on 1/31/20 at 11:09 PM    **SHARE this note so that the co-signing nurse is able to place an eSignature**    Nurse 2 eSignature: Electronically signed by Priscilla Stout RN on 1/31/20 at 11:11 PM

## 2020-02-01 NOTE — PROGRESS NOTES
Incentive Spirometry education and demonstration completed by Respiratory Therapy Yes      Response to education: Poor     Teaching Time: 5 minutes    Minimum Predicted Vital Capacity - 547 mL. Patient's Actual Vital Capacity - 0 mL.  Turning over to Nursing for routine follow-up No.    Comments: pt unable to follow instructions    Electronically signed by Emily Mejía RCP on 2/1/2020 at 1:33 PM

## 2020-02-01 NOTE — H&P
Hospital Medicine History & Physical      PCP: Lieutenant Makenzie MD    Date of Admission: 1/31/2020    Date of Service: Pt seen/examined on 01/31/20 and Admitted to Inpatient with expected LOS greater than two midnights due to medical therapy. Chief Complaint:  Left hip pain      History Of Present Illness:  Gaby Stevenson is 80 y.o. female who presented with complaint of left hip pain. Symptom onset was acute for a time period of 1 day. The severity is described as moderate. The course of his symptoms over time is constant. The symptoms improved with rest and worsened with movement. The patient's symptom is associated with lethargy, generalized weakness. Gaby Stevenson is 80 y.o. female with history of parkinson disease, Afib, hypothyroid and depression. She is brought to the ED by EMS after she was found down at home. Her niece called several times today but the patient was not picking up the phone. Niece then called the neighbor to check on patient. Patient was found down next to her bed on the floor, unable to get up. She fell out of bed. Patient is lethargic at the time of interview. Her speech is not comprehensible. She says her pain on the left hip is controlled.    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    Allergic rhinitis, seasonal     Atrial fibrillation (HCC)     Basal cell carcinoma of mandible 10/12/2016    Bradycardia     Cervical radicular pain 12/29/2010    Chest pain 6/7/2015    Chronic kidney disease     Chronic kidney disease, stage II (mild) 5/16/2011    Encounter for long-term (current) use of other medications 5/16/2011    Fatigue 7/10/2015    Goiter 5/16/2011 Brisk,< 3 seconds   Peripheral Pulses: +2 palpable, equal bilaterally       Labs:     Recent Labs     01/31/20  1713   WBC 10.7   HGB 10.3*   HCT 32.0*   *     Recent Labs     01/31/20  1713      K 4.8      CO2 21   BUN 35*   CREATININE 1.1   CALCIUM 9.0     Recent Labs     01/31/20  1713   AST 74*   ALT 35   BILITOT 1.2*   ALKPHOS 42     Recent Labs     01/31/20  1713   INR 1.42*     Recent Labs     01/31/20  1713   CKTOTAL 2,663*   TROPONINI 0.29*       Urinalysis:      Lab Results   Component Value Date    NITRU Negative 01/31/2020    WBCUA 1 01/31/2020    BACTERIA RARE 08/18/2015    RBCUA 4 01/31/2020    BLOODU Negative 01/31/2020    SPECGRAV 1.023 01/31/2020    GLUCOSEU 250 01/31/2020       Radiology:     CXR: I have reviewed the CXR with the following interpretation: No acute cardiopulmonary disease. EKG:  I have reviewed the EKG with the following interpretation: Afib, rate controlled     CT Head WO Contrast   Final Result   No acute intracranial abnormality. Chronic small vessel ischemic disease. No acute cervical spine fracture. Multilevel degenerative changes in the   cervical spine. CT CERVICAL SPINE WO CONTRAST   Final Result   No acute intracranial abnormality. Chronic small vessel ischemic disease. No acute cervical spine fracture. Multilevel degenerative changes in the   cervical spine. XR CHEST PORTABLE   Final Result   No radiographic evidence of acute cardiopulmonary disease. XR SHOULDER RIGHT (MIN 2 VIEWS)   Final Result   Moderate AC joint osteoarthritis. No acute osseous abnormality. Follow-up imaging recommended if pain persists or worsens following   conservative management. XR HIP LEFT (2-3 VIEWS)   Final Result   Comminuted intertrochanteric left hip fracture.              ASSESSMENT:    Principal Problem:    Closed left hip fracture, initial encounter [facility])  Active Problems:    Depression    Atrial fibrillation [facility])    Hypothyroidism    Parkinson's disease ([facility] [address]  Resolved Problems:    * No resolved hospital problems. *        PLAN:    1. Closed left proximal intertrochanteric hip fracture - consult orthopedic surgery. Pain control. 2. Acute rhabdomyolysis, mild - continue IV normal saline at 125 ml/hr. Trend CK total and UOP. 3. Elevated trop - doubt NSTEMI. Repeat trop. She denies chest pain. 4. Afib, rate controlled - hold eliquis as she is a candidate for ORIF for left hip fracture. 5. Parkinson's disease - continue home meds sinemet ER bid     6. Depression, stable - on Zoloft     7. Hypothyroid - clinically euthyroid on oral replacement therapy. Check TSH and continue sythroid per home regimen       DVT Prophylaxis: resume eliquis post op   Diet: DIET GENERAL;  Diet NPO, After Midnight  Code Status: Full Code    PT/OT Eval Status: Not ordered     Dispo - Admit to Tyrell Faust MD    Thank you Anne-Marie Bird MD for the opportunity to be involved in this patient's care. If you have any questions or concerns please feel free to contact me at [phone]. No

## 2020-02-01 NOTE — PROGRESS NOTES
Call placed to pt's [facility] paris [phone]. Voicemail message left, requested call back to assist in completion of admission questions and home medications.

## 2020-02-01 NOTE — PROGRESS NOTES
New order received from Dr. Deandre Tsang for telemetry monitoring based on pt's EKG in ER, which was interpreted by Dr. Cecil Ayoub as [place] flutter with 4:1 block\". Pt has history of atrial fibrillation and parkinson's. Telemetry monitor placed at this time, rate 85, rhythm A. Fib with artifact from continuous tremors. Confirmed with 5C monitor tech, Estefania Parson. SpO2 reading 83% on room air by continuous finger pulse oximetry. Hands noted to be cold. Adhesive pulse ox monitor placed on pt's forehead, now reading 100% on room air. Will monitor. Nasal cannula tubing placed in room if needed. Pt began coughing, able to produce moderate amount of thick, yellow, purulent sputum.

## 2020-02-01 NOTE — CONSULTS
3/25/2019    Resting tremor 3/8/2017    Shoulder pain, right 12/29/2010    Sternal pain 12/18/2013    Urinary frequency 5/16/2011    Vulvovaginal candidiasis 3/13/2019       Past Surgical History:   Procedure Laterality Date    THYROIDECTOMY, PARTIAL  10/04    TUMOR-HURTHLE CELL BENIGN       Current Facility-Administered Medications   Medication Dose Route Frequency Provider Last Rate Last Dose    fentaNYL (SUBLIMAZE) injection 25 mcg  25 mcg Intravenous Once FARHANA Lujan - CNP   Stopped at 01/31/20 1832    0.9 % sodium chloride infusion   Intravenous Continuous Rowena Burns  mL/hr at 01/31/20 2001       Current Outpatient Medications   Medication Sig Dispense Refill    apixaban (ELIQUIS) 2.5 MG TABS tablet Take 2.5 mg by mouth 2 times daily      pantoprazole (PROTONIX) 40 MG tablet Take 1 tablet by mouth daily as needed (gerd) 90 tablet 3    sertraline (ZOLOFT) 50 MG tablet Take 1 tablet by mouth daily 30 tablet 5    docusate sodium (COLACE) 100 MG capsule Take 1 capsule by mouth 2 times daily as needed for Constipation 60 capsule 0    carbidopa-levodopa (SINEMET CR)  MG per extended release tablet Take 1 tablet by mouth 2 times daily 60 tablet 2    levothyroxine (SYNTHROID) 100 MCG tablet Take 1 tablet by mouth Daily 90 tablet 3    calcium carbonate-vitamin D (CALTRATE 600+D) 600-400 MG-UNIT TABS per tab Take 2 tablets by mouth daily 180 tablet 5    acetaminophen (APAP EXTRA STRENGTH) 500 MG tablet Take 2 tablets by mouth every 6 hours as needed for Pain 120 tablet 3    polyethylene glycol (GLYCOLAX) powder DISSOLVE 17 GRAMS IN 8 OUNCES OF LIQUID AND DRINK ONCE A DAY (Patient taking differently: prn) 510 g 0       Allergies   Allergen Reactions    Nitrofurantoin Nausea And Vomiting    Alendronate Sodium      Bloated stomach    Aspirin Other (See Comments)     Upsets her stomach       Social History     Socioeconomic History    Marital status:       Spouse swelling and is no ecchymosis. She is tender to palpation over the left hip, and otherwise nontender over the remainder of the extremity. Range of motion is decreased secondary to pain over the left hip, and normal in the right hip. The skin overlying the left hip is intact without evidence of lesion, laceration or abrasion. Distal pulses are 2+ and symmetric bilaterally. Sensation is grossly intact to light touch and symmetric bilaterally. Dorsiflexion / plantarflexion intact bilaterally. On evaluation of her right upper extremity, there is no obvious deformity. There is no swelling and is no ecchymosis. She is tender to palpation lateral acromial, and otherwise nontender over the remainder of the extremity. Range of motion not assessed secondary to pain of right shoulder. The skin overlying the right shoulder is intact without evidence of lesion, laceration or abrasion. Distal pulses are 2+ and symmetric bilaterally. Sensation is grossly intact to light touch and symmetric bilaterally. Moves fingers. Secondary skeletal survey negative. No tenderness to palpation in bilateral arms, right leg. There is no pain with passive motion of neck, shoulders, elbows, wrists, hips, knees, ankles. Left hip Xrays: reviewed. Intertrochanteric hip fracture    Right shoulder xrays: reviewed. No obvious fracture or dislocation. AC joint arthritis. ?humeral head elevation.       CBC:   Lab Results   Component Value Date    WBC 10.7 01/31/2020    RBC 3.61 01/31/2020    HGB 10.3 01/31/2020    HCT 32.0 01/31/2020    MCV 88.7 01/31/2020    MCH 28.6 01/31/2020    MCHC 32.2 01/31/2020    RDW 16.3 01/31/2020     01/31/2020    MPV 9.2 01/31/2020     PT/INR:    Lab Results   Component Value Date    PROTIME 20.8 12/05/2019    PROTIME 32.7 08/03/2015    PROTIME 15.9 07/22/2015    INR 1.78 12/05/2019     Chem Basic:   Lab Results   Component Value Date     01/31/2020    K 4.8 01/31/2020    K 4.3

## 2020-02-01 NOTE — PROGRESS NOTES
Patient received to PACU in stable condition. VSS. No signs of distress. Dressing to hip clean dry and intact. Ice applied. Will continue to monitor.

## 2020-02-01 NOTE — ANESTHESIA PRE PROCEDURE
[facility] Department of Anesthesiology  Pre-Anesthesia Evaluation/Consultation       Name:  Jeremy Bueno  : 1932  Age:  80 y.o. MRN:  [id number]  Date: 2020           Surgeon: Surgeon(s):  Ninoska Romeo MD    Procedure: Procedure(s):  HIP OPEN REDUCTION INTERNAL FIXATION  LEFT GAMMA     Allergies   Allergen Reactions    Nitrofurantoin Nausea And Vomiting    Alendronate Sodium      Bloated stomach    Aspirin Other (See Comments)     Upsets her stomach     Patient Active Problem List   Diagnosis    Anxiety state    Depression    Atrial fibrillation ([address]    Essential hypertension    Osteoporosis    Allergic rhinitis, seasonal    Hypothyroidism    Melena    Anemia    Warfarin-induced coagulopathy ([address]    Parkinson's disease ([address]    Closed left hip fracture, initial encounter ([facility] [address]     Past Medical History:   Diagnosis Date    Abdominal pain     Abnormal mammogram 2011    Acute cystitis with hematuria 3/8/2017    Adhesive capsulitis of shoulder 2010    Allergic rhinitis, seasonal     Atrial fibrillation (HCC)     Basal cell carcinoma of mandible 10/12/2016    Bradycardia     Cervical radicular pain 2010    Chest pain 2015    Chronic kidney disease     Chronic kidney disease, stage II (mild) 2011    Encounter for long-term (current) use of other medications 2011    Fatigue 7/10/2015    Goiter 2011    Hip pain, left 12/3/2013    Hypercholesteremia     Hypercholesterolemia 2011    Hypotension     Long term current use of anticoagulant therapy 2015    Neoplasm of thyroid      replace inactive diagnosis    Neoplasm of uncertain behavior of skin 12/3/2012    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 08; Intolerant of Fosamax.  On Calcium + Vit D     Osteoporosis     Pain in joint, shoulder region García Almodovar  mL/hr at 20 0403      sodium chloride flush 0.9 % injection 10 mL  10 mL Intravenous 2 times per day Nico Kelly MD        sodium chloride flush 0.9 % injection 10 mL  10 mL Intravenous PRN Nico Kelly MD        magnesium hydroxide (MILK OF MAGNESIA) 400 MG/5ML suspension 30 mL  30 mL Oral Daily PRN Nico Kelly MD        ondansetron (ZOFRAN) injection 4 mg  4 mg Intravenous Q6H PRN Nico Kelly MD        acetaminophen (TYLENOL) tablet 650 mg  650 mg Oral Q4H PRN Nico Kelly MD       Maine Treadwellell Starring ON 2/3/2020] apixaban (ELIQUIS) tablet 2.5 mg  2.5 mg Oral BID Nico Kelly MD        sertraline (ZOLOFT) tablet 50 mg  50 mg Oral Daily Nico Kelly MD   Stopped at 20 0848    carbidopa-levodopa (SINEMET CR)  MG per extended release tablet 1 tablet  1 tablet Oral BID Nico Kelly MD   Stopped at 20 0848    docusate sodium (COLACE) capsule 100 mg  100 mg Oral BID Nico Kelly MD   Stopped at 20 0848    levothyroxine (SYNTHROID) tablet 100 mcg  100 mcg Oral Daily Nico Kelly MD        pantoprazole (PROTONIX) tablet 40 mg  40 mg Oral QAM AC Nico Kelly MD         Vital Signs (Current)   Vitals:    20 0845   BP: 100/62   Pulse: 75   Resp: 16   Temp: 98.4 °F (36.9 °C)   SpO2: 100%     Vital Signs Statistics (for past 48 hrs)     Temp  Av.8 °F (36.6 °C)  Min: 97.4 °F (36.3 °C)   Min taken time: 20 0615  Max: 98.6 °F (37 °C)   Max taken time: 20 1700  Pulse  Av.7  Min: 79   Min taken time: 20 1952  Max: 139   Max taken time: 20 1815  Resp  Av.6  Min: 12   Min taken time: 20 0845  Max: 22   Max taken time: 20 2030  BP  Min: 79/45   Min taken time: 20  Max: 120/73   Max taken time: 20 193  MAP (mmHg)  Av.2  Min: 48   Min taken time: 20  Max: 94   Max taken time: 20  SpO2  Av.3 %  Min: 97 %   Min taken time: 20 2200  Max: 100 %   Max taken time: 02/01/20 0845    BP Readings from Last 3 Encounters:   02/01/20 100/62   01/23/20 126/66   12/05/19 120/71     BMI  Body mass index is 18.09 kg/m². Estimated body mass index is 18.09 kg/m² as calculated from the following:    Height as of this encounter: 5' 4\" (1.626 m). Weight as of this encounter: 105 lb 6.4 oz (47.8 kg).     CBC   Lab Results   Component Value Date    WBC 10.7 01/31/2020    RBC 3.61 01/31/2020    HGB 10.3 01/31/2020    HCT 32.0 01/31/2020    MCV 88.7 01/31/2020    RDW 16.3 01/31/2020     01/31/2020     CMP    Lab Results   Component Value Date     02/01/2020    K 4.5 02/01/2020    K 4.3 04/12/2019     02/01/2020    CO2 18 02/01/2020    BUN 30 02/01/2020    CREATININE 0.8 02/01/2020    GFRAA >60 02/01/2020    GFRAA >60 12/03/2012    AGRATIO 1.5 01/31/2020    LABGLOM >60 02/01/2020    GLUCOSE 115 02/01/2020    PROT 6.1 01/31/2020    PROT 7.0 09/18/2012    CALCIUM 7.9 02/01/2020    BILITOT 1.2 01/31/2020    ALKPHOS 42 01/31/2020    AST 74 01/31/2020    ALT 35 01/31/2020     BMP    Lab Results   Component Value Date     02/01/2020    K 4.5 02/01/2020    K 4.3 04/12/2019     02/01/2020    CO2 18 02/01/2020    BUN 30 02/01/2020    CREATININE 0.8 02/01/2020    CALCIUM 7.9 02/01/2020    GFRAA >60 02/01/2020    GFRAA >60 12/03/2012    LABGLOM >60 02/01/2020    GLUCOSE 115 02/01/2020     POCGlucose  Recent Labs     01/31/20  1713 02/01/20  0302   GLUCOSE 163* 115*      Coags    Lab Results   Component Value Date    PROTIME 16.5 01/31/2020    PROTIME 32.7 08/03/2015    PROTIME 15.9 07/22/2015    INR 1.42 01/31/2020    APTT 28.3 97/82/1520     HCG (If Applicable) No results found for: PREGTESTUR, PREGSERUM, HCG, HCGQUANT   ABGs No results found for: PHART, PO2ART, HJV1JXL, XZL5WTK, BEART, C6ORIWEQ   Type & Screen (If Applicable)  No results found for: LABABO, LABRH                         BMI: Wt Readings from Last 3 Encounters:       NPO Status: 8 hours Ana Alberts MD  February 1, 2020  9:37 AM

## 2020-02-02 NOTE — PROGRESS NOTES
[id number] [facility] Orthopedic Surgery   Progress Note      SUBJECTIVE:  Patient mumbles. Unable to make out anything she is saying except for \"no. \"      OBJECTIVE:  /67   Pulse 77   Temp 98.2 °F (36.8 °C) (Oral)   Resp 14   Ht 5' 4\" (1.626 m)   Wt 105 lb 6.4 oz (47.8 kg)   LMP 09/23/1983   SpO2 90%   BMI 18.09 kg/m²   Patient is awake, alert, and in no acute distress. She is sitting in recliner. Dressing / incision clean, dry, intact. Sensation is intact to light touch throughout the Left leg. Dorsiflexion / plantarflexion intact. The Left leg is warm and well perfused. CBC:   Lab Results   Component Value Date    WBC 7.7 02/02/2020    RBC 2.86 02/02/2020    HGB 8.2 02/02/2020    HCT 25.9 02/02/2020    MCV 90.6 02/02/2020    MCH 28.8 02/02/2020    MCHC 31.8 02/02/2020    RDW 17.1 02/02/2020     02/02/2020    MPV 9.4 02/02/2020       PT/INR:    Lab Results   Component Value Date    PROTIME 16.5 01/31/2020    PROTIME 32.7 08/03/2015    PROTIME 15.9 07/22/2015    INR 1.42 01/31/2020       Chem Basic:   Lab Results   Component Value Date     02/02/2020    K 4.5 02/02/2020    K 4.3 04/12/2019     02/02/2020    CO2 19 02/02/2020    GLUCOSE 116 02/02/2020    BUN 25 02/02/2020    CREATININE 0.7 02/02/2020           ASSESSMENT:  POD#1 s/p left hip intramedullary nailing  Parkinson's disease  Afib on Eliquis  Osteoporosis      PLAN:    --Pain control  --Resume Eliquis  --Finish prophylactic IV abs  --PT/OT  --WBAT LLE  --Dispo planning.  Will need ECF          Electronically signed by Briseida Brunner MD on 2/2/2020 at 10:12 AM

## 2020-02-02 NOTE — PROGRESS NOTES
Prognosis  Prognosis for safe diet advancement: good    Individuals consulted  Consulted and agree with results and recommendations: RN    Education  Patient Education: Role of SLP, results of assessment and general dysphagia plan of care reviewed with patient following evaluation. Patient Education Response: Needs reinforcement    Safety Devices in place: Yes  Type of devices: Call light within reach; Chair alarm in place;Nurse notified       Therapy Time  30 minutes    Hailey Gutierrez M.A./CCC-SLP#[id number]  Speech-Language Pathologist  2/2/2020 9:33 AM

## 2020-02-02 NOTE — PROGRESS NOTES
[facility]    2/2/2020 11:24 AM        Name: Dennie Rhyme . Admitted: 1/31/2020  Primary Care Provider: Dylon Laws MD (Tel: [phone])      Subjective:  Patient is 79 yo female with hx atrial fib, CKD, Parkinson's disease. She presented to hospital with left hip pain after being found down at home next to her bed. Hip xray showed comminuted intertrochanteric left hip fracture. She underwent left hip intramedullary nailing 2/1/20. Presently resting in bed. Had been up in chair earlier this am. Patient says she is okay. HOB nearly flat, denies shortness of breath, chest pain, operative pain controlled. Plan is for ECF on DC.      Reviewed interval ancillary notes    Current Medications  apixaban (ELIQUIS) tablet 2.5 mg, BID  sodium chloride flush 0.9 % injection 10 mL, 2 times per day  sodium chloride flush 0.9 % injection 10 mL, PRN  docusate sodium (COLACE) capsule 100 mg, BID  sennosides-docusate sodium (SENOKOT-S) 8.6-50 MG tablet 1 tablet, BID  magnesium hydroxide (MILK OF MAGNESIA) 400 MG/5ML suspension 30 mL, Daily PRN  ondansetron (ZOFRAN) injection 4 mg, Q6H PRN  HYDROcodone-acetaminophen (NORCO) 5-325 MG per tablet 1 tablet, Q4H PRN    Or  HYDROcodone-acetaminophen (NORCO) 5-325 MG per tablet 2 tablet, Q4H PRN  morphine (PF) injection 2 mg, Q3H PRN  0.9 % sodium chloride infusion, Continuous  magnesium hydroxide (MILK OF MAGNESIA) 400 MG/5ML suspension 30 mL, Daily PRN  acetaminophen (TYLENOL) tablet 650 mg, Q4H PRN  sertraline (ZOLOFT) tablet 50 mg, Daily  carbidopa-levodopa (SINEMET CR)  MG per extended release tablet 1 tablet, BID  levothyroxine (SYNTHROID) tablet 100 mcg, Daily  pantoprazole (PROTONIX) tablet 40 mg, QAM AC        Objective:  /67   Pulse 77   Temp 98.2 °F (36.8 °C) (Oral)   Resp 14   Ht 5' 4\" (1.626 m)   Wt 105 lb 6.4 oz (47.8 kg)   LMP check iron studies.      3. Acute rhabdomyolysis. Improved. CK trending down max 2953->1844. Renal numbers stable. Taking po fluids, will cut back on IV rate. Recheck CK and BMp in am.       4. Chronic atrial fib. Controlled rate. On no rate controlling drugs. Eliquis has been resumed.      5. Parkinson's disease. Continue Sinemet.       6. Depression, stable. Continue Zoloft      7. Hypothyroid. TSH 2.24. Continue levothyroxine.         Diet: Dietary Nutrition Supplements: Standard High Calorie Oral Supplement  DIET GENERAL; Dysphagia Minced and Moist; Mildly Thick (Nectar)  Code:Full Code  DVT PPX: on [address], APRN - CNP   2/2/2020 11:24 AM

## 2020-02-02 NOTE — PROGRESS NOTES
Occupational Therapy   Occupational Therapy Initial Assessment  Date: 2020   Patient Name: Josh Parish  MRN: [id number]     : 1932    Date of Service: 2020    Discharge Salinas Reza scored a  on the AM-PAC ADL Inpatient form. Current research shows that an AM-PAC score of 17 or less is typically not associated with a discharge to the patient's home setting. Based on the patients AM-PAC score and their current ADL deficits, it is recommended that the patient have 3-5 sessions per week of Occupational Therapy at d/c to increase the patients independence. OT Equipment Recommendations  Equipment Needed: No    Assessment   Performance deficits / Impairments: Decreased functional mobility ; Decreased high-level IADLs;Decreased ADL status; Decreased endurance;Decreased cognition;Decreased balance;Decreased posture;Decreased safe awareness  Assessment: Pt presents with the above deficits impacting daily occupational performance and would benefit from continued skilled OT services. Treatment Diagnosis: Decreased mobility, ADL status, ADL transfers, cognition, high level IADL's, endurance, balance, posture, safety awareness associated with s/p ORIF L hip fracture. Prognosis: Fair  Decision Making: Medium Complexity  OT Education: OT Role;Plan of Care;Orientation;Transfer Training;Equipment;Precautions  REQUIRES OT FOLLOW UP: Yes  Activity Tolerance  Activity Tolerance: Patient Tolerated treatment well;Treatment limited secondary to decreased cognition  Safety Devices  Safety Devices in place: Yes  Type of devices: All fall risk precautions in place; Patient at risk for falls;Call light within reach; Left in chair;Nurse notified; Chair alarm in place;Gait belt  Restraints  Initially in place: No           Patient Diagnosis(es): The primary encounter diagnosis was Traumatic rhabdomyolysis, initial encounter ([facility] [address]  Diagnoses of Fall from bed, initial encounter, Closed

## 2020-02-02 NOTE — PROGRESS NOTES
Physical Therapy    Facility/Department: [facility]  Initial Assessment    NAME: Daniel Moon  : 1932  MRN: [id number]    Date of Service: 2020    Discharge Recommendations:  Daniel Moon scored a 7/24 on the AM-PAC short mobility form. Current research shows that an AM-PAC score of 17 or less is typically not associated with a discharge to the patient's home setting. Based on the patients AM-PAC score and their current functional mobility deficits, it is recommended that the patient have 3-5 sessions per week of Physical Therapy at d/c to increase the patients independence. 3-5 sessions per week   PT Equipment Recommendations  Equipment Needed: No    Assessment   Body structures, Functions, Activity limitations: Decreased functional mobility ; Decreased strength;Decreased endurance;Decreased safe awareness;Decreased balance  Assessment: Patient not at baseline function and would benefit from skilled PT to address above deficits and facilitate return to baseline function. Limited ability to participate in therapy this date due to delayed responses  Treatment Diagnosis: decreased functional mobility, impaired gait, decreased balance  Prognosis: Good  Decision Making: Medium Complexity  Clinical Presentation: evolving  PT Education: Goals;PT Role;Plan of Care;Precautions;Weight-bearing Education  Patient Education: d/c recommendations  Barriers to Learning: cognitive  REQUIRES PT FOLLOW UP: Yes  Activity Tolerance  Activity Tolerance: Patient limited by pain; Patient limited by cognitive status  Activity Tolerance: delayed responses, decreased command following        Patient Diagnosis(es): The primary encounter diagnosis was Traumatic rhabdomyolysis, initial encounter ([facility] [address] Diagnoses of Fall from bed, initial encounter, Closed fracture of left hip, initial encounter [facility]), and NSTEMI (non-ST elevated myocardial infarction) [facility]) were also pertinent to this visit.      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 has a past surgical history that includes Thyroidectomy, partial (10/04). Restrictions  Restrictions/Precautions  Restrictions/Precautions: General Precautions, Fall Risk, Weight Bearing(HIGH FALL RISK)  Required Braces or Orthoses?: No  Lower Extremity Weight Bearing Restrictions  Left Lower Extremity Weight Bearing: Weight Bearing As Tolerated  Position Activity Restriction  Other position/activity restrictions: Activity--POD#1: 1)  Up to bedside chair at least daily as tolerated. 2)  Ambulate in room progressing to hallway with assistive device four times daily (including PT) when PT advises. 3)  Bathroom privileges with assistance.   Vision/Hearing  Vision: Impaired  Vision Exceptions: Wears glasses for distance  Hearing: Exceptions to [facility]  Hearing Exceptions: Hard of hearing/hearing concerns     Subjective  General  Chart Reviewed: Yes  Family / Caregiver Present: No  Diagnosis: L hip fracture, s/p ORIF  Follows Commands: Impaired  Other (Comment): increased cues, delayed response  General Comment  Comments: supine in bed upon arrival with bed alarm on  Subjective  Subjective: Denied pain at rest. Reported L hip pain with mobility but unable to rate - nursing alerted  Pain Screening  Patient Currently in Pain: Denies          Orientation  Orientation  Overall Orientation Status: Impaired  Orientation Level: Oriented to time;Oriented to situation;Oriented to person;Disoriented to place  Social/Functional History  Social/Functional History  Lives With: Alone  Type of Home: House(tri-level home)  Home Layout: Multi-level, Bed/Bath upstairs(6 steps between levels)  Home Access: Level entry  Bathroom Shower/Tub: Walk-in shower  Bathroom Toilet: Standard  Bathroom Equipment: Grab bars in shower  Home Equipment: Cane  ADL Assistance: Independent  Homemaking Assistance: Independent  Ambulation Assistance: Independent(with cane)  Transfer Assistance: Independent  Additional Comments: reported 1 recent fall. Patient had difficulty reporting home environment. Confirmed with previous admission    Objective          AROM RLE (degrees)  RLE AROM: WFL  AROM LLE (degrees)  LLE AROM : (significantly limited -difficult to assess)  Strength Other  Other: unable to formally assess due to decreased command following        Bed mobility  Supine to Sit: 2 Person assistance;Dependent/Total(total assistance of 2)  Scooting: Dependent/Total  Transfers  Sit to Stand: Dependent/Total;2 Person Assistance(max A of 2, poor initiation of task)  Stand to sit: 2 Person Assistance(max A of 2, poor eccentric control)  Ambulation 1  Surface: level tile  Device: Rolling Walker  Assistance: Dependent/Total(max A of 1, min A of 1)  Quality of Gait: posterior lean with flexed posture, assist for walker management, assist for weight shifting, decreased WBing on LLE  Distance: 2-3 feet  Comments: Reported dizziness with decreased responsiveness once in chair. SpO2 86% on 2L O2 - increased to 3 L O2 to receover to 96%. BP initially 91/54. Chair reclined with LE elevated and BP increased to 106/63 with report of decreased dizziness and improved responsiveness. Discussed with nursing. Balance  Sitting - Static: Fair(progressed to CGA seated EOB balance)  Sitting - Dynamic: Fair  Standing - Static: Poor  Standing - Dynamic: Poor        Plan   Plan  Times per week: 7  Times per day: Daily  Current Treatment Recommendations: Strengthening, Balance Training, Transfer Training, Endurance Training, Gait Training, Safety Education & Training, Home Exercise Program  Safety Devices  Type of devices: All fall risk precautions in place, Call light within reach, Chair alarm in place, Patient at risk for falls, Left in chair, Nurse notified, Gait belt(recommend STEDY for OOB activity -discussed with nursing)  Restraints  Initially in place: No    AM-PAC Score  AM-PAC Inpatient Mobility Raw Score : 7 (02/02/20 0905)  AM-PAC Inpatient T-Scale Score : 26.42 (02/02/20 0905)  Mobility Inpatient CMS 0-100% Score: 92.36 (02/02/20 0905)  Mobility Inpatient CMS G-Code Modifier : CM (02/02/20 4157)          Goals  Short term goals  Time Frame for Short term goals:  To be met prior to discharge  Short term goal 1: Bed mobility with max A  Short term goal 2: Sit to/from stand with mod A  Short term goal 3: Bed to/from chair with mod A  Short term goal 4: Ambulate 15 feet with RW and min A  Patient Goals   Patient goals : Did not state       Therapy Time   Individual Concurrent Group Co-treatment   Time In 0808         Time Out 0846         Minutes 38         Timed Code Treatment Minutes: 23 Minutes       Nilesh Sport, PT    Nilesh Quintero PT, DPT [id number]

## 2020-02-02 NOTE — PROGRESS NOTES
Shift assessment completed. Pt. A&O x4. Neuro checks WNL. Dressing to LLE, has scant amount of drainage, serosanguinous in nature. Denies pain at this time. VS stable at this time. Oral meds held until speech eval can be performed tomorrow. Oral care provided w/ mouth swabs and chap stick applied to lips. Reviewed POC. No questions at this time. Call light and BST within reach. Fall precautions in place. Quintan[place] to gravity, emptied and charted. Quintanilla care provided w/ soap and water. No other needs at this time. Call light and BST within reach. Will continue to monitor. The care plan and education has been reviewed and mutually agreed upon with the patient.

## 2020-02-03 NOTE — CARE COORDINATION
Discharge Planning Assessment  Rn/SW discharge planner met w/patient/family member to discuss reason for admission, current living situation, and potential needs at the time of discharge. Demographics/Insurance verified Yes    Current type of dwelling: tri-level home    Living arrangements: home alone    Level of function/support: independent prior to admission    PCP: Omer    DME: cane and grab bars    Active with any community resources/agencies/skilled home care: [facility] in past    Medication compliance issues: no issues    Financial issues that could impact healthcare: no issues    Transportation at time of d/c (Discussed w/pt/family that on the day of discharge home, tentative time of discharge will be between 10am and noon): TBD    Discussed and provided facilities of choice if transition to a skilled nursing facility is required a the time of discharge-niece asked for SNF list to be left in room. Tentative discharge plan: SNF recommended by PT/OT. Called niece to discuss. She asked for SNF list to be left in pt's room for her to review when she arrives-done. Provided Sw ph # for her to contact w/selections.     Electronically signed by TRIXIE Martin  [phone]

## 2020-02-03 NOTE — PROGRESS NOTES
Occupational Therapy  Facility/Department: [address]  Daily Treatment Note  NAME: Shobha Barney  : 1932  MRN: [id number]    Date of Service: 2/3/2020    Discharge Recommendations:      Shobha Barney scored a 6/24 on the AM-PAC ADL Inpatient form. Current research shows that an AM-PAC score of 17 or less is typically not associated with a discharge to the patient's home setting. Based on the patients AM-PAC score and their current ADL deficits, it is recommended that the patient have 3-5 sessions per week of Occupational Therapy at d/c to increase the patients independence. OT Equipment Recommendations  Equipment Needed: No  Other: defer to next level of care     Assessment   Performance deficits / Impairments: Decreased functional mobility ; Decreased high-level IADLs;Decreased ADL status; Decreased endurance;Decreased cognition;Decreased balance;Decreased posture;Decreased safe awareness  Assessment: Pt presents with the above deficits impacting daily occupational performance and would benefit from continued skilled OT services. Treatment Diagnosis: Decreased mobility, ADL status, ADL transfers, cognition, high level IADL's, endurance, balance, posture, safety awareness associated with s/p ORIF L hip fracture. Prognosis: Fair  Decision Making: Medium Complexity  OT Education: OT Role;Plan of Care;Orientation;Transfer Training;Equipment;Precautions  REQUIRES OT FOLLOW UP: Yes  Activity Tolerance  Activity Tolerance: Treatment limited secondary to decreased cognition;Patient Tolerated treatment well  Safety Devices  Safety Devices in place: Yes  Type of devices: All fall risk precautions in place;Call light within reach; Chair alarm in place;Gait belt;Patient at risk for falls; Left in chair;Nurse notified         Patient Diagnosis(es): The primary encounter diagnosis was Traumatic rhabdomyolysis, initial encounter ([facility] [address]  Diagnoses of Fall from bed, initial encounter, Closed fracture of left No signs of pain at rest.   Vital Signs  Patient Currently in Pain: Denies   Orientation  Orientation  Overall Orientation Status: Impaired  Orientation Level: Disoriented to person;Disoriented to place; Disoriented to time;Disoriented to situation  Objective    ADL  Feeding: Scoop assist;Thickened liquids;Bringing food to mouth assist;Setup; Beverage management;Dependent/Total(SLP present for speech eval following tx session)  Grooming: Verbal cueing;Setup;Dependent/Total(HHA to wash face)  UE Bathing: Setup;Verbal cueing;Dependent/Total(HHA to initiate task)  UE Dressing: Dependent/Total(gown)  LE Dressing: Dependent/Total(threading B LEs into depends, total a to perform LB clothing management over hips once in stance)  Toileting: Dependent/Total(kenny cath in place, total a to perform anterior louise care in stance )  Additional Comments: UB/LB ADLs completed seated EOB. Balance  Sitting Balance: Dependent/Total(pt presents with strong posterior lean-progressing to periods of CGA )  Standing Balance: Dependent/Total  Standing Balance  Time: ~2 minutes  Activity: SPT from EOB to recliner chair, in stance to perform LB clothing management   Comment: mod a of 1 in stance to complete LB clothing management  Functional Mobility  Functional Mobility Comments: unsafe to attempt this date  Bed mobility  Supine to Sit: Dependent/Total;2 Person assistance  Scootin Person assistance;Dependent/Total  Transfers  Stand Pivot Transfers: Dependent/Total;2 Person assistance(mod a + min a of another)  Sit to stand: 2 Person assistance  Stand to sit: 2 Person assistance     Cognition  Overall Cognitive Status: Exceptions  Arousal/Alertness: Delayed responses to stimuli  Following Commands: Inconsistently follows commands  Attention Span: Unable to maintain attention; Difficulty dividing attention; Difficulty attending to directions  Memory: Decreased recall of precautions;Decreased short term memory  Safety Judgement: Decreased awareness of need for safety;Decreased awareness of need for assistance  Problem Solving: Decreased awareness of errors  Insights: Decreased awareness of deficits  Initiation: Requires cues for all  Sequencing: Requires cues for all  Cognition Comment: HHA to initiate ADL tasks      Perception  Overall Perceptual Status: Impaired  Unilateral Attention: Cues to maintain midline in sitting;Cues to maintain midline in standing  Initiation: Hand over hand to initiate tasks  Motor Planning: Hand over hand to sequence tasks        Plan   Plan  Times per week: 7x/week  Times per day: Daily  Specific instructions for Next Treatment: Cotx with PT to maximize safety and function  Current Treatment Recommendations: Pain Management, Positioning, Balance Training, Patient/Caregiver Education & Training, Self-Care / ADL, Functional Mobility Training, Neuromuscular Re-education, Equipment Evaluation, Education, & procurement, Endurance Training, Safety Education & Training    AM-PAC Score        AM-PAC Inpatient Daily Activity Raw Score: 6 (02/03/20 0942)  AM-PAC Inpatient ADL T-Scale Score : 17.07 (02/03/20 0942)  ADL Inpatient CMS 0-100% Score: 100 (02/03/20 0942)  ADL Inpatient CMS G-Code Modifier : CN (02/03/20 6048)    Goals  Short term goals  Time Frame for Short term goals: Discharge  Short term goal 1: Mod Assist bed mobility-dependent of 2 2/3  Short term goal 2: Mod Assist toileting-dependent 2/3  Short term goal 3: Mod Assist ADL transfers to/from bed, chair and toilet-dependent 2/3  Short term goal 4: Mod Assist UB/LB ADL's-dependent 2/3  Short term goal 5:  Max Assist functional mobility using RW or AAD for ADL's/functional activity-unsafe to attempt 2/3  Long term goals  Time Frame for Long term goals : LTG=STG       Therapy Time   Individual Concurrent Group Co-treatment   Time In       0857   Time Out       0926   Minutes       29         Timed Code Treatment Minutes:  29 minutes  Total Treatment Minutes: 29 minutes    [place], [address] [place] [address] Luis Antonio Durand OTR/L [id number], 2/4/2020, 1:14 PM

## 2020-02-03 NOTE — PROGRESS NOTES
Physical Therapy  Facility/Department: [address]  Daily Treatment Note  NAME: Pierce Elizabeth  : 1932  MRN: [id number]    Date of Service: 2/3/2020    Discharge Recommendations:  Pierce Elizabeth scored a 7/24 on the AM-PAC short mobility form. Current research shows that an AM-PAC score of 17 or less is typically not associated with a discharge to the patient's home setting. Based on the patients AM-PAC score and their current functional mobility deficits, it is recommended that the patient have 3-5 sessions per week of Physical Therapy at d/c to increase the patients independence. 3-5 sessions per week   PT Equipment Recommendations  Equipment Needed: No    Assessment   Body structures, Functions, Activity limitations: Decreased functional mobility ; Decreased strength;Decreased endurance;Decreased safe awareness;Decreased balance;Decreased cognition  Assessment: Pt remains limited by above deficits as well as cognition. Pt would benefit from continued skilled PT to improve her functional independence. Treatment Diagnosis: decreased functional mobility, impaired gait, decreased balance  Prognosis: Good  PT Education: Goals;PT Role;Plan of Care;Precautions;Weight-bearing Education  Patient Education: d/c recommendations. 2/3: transfers  Barriers to Learning: cognitive  REQUIRES PT FOLLOW UP: Yes  Activity Tolerance  Activity Tolerance: Patient limited by cognitive status     Patient Diagnosis(es): The primary encounter diagnosis was Traumatic rhabdomyolysis, initial encounter ([facility] [address] Diagnoses of Fall from bed, initial encounter, Closed fracture of left hip, initial encounter [facility]), and NSTEMI (non-ST elevated myocardial infarction) [facility]) were also pertinent to this visit.      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 has a past surgical history that includes Thyroidectomy, partial (10/04) and Hip fracture surgery (Left, 2020). Restrictions  Restrictions/Precautions  Restrictions/Precautions: General Precautions, Fall Risk, Weight Bearing(High fall risk per Sylvia Hartley)  Required Braces or Orthoses?: No  Lower Extremity Weight Bearing Restrictions  Left Lower Extremity Weight Bearing: Weight Bearing As Tolerated  Position Activity Restriction  Other position/activity restrictions: Activity--POD#1: 1)  Up to bedside chair at least daily as tolerated. 2)  Ambulate in room progressing to hallway with assistive device four times daily (including PT) when PT advises. 3)  Bathroom privileges with assistance. Subjective   General  Chart Reviewed: Yes  Response To Previous Treatment: Patient with no complaints from previous session. Family / Caregiver Present: No  Subjective  Subjective: No specific c/o pain. Speech largely unintelligible  General Comment  Comments: Pt in bed upon arrival  Pain Screening  Patient Currently in Pain: Denies  Vital Signs  Patient Currently in Pain: Denies       Orientation  Orientation  Overall Orientation Status: Impaired  Cognition      Objective   Bed mobility  Supine to Sit: 2 Person assistance(Dependent 2)  Scootin Person assistance  Transfers  Sit to Stand:  Moderate Assistance  Stand to sit: Moderate

## 2020-02-03 NOTE — PROGRESS NOTES
8:34 AM: Patient removed tele monitor and is pulling at all lines and tubes. She is refusing all tele monitoring. 10:58 AM: Morning assessment completed, patient denies needs at this time, call light in reach, will continue to monitor. The care plan and education has been reviewed and mutually agreed upon with the patient. Educated patient on the potential for falls during their hospital stay. Reviewed current fall safety protocol. Reviewed indication for use of bed alarm. Patient verbalized understanding. Epi Ambrosio NP aware of pamtient refusing lab draws, patient seems confused but is hard to understand d/t language barrier. 12:20 PM: PRN order for haldol received and given, patient now resting comfortably with no signs of distress. 3:21 PM: patient continues to rest without distress, repositioned for comfort and to allivate pressure from the bottom. Social work at bedside to help family choose precert. 6:03 PM: Patient resting in bed, call light within reach, fall precautions in place. Denies needs at this time.

## 2020-02-04 NOTE — PROGRESS NOTES
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 has a past surgical history that includes Thyroidectomy, partial (10/04) and Hip fracture surgery (Left, 2/1/2020). Restrictions  Restrictions/Precautions  Restrictions/Precautions: General Precautions, Fall Risk, Weight Bearing(High fall risk per Lucita Plain)  Required Braces or Orthoses?: No  Lower Extremity Weight Bearing Restrictions  Left Lower Extremity Weight Bearing: Weight Bearing As Tolerated  Position Activity Restriction  Other position/activity restrictions: Activity--POD#1: 1)  Up to bedside chair at least daily as tolerated. 2)  Ambulate in room progressing to hallway with assistive device four times daily (including PT) when PT advises. 3)  Bathroom privileges with assistance.   Subjective   General  Chart Reviewed: Yes  Response to previous treatment: Patient with no complaints from previous session  Family / Caregiver Present: No  Diagnosis: L Hip Fracture  Subjective  Subjective: pt in bed on arrival - agreeable to session - no signs of pain at rest - appears more fearful than painful during movement - reports some dizziness but BP WNL      Orientation  Orientation  Orientation Level: Oriented to person;Oriented to time;Disoriented to situation  Objective ADL  Grooming: Dependent/Total  UE Bathing: Dependent/Total  UE Dressing: Dependent/Total  Additional Comments: assist to swab out mouth, sponge bath UB/LB and change gown while seated EOB         Balance  Sitting Balance: Dependent/Total  Bed mobility  Supine to Sit: Dependent/Total;2 Person assistance  Scootin Person assistance;Dependent/Total  Comment: sat EOB x 10 minutes while changing clothing, gentle AROM for LEs performed in supine prior to getting up to decrease pain   Transfers  Stand Pivot Transfers: Maximum assistance(max A Of 1 person SPT from EOB to chair )        Difficult to understand but pleasant and agreeable. Fearful during mobility         Plan   Plan  Times per week: 7x/week  Times per day: Daily  Specific instructions for Next Treatment: Cotx with PT to maximize safety and function  Current Treatment Recommendations: Pain Management, Positioning, Balance Training, Patient/Caregiver Education & Training, Self-Care / ADL, Functional Mobility Training, Neuromuscular Re-education, Equipment Evaluation, Education, & procurement, Endurance Training, Safety Education & Training       OutComes Score       AM-PAC Score        AM-[facility] Inpatient Daily Activity Raw Score: 6 (20)  AM-PAC Inpatient ADL T-Scale Score : 17.07 (20)  ADL Inpatient CMS 0-100% Score: 100 (20)  ADL Inpatient CMS G-Code Modifier : CN (20)    Goals  Short term goals  Time Frame for Short term goals: Discharge  Short term goal 1: Mod Assist bed mobility-dependent of 2 2/4  Short term goal 2: Mod Assist toileting-dependent 2/4  Short term goal 3: Mod Assist ADL transfers to/from bed, chair and toilet-max A SPT 2/4  Short term goal 4: Mod Assist UB/LB ADL's-dependent 2/3  Short term goal 5:  Max Assist functional mobility using RW or AAD for ADL's/functional activity-unsafe to attempt 2/3  Long term goals  Time Frame for Long term goals : LTG=STG       Therapy Time   Individual Concurrent Group Co-treatment   Time In       6090   Time Out       1425   Minutes       30   Timed Code Treatment Minutes: 30 Minutes   Total minutes 30    Luis Antonio Daniel Sloan, OT   Luis Antonio Hargrove OTR/L [id number], 2/4/2020, 2:37 PM

## 2020-02-04 NOTE — PROGRESS NOTES
are low,  Start IV  venofer , hold off on transfusion unless < 7   4. Delirium:  Appears to be slowly improving. Prn haldol is available   5. Parkinson's with recent dysphagia:  Speech is following, currently NPO . Hopefully her swallowing will improve in the next 24 hours. If not then we will need to consider NG feeds vs ? Peg   6. Atrial fib/ flutter:  Rate is currently controlled:  AC with eliquis , also on low dose lovenox   7. HTN:  BP is in range  8. Hypothyroidism: on levothyroxine.   Recent TSH in range ( 2.2)          Diet: Diet NPO Effective Now  Code:Full Code  DVT PPX      Keila Roman, APRN - CNP   2/4/2020 7:50 AM

## 2020-02-04 NOTE — PROGRESS NOTES
to sitting EOB to help improve circulation and decrease pain   Transfers  Sit to Stand: Maximum Assistance(1 person assist)  Bed to Chair: Maximum assistance(1 person assist)  Stand Pivot Transfers: Maximum Assistance(1 person assist)  Ambulation  Ambulation?: No(stand pivot to chair)  Comment: Co-treat with OT to maximize function and for safety. Pt able to perform AROM in supine prior to sitting edge of bed. She required verbal and tactile cues, but was agreeable. Transferred pt to EOB. Changed soiled gown in seated. She required Min A to CGA while sitting EOB due to anterior lean. Her trunk stability improved with increased sitting time. Stand-pivot to recliner with 1 person assist. Pt seated comfortably in recliner at end of the sesion. AM-PAC Score  AM-PAC Inpatient Mobility without Stair Climbing Raw Score : 8 (02/04/20 1452)  AM-PAC Inpatient without Stair Climbing T-Scale Score : 30.65 (02/04/20 1452)  Mobility Inpatient CMS 0-100% Score: 80.91 (02/04/20 1452)  Mobility Inpatient without Stair CMS G-Code Modifier : CM (02/04/20 1452)    Goals  Short term goals  Time Frame for Short term goals: To be met prior to discharge  Short term goal 1: Bed mobility with max A  Short term goal 2: Sit to/from stand with mod A: goal met 2/3  Short term goal 3: Bed to/from chair with mod A  Short term goal 4: Ambulate 15 feet with RW and min A  Patient Goals   Patient goals : Did not state    Plan    Plan  Times per week: 7  Times per day: Daily  Current Treatment Recommendations: Strengthening, Balance Training, Transfer Training, Endurance Training, Gait Training, Safety Education & Training, Home Exercise Program, ROM, Functional Mobility Training  Safety Devices  Type of devices:  All fall risk precautions in place, Left in chair, Call light within reach, Chair alarm in place, Nurse notified  Restraints  Initially in place: No     Therapy Time   Individual Concurrent Group Co-treatment   Time In       1355   Time

## 2020-02-04 NOTE — CARE COORDINATION
Barrier to d/c: pt NPO d/t failed swallow eval, pt confused. MKV and [place] following-pt would need to be off Haldol and have improved mentation for [place] or MKV. The Plan for Transition of Care is related to the following treatment goals: improve functional status    The Patient and/or patient representative was provided with a choice of provider and agrees   with the discharge plan. [x] Yes [] No    Freedom of choice list was provided with basic dialogue that supports the patient's individualized plan of care/goals, treatment preferences and shares the quality data associated with the providers.  [x] Yes [] No    Electronically signed by TRIXIE Spencer on 2/4/2020 at 10:50 AM

## 2020-02-04 NOTE — CARE COORDINATION
Met w/wellington yesterday to discuss SNF choices. Nikarissa stated that she would like a referral to [facility] and [facility]. Referrals faxed. [facility] can likely accept if pt's mentation improves. ROLANDA reviewing.   Electronically signed by TRIXIE Benson on 2/4/2020 at 9:09 AM

## 2020-02-05 NOTE — PROGRESS NOTES
Occupational Therapy  Facility/Department: [address]  Daily Treatment Note  NAME: Minnie Orta  : 1932  MRN: [id number]    Date of Service: 2020    Discharge Recommendations: Minnie Orta scored a 6/24 on the AM-PAC ADL Inpatient form. Current research shows that an AM-PAC score of 17 or less is typically not associated with a discharge to the patient's home setting. Based on the patients AM-PAC score and their current ADL deficits, it is recommended that the patient have 3-5 sessions per week of Occupational Therapy at d/c to increase the patients independence. OT Equipment Recommendations  Equipment Needed: No  Other: defer to next level of care     Assessment   Performance deficits / Impairments: Decreased functional mobility ; Decreased high-level IADLs;Decreased ADL status; Decreased endurance;Decreased cognition;Decreased balance;Decreased posture;Decreased safe awareness  Assessment: Pt presents with the above deficits impacting daily occupational performance and would benefit from continued skilled OT services. Treatment Diagnosis: Decreased mobility, ADL status, ADL transfers, cognition, high level IADL's, endurance, balance, posture, safety awareness associated with s/p ORIF L hip fracture. Prognosis: Fair  OT Education: Plan of Care;OT Role;Transfer Training  REQUIRES OT FOLLOW UP: Yes  Activity Tolerance  Activity Tolerance: Patient limited by fatigue  Activity Tolerance:  Pts O2 varried 80s-90s throughout (RN reported that has been throughout day) -BP noted to be at 91/60 while EOB- RN notified and PT assisted back to supine  Safety Devices  Safety Devices in place: Yes  Type of devices: Left in bed;Bed alarm in place;Call light within reach;Nurse notified; All fall risk precautions in place(RN and daughter in room at end of session)         Patient Diagnosis(es): The primary encounter diagnosis was Traumatic rhabdomyolysis, initial encounter [facility]).  Diagnoses of Fall Mod Assist ADL transfers to/from bed, chair and toilet--max A SPT 2/4, unable to assess 2/5  Short term goal 4: Mod Assist UB/LB ADL's--dependent 2/3, not addressed 2/5  Short term goal 5:  Max Assist functional mobility using RW or AAD for ADL's/functional activity--unsafe to attempt 2/5  Long term goals  Time Frame for Long term goals : LTG=STG       Therapy Time   Individual Concurrent Group Co-treatment   Time In       1120   Time Out       1150   Minutes       30     Timed Code Treatment Minutes:  30 Minutes    Total Treatment Minutes:  DARREN MalagonR/L #[id number]

## 2020-02-05 NOTE — PROGRESS NOTES
Speech Language Pathology  Dysphagia Treatment Note    Name:  Gene Wan  :   1932  Medical Diagnosis:  Closed left hip fracture, initial encounter ([facility] [address] [S72.002A]  Closed left hip fracture, initial encounter ([facility] [address] [S72.002A]  Treatment Diagnosis: Oropharyngeal Dysphagia  Pain level:  Pt denied    Subjective:   Pt's niece present for session. Current Diet Level:   Strict NPO    Tolerance of Current Diet Level:   Per CNP, trying to determine oral tolerance vs need for alternative nutrition. Assessment of Texture Tolerance:  Pt sitting up more response with her niece present. Accepting of pudding and mildly thick (nectar) consistency. Pt demonstrated overt signs of premature bolus loss, poor pharyngeal clearing, and penetration/aspiration. Pt utilized head positioning to aid in posterior bolus propulsion which also reduced airway protection. Elicited 3 swallows for single tsp sip of mildly thick (nectar) liquid and resulted in audible swallow and congested coughing. Pudding resulted in moderate signs of pharyngeal pooling. Pt elicited 3-4 swallows per presentation and had intermittent throat clearing with secondary clearing swallows. Although pt remains unsafe for liquids at this time, recommend MBS to further assess ability to thicker textures to aid in medication administration and determine appropriate therapeutic methods. Diet and Treatment Recommendations:  Recommend MBS to be completed prior to diet recommendations    STG: (NEW Goals made due to downgrade to NPO status)   1) Pt will tolerate ongoing swallow assessment to determine safest and least restrictive diet. 2020 ONGOING   2) Pt will tolerate MBS to further assess the pharyngeal phase of the swallow. 2020 ONGOING - provided education re MBS to be completed later this date   3) Pt will tolerate ice chips/ small tsp of water with no overt s/s of aspiration with SLP only at this time.     2020 ONGOING     Plan:

## 2020-02-05 NOTE — PROGRESS NOTES
[facility]    2/5/2020 1:25 PM        Name: Arabella Jansen Admitted: 1/31/2020  Primary Care Provider: Rolley Duverney, MD (Tel: [phone])      Subjective: Savi Salas Pt seen this am with RN and speech at bedside  At present time her niece is at the bedside and we had detailed conversation.   Niece is able to speak English to the patient and communicate     When asked about pain,  The patient just repeated states she wants to eat     Reviewed interval ancillary notes    Current Medications  carbidopa-levodopa (SINEMET)  MG per tablet 1 tablet, BID  potassium phosphate 20 mmol in dextrose 5 % 250 mL IVPB, Once  dextrose 5 % solution, Continuous  enoxaparin (LOVENOX) injection 30 mg, Daily  pantoprazole (PROTONIX) injection 40 mg, Daily  acetaminophen (TYLENOL) suppository 650 mg, Q4H PRN  haloperidol lactate (HALDOL) injection 2 mg, Q6H PRN  phenol 1.4 % mouth spray 1 spray, Q2H PRN  [Held by provider] apixaban (ELIQUIS) tablet 2.5 mg, BID  sodium chloride flush 0.9 % injection 10 mL, 2 times per day  sodium chloride flush 0.9 % injection 10 mL, PRN  docusate sodium (COLACE) capsule 100 mg, BID  sennosides-docusate sodium (SENOKOT-S) 8.6-50 MG tablet 1 tablet, BID  magnesium hydroxide (MILK OF MAGNESIA) 400 MG/5ML suspension 30 mL, Daily PRN  ondansetron (ZOFRAN) injection 4 mg, Q6H PRN  HYDROcodone-acetaminophen (NORCO) 5-325 MG per tablet 1 tablet, Q4H PRN    Or  HYDROcodone-acetaminophen (NORCO) 5-325 MG per tablet 2 tablet, Q4H PRN  morphine (PF) injection 2 mg, Q3H PRN  magnesium hydroxide (MILK OF MAGNESIA) 400 MG/5ML suspension 30 mL, Daily PRN  acetaminophen (TYLENOL) tablet 650 mg, Q4H PRN  sertraline (ZOLOFT) tablet 50 mg, Daily  levothyroxine (SYNTHROID) tablet 100 mcg, Daily        Objective:  BP 94/60   Pulse 75   Temp 97.5 °F (36.4 °C) (Temporal)   Resp 18   Ht 5' 4\" (1.626 m) able to take her eliquis   2. Rhabdomyolysis:   pt was found down at home,   CK's are trending down with supportive care   3. Acute blood loss anemia: To be expected,  iron stores are low,  Start IV  venofer , hold off on transfusion unless < 7 Hgb is improved to 7.9 today   4. Hypernatremia:  Change IV fluids to D5W  5. Low K and low phos due to not eating: will replace   6. Delirium:  Appears to be slowly improving   7. Parkinson's with recent dysphagia:  Speech is following,  She did better today. MBS was ordered. 8. Atrial fib/ flutter:  Rate is currently controlled:  AC with eliquis   9. HTN:  BP is in range  10. Hypothyroidism: on levothyroxine. Recent TSH in range ( 2.2)    I had detailed conversation with the niece at bedside. She does not think patient would want a PEG tube, however will discuss this with her cousin who is also involved in her care.             Diet: Diet NPO Effective Now  Code:Full Code  DVT PPX      FARHANA Kong - CNP   2/5/2020 1:25 PM

## 2020-02-05 NOTE — PROGRESS NOTES
Speech Language Pathology  Dysphagia Treatment Note    Name:  Stephanie Joseph  :   1932  Medical Diagnosis:  Closed left hip fracture, initial encounter ([facility] [address] [S72.002A]  Closed left hip fracture, initial encounter ([facility] [address] [S72.002A]  Treatment Diagnosis: Oropharyngeal Dysphagia  Pain level:  Pt denied    Subjective:   Per RN oral care and use of moist toothettes has resulted in increased congestion and coughing. Pt has not received Parkinson's medication since 20 due to being NPO. RN discussed with pharmacy to request medication that can be crushed in puree, RN not comfortable giving puree due to the severity of dysphagia. Medications crushed by RN and facilitated by SLP. Will return to further assess swallow function. Current Diet Level:   Strict NPO    Tolerance of Current Diet Level:   Pt requesting water and liquids from nurse. RN reported single ice chip provided for moisture this AM, resulted in congested coughing. Assessment of Texture Tolerance:  Pt sitting up in bed, tearful and requesting water. Accepting of meds crushed in applesauce. Pt demonstrated moderate to severely reduced laryngeal excursion. Required 3-4 swallows to clear suspected pharyngeal residue. No overt coughing or throat clearing during session but high concern for pharyngeal residue of purees with high risk for silent aspiration. Will return later this date for further assessment of liquids. Diet and Treatment Recommendations:  Recommend Strict NPO status with ongoing swallow assessment     STG: (NEW Goals made due to downgrade to NPO status)   1) Pt will tolerate ongoing swallow assessment to determine safest and least restrictive diet. 2020 ONGOING   2) Pt will tolerate MBS to further assess the pharyngeal phase of the swallow.    2020 ONGOING - MBS will eventually be beneficial to assess the pharyngeal phase and determine appropriate therapeutic methods, however unable to demonstrate tolerance of any po intake at bedside. 3) Pt will tolerate ice chips/ small tsp of water with no overt s/s of aspiration with SLP only at this time. 2/5/2020 ONGOING     Plan:   Continued daily Dysphagia treatment, 3-5 times per week, with goals per plan of care. Patient/Family Education:  Education given to the Pt and nurse, who verbalized understanding. Pt with improving comprehension of information but appeared limited. Became more agitated with recommendations towards end of session, requesting water. Discharge Recommendations:    Pt will benefit from continued skilled Speech Therapy for Dysphagia services, prior to and following d/c. Timed Code Treatment:   0 minutes     Total Treatment Time:   10 minutes     Signature:   Alfred Montiel M.A.  CCC-SLP S.P. [id number]  Speech-Language Pathologist [phone]  2/5/2020 9:00 AM

## 2020-02-05 NOTE — PROGRESS NOTES
Incentive Spirometry education and demonstration completed by Respiratory Therapy Yes      Response to education: Fair      Teaching Time: 5 minutes    Minimum Predicted Vital Capacity - 547 mL. Patient's Actual Vital Capacity - 400 mL.  Turning over to Nursing for routine follow-up No.    Comments: pt unable to meet goal     Electronically signed by Sonido Wilkinson RCP on 2/5/2020 at 11:27 AM

## 2020-02-05 NOTE — DISCHARGE INSTR - COC
Continuity of Care Form    Patient Name: Paullette Bloch   :  1932  MRN:  [id number]    Admit date:  2020  Discharge date:  ***    Code Status Order: Full Code   Advance Directives:   Advance Care Flowsheet Documentation     Date/Time Healthcare Directive Type of Healthcare Directive Copy in [address] Agent's Name Healthcare Agent's Phone Number    20 1706  Yes, patient has an advance directive for healthcare treatment  Durable power of  for health care;Living will  No, copy requested from family  --  [place]  [phone]    20 0437  --  --  --  Next of kin  Pamela Diana  [phone]    20 2300  Yes, patient has an advance directive for healthcare treatment  Durable power of  for health care;Living will;Health care treatment directive  No, copy requested from family  --  --  --          Admitting Physician:  Amari Zhong DO  PCP: Temitope Mack MD    Discharging Nurse: [facility] Unit/Room#: P2X-8287/8555-09  Discharging Unit Phone Number: ***    Emergency Contact:   Extended Emergency Contact Information  Primary Emergency Contact: [address] [place] [address] Phone: [phone]  Mobile Phone: [phone]  Relation: Niece/Nephew  Secondary Emergency Contact: Abby Castro [address] Phone: [phone]  Relation: Other    Past Surgical History:  Past Surgical History:   Procedure Laterality Date    HIP FRACTURE SURGERY Left 2020    HIP OPEN REDUCTION INTERNAL FIXATION  LEFT GAMMA performed by Mayte Patiño MD at [address] [place] [address], PARTIAL  10/04    [facility] BENIGN       Immunization History:   Immunization History   Administered Date(s) Administered    Influenza Virus Vaccine 2015    Influenza Whole 2010    Influenza, High Dose (Fluzone 65 yrs and older) 2011, 2012, 2013, 2014, 10/05/2016, 2017, personal belongings (please select all that are sent with patient):  {CHP DME Belongings:317182024}    RN SIGNATURE:  {Esignature:[id number]}    CASE MANAGEMENT/SOCIAL WORK SECTION    Inpatient Status Date: ***    Readmission Risk Assessment Score:  Readmission Risk              Risk of Unplanned Readmission:        15           Discharging to Facility/ Agency   · Name:   · Address:  · Phone:  · Fax:    Dialysis Facility (if applicable)   · Name:  · Address:  · Dialysis Schedule:  · Phone:  · Fax:    / signature: {Esignature:[id number]}    PHYSICIAN SECTION    Prognosis: {Prognosis:6755865536}    Condition at Discharge: [address] [place] [address] Patient Condition:775879335}    Rehab Potential (if transferring to Rehab): {Prognosis:5498606709}    Recommended Labs or Other Treatments After Discharge: ***    Physician Certification: I certify the above information and transfer of Jeni Mead  is necessary for the continuing treatment of the diagnosis listed and that she requires {Admit to Appropriate Level of Care:31579} for {GREATER/LESS:495314278} 30 days.      Update Admission H&P: {CHP DME Changes in PORUP:174339855}    PHYSICIAN SIGNATURE:  {Esignature:[id number]}

## 2020-02-05 NOTE — PROGRESS NOTES
Shift assessment complete am sinemet given crushed with speech therapy, new IV placed pt tolerated well, middle dressing to left hip old drainage other two sites CDI, kenny draining to gravity, family at bedside Clayburn Nekoma also at bedside. Care plan and education has been mutually agreed upon with family and pt.  Therapy in room, modified tentatively scheduled for 1400

## 2020-02-05 NOTE — PROGRESS NOTES
[address] [facility] Orthopedic Surgery   Progress Note    CHIEF COMPLAINT/DIAGNOSIS:  2/1/20 LEFT hip IM nail    SUBJECTIVE: Patient sitting up in bed; appears confused. Dysarthric. She has ripped off her dressings with slight bloody drainage from medical incision. Incision are well approximated with staples without erythema. Re-dressed wound with RN with gauze, Tegaderm and applied Ace wrap in attempt to prevent patient from pulling at dressings. .    OBJECTIVE  Physical    VITALS:  BP 94/60   Pulse 75   Temp 97.5 °F (36.4 °C) (Temporal)   Resp 18   Ht 5' 4\" (1.626 m)   Wt 109 lb (49.4 kg)   LMP 09/23/1983   SpO2 93%   BMI 18.71 kg/m²     GENERAL: Alert, NAD   MUSCULOSKELETAL: LEFT LE  INCISION:  Redressed all surgical incisions as noted above  ROM: unable to adequately assess  Sensory:  Intact to light touch  Vascular:   Intact post tib pulse;  calf soft and appears nontender to palpation    Data    ALL MEDICATIONS HAVE BEEN REVIEWED    CBC:   Recent Labs     02/04/20  0443 02/05/20  1009   WBC 6.7 8.5   HGB 7.3* 7.9*   HCT 22.8* 24.6*    200     BMP:   Recent Labs     02/04/20  0443 02/05/20  1009   * 152*   K 3.7 3.3*   * 119*   CO2 22 22   PHOS  --  0.5*   BUN 21* 16   CREATININE 0.6 0.6     INR:   No results for input(s): INR in the last 72 hours. ASSESSMENT:  2/1/20 LEFT hip IM nail, POD#4  Parkinson's   Atrial fibrillation on home Eliquis  CKD   Osteoporosis  Traumatic rhabdomyolysis  Dysphagia    PLAN:   - WB status:  WBAT  - DVT prophylaxis: Normally Eliquis; she is currently NPO 2/2 dysphagia; changed to Lovenox  - PT/OT  - D/C Plan: SNF when medically stable  - Pain Control: current regimen  Due to orthopaedic surgical procedure/condition, patient may require pain medication for up to 6-8 weeks. - FEN:  Dysphagia; currently NPO. MBS tomorrow. .  - F U with Dr. Lindsay Reza in 2 weeks; call [phone] for appt.      FARHANA LAND-CNP  2/5/2020  2:44 PM    .

## 2020-02-05 NOTE — PROGRESS NOTES
Physical Therapy  Facility/Department: [address]  Daily Treatment Note  NAME: Chana Kawasaki  : 1932  MRN: [id number]    Date of Service: 2020    Discharge Syd Tam scored a / on the AM-PAC short mobility form. Current research shows that an AM-PAC score of 17 or less is typically not associated with a discharge to the patient's home setting. Based on the patients AM-PAC score and their current functional mobility deficits, it is recommended that the patient have 3-5 sessions per week of Physical Therapy at d/c to increase the patients independence. 3-5 sessions per week   PT Equipment Recommendations  Equipment Needed: No    Assessment   Body structures, Functions, Activity limitations: Decreased functional mobility ; Decreased strength;Decreased cognition;Decreased endurance;Decreased safe awareness;Decreased balance;Decreased ROM  Assessment: Pt is limited by the above deficits and would benefit from skilled PT services to maximize functional mobility and safety. Treatment Diagnosis: decreased functional mobility, impaired gait, decreased balance  Prognosis: Good;Fair  PT Education: Goals;PT Role;Plan of Care;Transfer Training  Patient Education: d/c recommendations. : improtance of mobility   Barriers to Learning: cognition   REQUIRES PT FOLLOW UP: Yes  Activity Tolerance  Activity Tolerance: Patient limited by fatigue;Patient limited by cognitive status  Activity Tolerance: limited today by fatigue; RN stated that pt was given Haldol last night due to pulling out IV, pt not sleeping last night     Patient Diagnosis(es): The primary encounter diagnosis was Traumatic rhabdomyolysis, initial encounter ([facility] [address] Diagnoses of Fall from bed, initial encounter, Closed fracture of left hip, initial encounter [facility]), and NSTEMI (non-ST elevated myocardial infarction) [facility]) were also pertinent to this visit.      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 has a past surgical history that includes Thyroidectomy, partial (10/04) and Hip fracture surgery (Left, 2/1/2020). Restrictions  Restrictions/Precautions  Restrictions/Precautions: Fall Risk, General Precautions, Weight Bearing  Required Braces or Orthoses?: No  Lower Extremity Weight Bearing Restrictions  Left Lower Extremity Weight Bearing: Weight Bearing As Tolerated  Position Activity Restriction  Other position/activity restrictions: Activity--POD#1: 1)  Up to bedside chair at least daily as tolerated. 2)  Ambulate in room progressing to hallway with assistive device four times daily (including PT) when PT advises. 3)  Bathroom privileges with assistance. Subjective   General  Chart Reviewed: Yes  Response To Previous Treatment: Patient with no complaints from previous session. Family / Caregiver Present: Yes(daughter)  Subjective  Subjective: Pt reports having only a little pain at rest; pt speaks [place], and daughter is here today to interpret.  Pt can speak and understand some English  General Comment  Comments: Pt in bed upon arrival          Orientation  Orientation  Overall Orientation Status: Impaired  Orientation Level: Oriented to person(pt knew she was in a

## 2020-02-06 NOTE — PROGRESS NOTES
Patients niece at the bedside, updated on plan of care, [address] notified on plan to postpone modified barium swallow test until the patient can be taken off the mask. Patients SPO2 93% at this time. BP down to 84/55, will continue to monitor.

## 2020-02-06 NOTE — PROGRESS NOTES
Patients niece at the bedside states that the patients nephew will be here soon with Power of [employer] paperwork. This RN educated the niece that the patient is not of able mind to sign off her rights to anyone. Spiritual care consulted as well.

## 2020-02-06 NOTE — PROGRESS NOTES
Pt seen and examined again in the afternoon with niece and nephew at the bedside. Pt was alert and able to correctly identify her next of kin and give appropriate  etc.  POA papers were signed and notarized in my presence. We also discussed the plan of care. Pt will now be a DNR. No CPR, No ACLS meds, No defib, No intubation. Will continue with IV hydration and IV antibiotics. Currently has limited IV access with IV in the right foot. If this IV infiltrates we will not place another IV. I will add comfort meds. We discussed the possibility of transfer to hospice facility in the next 24 hours if she continues to deteriorate.

## 2020-02-06 NOTE — PROGRESS NOTES
Patient resting in bed with family at the bedside, spo2 90% on mask. , bedside chest x-ray completed, kenny with minimal output. Fall precautions in place, hourly rounding, call light and belongings in reach, bed in lowest position, wheels locked in place, side rails up x 2, walkways free of clutter, bed alarm on, video monitor in use, patinet in room near the nurses station, family at the bedside.

## 2020-02-06 NOTE — PROGRESS NOTES
Shift assessment completed. Eliquis and Sinemet crushed and given with applesauce. Mouth care provided. Patient confused at times but cooperative. The care plan and education has been reviewed and the family was updated.

## 2020-02-06 NOTE — PROGRESS NOTES
[facility]    2/6/2020 1:04 PM        Name: Jeremy Jansen Admitted: 1/31/2020  Primary Care Provider: Aki Longo MD (Tel: [phone])      Subjective: Augusto Tran Pt seen urgently this am at request of Rn for increased chest congestion with hypoxia. Respiratory was at the bedside and the patient was NT suctioned for very large amounts of thick white to tan secretions. Breathing and saturations improved following suctioning.      Reviewed interval ancillary notes    Current Medications  levofloxacin (LEVAQUIN) 500 MG/100ML infusion 500 mg, Q24H  carbidopa-levodopa (SINEMET)  MG per tablet 1 tablet, BID  dextrose 5 % solution, Continuous  iron sucrose (VENOFER) 200 mg in sodium chloride 0.9 % 100 mL IVPB, Q24H  ketorolac (TORADOL) injection 15 mg, Q6H PRN  pantoprazole (PROTONIX) injection 40 mg, Daily  acetaminophen (TYLENOL) suppository 650 mg, Q4H PRN  haloperidol lactate (HALDOL) injection 2 mg, Q6H PRN  phenol 1.4 % mouth spray 1 spray, Q2H PRN  apixaban (ELIQUIS) tablet 2.5 mg, BID  sodium chloride flush 0.9 % injection 10 mL, 2 times per day  sodium chloride flush 0.9 % injection 10 mL, PRN  docusate sodium (COLACE) capsule 100 mg, BID  sennosides-docusate sodium (SENOKOT-S) 8.6-50 MG tablet 1 tablet, BID  magnesium hydroxide (MILK OF MAGNESIA) 400 MG/5ML suspension 30 mL, Daily PRN  ondansetron (ZOFRAN) injection 4 mg, Q6H PRN  HYDROcodone-acetaminophen (NORCO) 5-325 MG per tablet 1 tablet, Q4H PRN    Or  HYDROcodone-acetaminophen (NORCO) 5-325 MG per tablet 2 tablet, Q4H PRN  morphine (PF) injection 2 mg, Q3H PRN  acetaminophen (TYLENOL) tablet 650 mg, Q4H PRN  sertraline (ZOLOFT) tablet 50 mg, Daily  levothyroxine (SYNTHROID) tablet 100 mcg, Daily        Objective:  BP (!) 99/59   Pulse 86   Temp 98.6 °F (37 °C) (Axillary)   Resp 18   Ht 5' 4\" (1.626 m)   Wt 109 lb (49.4 kg)   LMP 09/23/1983   SpO2 92%   BMI 18.71 kg/m²     Intake/Output Summary (Last 24 hours) at 2/6/2020 1304  Last data filed at 2/6/2020 0541  Gross per 24 hour   Intake 2447 ml   Output 550 ml   Net 1897 ml      Wt Readings from Last 3 Encounters:   02/04/20 109 lb (49.4 kg)   01/23/20 105 lb (47.6 kg)   12/05/19 105 lb (47.6 kg)       General appearance:  Appears sickly and frail   Eyes: Sclera clear. Pupils equal.  ENT: Moist oral mucosa. Trachea midline, no adenopathy. Cardiovascular: Regular rhythm, normal S1, S2. No murmur. No edema in lower extremities  Respiratory: Not using accessory muscles. Scattered moist ronchi noted that clear after aggressive NT suctioning for thick mucous . GI: Abdomen soft, no tenderness, not distended, normal bowel sounds  Musculoskeletal: No cyanosis in digits, neck supple  Neurology:pt is cooperative. Moves all ext. Psych: appears to be alert ,  She speaks [place] so difficult to assess at present with no family at bedside   Skin: Warm, dry, poor turgor. Left hip dressing intact with serous drainage noted. Surrounding edema is present     Labs and Tests:  CBC:   Recent Labs     02/04/20  0443 02/05/20  1009   WBC 6.7 8.5   HGB 7.3* 7.9*    200     BMP:    Recent Labs     02/04/20  0443 02/05/20  1009   * 152*   K 3.7 3.3*   * 119*   CO2 22 22   BUN 21* 16   CREATININE 0.6 0.6   GLUCOSE 100* 115*     Interval development of multifocal airspace disease concerning for pneumonia  with a probable small left-sided pleural effusion      Problem List  Principal Problem:    Closed left hip fracture, initial encounter [facility])  Active Problems:    Atrial fibrillation ([facility] [address]    Hypothyroidism    Depression    Parkinson's disease ([facility] [address]  Resolved Problems:    * No resolved hospital problems. *       Assessment & Plan:   1. Probable aspiration PNA:  Chest xray reviewed,  Will start IV levaquin and continue with prn suctioning.   2. POD # 5 from Left him IM nail:

## 2020-02-06 NOTE — PLAN OF CARE
Problem: Falls - Risk of:  Goal: Will remain free from falls  Description  Will remain free from falls  Outcome: Ongoing   Fall precautions in place, hourly rounding, call light and belongings in reach, bed in lowest position, wheels locked in place, side rails up x 2, walkways free of clutter,bed alarm on ,video monitor in use, family at [facility], patient in room near nurses station. Problem: Pain:  Goal: Pain level will decrease  Description  Pain level will decrease  Outcome: Ongoing   Using advanced dementia pain scale, patient is unable to rate pain herself. No signs/ symptoms of pain at this time. Problem: Breathing Pattern - Ineffective:  Goal: Ability to achieve and maintain a regular respiratory rate will improve  Description  Ability to achieve and maintain a regular respiratory rate will improve  Outcome: Ongoing   Patient unable to clear secretions, suctioning initiated, oxygen saturation dropped this morning, patient placed on entrainment mask, oral care being preformed every 4 hours. Osygen saturation imporved after these interventions. Problem: Skin Integrity:  Goal: Will show no infection signs and symptoms  Description  Will show no infection signs and symptoms  Outcome: Ongoing     Skin remains dry and intact, no signs of breakdown noted. Patient encouraged to reposition every 2 hours and is assisted to do so when unable to reposition independently. Specialty low air loss mattress in use.

## 2020-02-07 NOTE — FLOWSHEET NOTE
02/07/20 3130   Provider Notification   Reason for Communication Evaluate   Provider Name [id number]   Provider Notification Physician   Method of Communication Secure Message   Response Waiting for response   Notification Time 4204   The  keeps calling to find out how if the death certificate will be signed as resp failure in part 1 & hip fx in part 2. The  does not think it is a  case from what was read on the chart & Pt hx.

## 2020-02-07 NOTE — PROGRESS NOTES
Occupational Therapy  Daniel Moon  Occupational Therapy Discharge Summary    Name: Daniel Moon  : 1932    The pt was evaluated by OT on 20 and seen for 3 treatment sessions prior to DC, pt   20. The pt's acute therapy goals were:  Short term goals  Time Frame for Short term goals: Discharge  Short term goal 1: Mod Assist bed mobility--dependent of 2 2/5  Short term goal 2: Mod Assist toileting--dependent , not addressed 2/5  Short term goal 3: Mod Assist ADL transfers to/from bed, chair and toilet--max A SPT , unable to assess 2/5  Short term goal 4: Mod Assist UB/LB ADL's--dependent 2/3, not addressed 2/5  Short term goal 5: Max Assist functional mobility using RW or AAD for ADL's/functional activity--unsafe to attempt 2/5  Long term goals  Time Frame for Long term goals : LTG=STG     Patient met 0 goals during stay. Number of Refusals:0  Number of Holds: 0  During this hospitalization, the patient was educated on:       DC pt from OT caseload at this time. Thank you!     Tayler Wallace, OTR/KEDAR [id number]

## 2020-02-07 NOTE — PROGRESS NOTES
Patient's family member Katia Erickson (Niece) and Cyndeejoseph Hernandez ([facility]) were notified that the patient passed away. Cardiac death hotline [phone] was called. 's office was called and related information was provided. [facility] ([facility]) at [address], [place], [place] [address] was called [phone].

## 2020-02-07 NOTE — PROGRESS NOTES
Physical Therapy Discharge Summary    Name: Ladi Aldridge  : 1932    The pt was evaluated by PT on 2020 and seen for 3 treatment sessions. Patient  2020. The pt's acute therapy goals were:  Short term goals  Time Frame for Short term goals: To be met prior to discharge  Short term goal 1: Bed mobility with max A  Short term goal 2: Sit to/from stand with mod A: goal met 2/3  Short term goal 3: Bed to/from chair with mod A  Short term goal 4: Ambulate 15 feet with RW and min A       Patient met 1 goals during stay. Number of Refusals:0  Number of Holds: 1  During this hospitalization, the patient was educated on:  Patient Education: d/c recommendations. : improtance of mobility     DC pt from PT caseload at this time. Thank you!     [address] [place], DPT [id number]

## 2020-02-07 NOTE — PROGRESS NOTES
Speech/Language Pathology  Discharge Note    Name: Davis Tatum  : 1932    Speech Therapy/Dysphagia  Pt  on 20. Bedside Swallow Eval completed 20 (see report). No goals met. DYSPHAGIA GOALS:  1.) The patient will tolerate recommended diet without observed clinical signs of aspiration; (GOAL NOT MET)  2.) The patient will improve oral preparation phase via bolus control/manipulation exercises to 5/5 each trial.; (GOAL NOT MET)  3.) The patient/caregiver will demonstrate understanding of compensatory strategies for improved swallowing safety.  (GOAL NOT MET)    Roxie Moon M.A., [facility]  Speech-Language Pathologist

## 2020-02-07 NOTE — FLOWSHEET NOTE
02/07/20 5468   Provider Notification   Reason for Communication Evaluate   Provider Name [facility]    Provider Notification Nurse Practitioner   Method of Communication Secure Message   Response Waiting for response   Notification Time 6761   Can yo call about the death certificate please  2404: [facility] called back and is discussing case with MD's and Sr Marionamando Mitchell will sign the death certificate. Called Jazlyn at the 's office to let him know the cause of death as Aspiration PNA / respiratory failure after a hip fracture.

## 2020-02-07 NOTE — DISCHARGE SUMMARY
[facility]    Patient Demographics    Patient. Josh Parish  Date of Birth. 11/9/1932  MRN. [id number]     Primary care provider. Adrian Granados MD  (Tel: [phone])    Admit date: 1/31/2020    Discharge date (blank if same as Note Date): Note Date: 2/7/2020     Reason for Hospitalization. Chief Complaint   Patient presents with    Hypoglycemia     pt brought in by [facility] from home where neighbor went to check on her and found her on the floor upstairs with her cane downstairs; FSBS = 47 on scene and given 25 GM of D10         Significant Findings. Principal Problem:    Closed left hip fracture, initial encounter [facility])  Active Problems:    Atrial fibrillation ([facility] [address]    Hypothyroidism    Depression    Parkinson's disease ([facility] [address]  Resolved Problems:    * No resolved hospital problems. *       Problems and results from this hospitalization that need follow up. 1.     Significant test results and incidental findings. 1.     Invasive procedures and treatments. [address] Course. The patient was found down at home , she likely had been down for 20 hours. She had Rhabdo and a left hip fracture. She also had known parkinson's disease. She was admitted and hydrated and went to the OR for a surgical repair of her hip. Post operatively she developed dysphagia with subsequent acute hypoxic respiratory failure due to aspiration PNA. After detailed conversation with her next of kin ,she was made a DNR and passed away. Consults. IP CONSULT TO HOSPITALIST  IP CONSULT TO ORTHOPEDIC SURGERY  IP CONSULT TO SOCIAL WORK  IP CONSULT TO SOCIAL WORK  IP CONSULT TO SPIRITUAL SERVICES    Physical examination on discharge day.    BP (!) 89/51   Pulse 80   Temp 97.4 °F (36.3 °C) (Temporal)   Resp 20   Ht 5' 4\" (1.626 m)   Wt 109 lb (49.4 kg)   LMP

## 2020-02-07 NOTE — PLAN OF CARE
Problem: Falls - Risk of:  Goal: Will remain free from falls  Description  Will remain free from falls  2/6/2020 1934 by Annabelle Garcia RN  Outcome: Ongoing  2/6/2020 1308 by Murphy Pettit RN  Outcome: Ongoing  Goal: Absence of physical injury  Description  Absence of physical injury  2/6/2020 1934 by Annabelle Garcia RN  Outcome: Ongoing  2/6/2020 1308 by Murphy Pettit RN  Outcome: Ongoing     Problem: Risk for Impaired Skin Integrity  Goal: Tissue integrity - skin and mucous membranes  Description  Structural intactness and normal physiological function of skin and  mucous membranes.   2/6/2020 1934 by Annabelle Garcia RN  Outcome: Ongoing  2/6/2020 1308 by Murphy Pettit RN  Outcome: Ongoing     Problem: Pain:  Goal: Pain level will decrease  Description  Pain level will decrease  2/6/2020 1934 by Annabelle Garcia RN  Outcome: Ongoing  2/6/2020 1308 by Murphy Pettit RN  Outcome: Ongoing  Goal: Control of acute pain  Description  Control of acute pain  2/6/2020 1934 by Annabelle Garcia RN  Outcome: Ongoing  2/6/2020 1308 by Murphy Pettit RN  Outcome: Ongoing  Goal: Control of chronic pain  Description  Control of chronic pain  2/6/2020 1934 by Annabelle Garcia RN  Outcome: Ongoing  2/6/2020 1308 by Murphy Pettit RN  Outcome: Ongoing     Problem: Musculor/Skeletal Functional Status  Goal: Highest potential functional level  2/6/2020 1934 by Annabelle Garcia RN  Outcome: Ongoing  2/6/2020 1308 by Murphy Pettit RN  Outcome: Ongoing  Goal: Absence of falls  2/6/2020 1934 by Annabelle Garcia RN  Outcome: Ongoing  2/6/2020 1308 by Murphy Pettit RN  Outcome: Ongoing     Problem: Breathing Pattern - Ineffective:  Goal: Ability to achieve and maintain a regular respiratory rate will improve  Description  Ability to achieve and maintain a regular respiratory rate will improve  2/6/2020 1934 by Annabelle Garcia RN  Outcome: Ongoing  2/6/2020 1308 by Murphy Pettit RN  Outcome: Ongoing     Problem: Skin Integrity:  Goal: Will show no

## 2025-03-30 NOTE — PROGRESS NOTES
Speech Language Pathology  Dysphagia Treatment Note    Name:  Trinidad Bruno  :   1932  Medical Diagnosis:  Closed left hip fracture, initial encounter ([facility] [address] [S72.002A]  Closed left hip fracture, initial encounter ([facility] [address] [S72.002A]  Treatment Diagnosis: Oropharyngeal Dysphagia  Pain level:  Pt denied    Subjective:   Per treatment team, pt improved mentation from yesterday. Improving speech intelligibility although remains dysarthric. Current Diet Level:   Strict NPO status     Tolerance of Current Diet Level:   Pt requesting water and liquids from nurse. RN reported single ice chip provided for moisture this AM, resulted in congested coughing. Assessment of Texture Tolerance:  Pt sitting up in bed, remains on 2L O2 via nasal cannula. Thick secretions on palate. Pt had difficulty maintaining neutral chin positioning. Accepted pureed solids and mildly (nectar) thick liquids. Pureed solids revealed moderate lingual pumping to propel bolus, 3-4 swallows to clear pharyngeal vestibule. Wet vocal quality and delayed coughing was noted. Mildly thick liquids via tsp revealed overt signs of premature bolus loss and congested coughing. Eventual expulsion of thick yellow mucous. All swallows initiated were severely weak. Pt demonstrating very poor airway protection at this time with high concern for aspiration, swallow mechanism is not functional to tolerate po at this time. Diet and Treatment Recommendations:  Recommend Strict NPO status    Ongoing SLP/dysphagia assessment to determine ability to tolerate oral diet   Consider alternative nutrition   MBS will eventually be beneficial to assess the pharyngeal phase and determine appropriate therapeutic methods, however unable to demonstrate tolerance of any po intake at bedside. STG: (NEW Goals made due to downgrade to NPO status)   1) Pt will tolerate ongoing swallow assessment to determine safest and least restrictive diet.     2020 ONGOING  4-calculated by average/Not able to assess (calculate score using [facility] averaging method)

## (undated) DEVICE — CONTROL SYRINGE LUER-LOCK TIP: Brand: MONOJECT

## (undated) DEVICE — SHEET,DRAPE,53X77,STERILE: Brand: MEDLINE

## (undated) DEVICE — Z DISCONTINUED USE 2275686 GLOVE SURG SZ 8 L12IN FNGR THK13MIL WHT ISOLEX POLYISOPRENE

## (undated) DEVICE — 6619 2 PTNT ISO SYS INCISE AREA&LT;(&GT;&&LT;)&GT;P: Brand: STERI-DRAPE™ IOBAN™ 2

## (undated) DEVICE — BANDAGE COBAN 4 IN COMPR W4INXL5YD FOAM COHESIVE QUIK STK SELF ADH SFT

## (undated) DEVICE — MERCY FAIRFIELD TURNOVER KIT: Brand: MEDLINE INDUSTRIES, INC.

## (undated) DEVICE — CHLORAPREP 26ML ORANGE

## (undated) DEVICE — STANDARD HYPODERMIC NEEDLE,POLYPROPYLENE HUB: Brand: MONOJECT

## (undated) DEVICE — DRESSING,GAUZE,XEROFORM,CURAD,1"X8",ST: Brand: CURAD

## (undated) DEVICE — STERILE LATEX POWDER-FREE SURGICAL GLOVESWITH NITRILE COATING: Brand: PROTEXIS

## (undated) DEVICE — ELECTRODE PT RET AD L9FT HI MOIST COND ADH HYDRGEL CORDED

## (undated) DEVICE — LIMB HOLDER, WRIST/ANKLE: Brand: DEROYAL

## (undated) DEVICE — K-WIRE

## (undated) DEVICE — SUTURE VCRL SZ 0 L36IN ABSRB UD L36MM CT-1 1/2 CIR J946H

## (undated) DEVICE — 3M™ TEGADERM™ TRANSPARENT FILM DRESSING FRAME STYLE, 1628, 6 IN X 8 IN (15 CM X 20 CM), 10/CT 8CT/CASE: Brand: 3M™ TEGADERM™

## (undated) DEVICE — STAPLER SKIN H3.9MM WIRE DIA0.58MM CRWN 6.9MM 35 STPL ROT

## (undated) DEVICE — BLADE CLIPPER GEN PURP NS

## (undated) DEVICE — GUIDE WIRE, BALL-TIPPED, STERILE

## (undated) DEVICE — CLOSED TUBE CLIP: Brand: GAMMA

## (undated) DEVICE — GAUZE,SPONGE,4"X4",8PLY,STRL,LF,10/TRAY: Brand: MEDLINE

## (undated) DEVICE — MAJOR SET UP PK

## (undated) DEVICE — DRILL, AO SMALL: Brand: GAMMA

## (undated) DEVICE — MAT FLR ABS DISP

## (undated) DEVICE — COVER LT HNDL BLU PLAS

## (undated) DEVICE — SUTURE VCRL SZ 2-0 L36IN ABSRB UD L36MM CT-1 1/2 CIR J945H

## (undated) DEVICE — PAD,ABDOMINAL,8"X10",ST,LF: Brand: MEDLINE